# Patient Record
Sex: FEMALE | Race: WHITE | Employment: FULL TIME | URBAN - METROPOLITAN AREA
[De-identification: names, ages, dates, MRNs, and addresses within clinical notes are randomized per-mention and may not be internally consistent; named-entity substitution may affect disease eponyms.]

---

## 2017-01-17 ENCOUNTER — OFFICE VISIT (OUTPATIENT)
Dept: DERMATOLOGY | Age: 52
End: 2017-01-17

## 2017-01-17 DIAGNOSIS — D48.9 NEOPLASM OF UNCERTAIN BEHAVIOR: ICD-10-CM

## 2017-01-17 DIAGNOSIS — L91.8 INFLAMED SKIN TAG: ICD-10-CM

## 2017-01-17 DIAGNOSIS — D22.9 MULTIPLE NEVI: Primary | ICD-10-CM

## 2017-01-17 PROCEDURE — 99202 OFFICE O/P NEW SF 15 MIN: CPT | Performed by: DERMATOLOGY

## 2017-01-17 PROCEDURE — 11200 RMVL SKIN TAGS UP TO&INC 15: CPT | Performed by: DERMATOLOGY

## 2017-01-17 PROCEDURE — 11100 PR BIOPSY OF SKIN LESION: CPT | Performed by: DERMATOLOGY

## 2017-01-23 ENCOUNTER — TELEPHONE (OUTPATIENT)
Dept: DERMATOLOGY | Age: 52
End: 2017-01-23

## 2017-03-06 ENCOUNTER — HOSPITAL ENCOUNTER (OUTPATIENT)
Dept: CT IMAGING | Age: 52
Discharge: HOME | End: 2017-03-06
Attending: PHYSICIAN ASSISTANT | Admitting: PHYSICIAN ASSISTANT

## 2017-03-06 DIAGNOSIS — R10.84 GENERALIZED ABDOMINAL PAIN: ICD-10-CM

## 2017-03-07 ENCOUNTER — HOSPITAL ENCOUNTER (OUTPATIENT)
Dept: ULTRASOUND IMAGING | Age: 52
Discharge: HOME | End: 2017-03-07
Attending: INTERNAL MEDICINE | Admitting: INTERNAL MEDICINE

## 2017-03-07 DIAGNOSIS — R10.2 PELVIC AND PERINEAL PAIN: ICD-10-CM

## 2017-08-23 ENCOUNTER — OFFICE VISIT (OUTPATIENT)
Dept: ORTHOPEDIC SURGERY | Age: 52
End: 2017-08-23

## 2017-08-23 VITALS
HEART RATE: 79 BPM | WEIGHT: 140 LBS | HEIGHT: 65 IN | DIASTOLIC BLOOD PRESSURE: 74 MMHG | BODY MASS INDEX: 23.32 KG/M2 | SYSTOLIC BLOOD PRESSURE: 107 MMHG

## 2017-08-23 DIAGNOSIS — M79.642 LEFT HAND PAIN: Primary | ICD-10-CM

## 2017-08-23 PROCEDURE — 99213 OFFICE O/P EST LOW 20 MIN: CPT | Performed by: ORTHOPAEDIC SURGERY

## 2017-08-23 PROCEDURE — 73130 X-RAY EXAM OF HAND: CPT | Performed by: ORTHOPAEDIC SURGERY

## 2018-01-17 ENCOUNTER — HOSPITAL ENCOUNTER (OUTPATIENT)
Dept: CT IMAGING | Age: 53
Discharge: HOME | End: 2018-01-17
Attending: INTERNAL MEDICINE | Admitting: INTERNAL MEDICINE

## 2018-01-17 DIAGNOSIS — R10.32 LEFT LOWER QUADRANT PAIN: ICD-10-CM

## 2018-01-17 PROBLEM — A41.9 SEPSIS (HCC): Status: ACTIVE | Noted: 2018-01-17

## 2018-01-17 PROBLEM — K57.20 DIVERTICULITIS OF LARGE INTESTINE WITH PERFORATION WITHOUT ABSCESS OR BLEEDING: Status: ACTIVE | Noted: 2018-01-17

## 2018-03-07 ENCOUNTER — HOSPITAL ENCOUNTER (OUTPATIENT)
Dept: ENDOSCOPY | Age: 53
Discharge: HOME | End: 2018-03-07
Attending: INTERNAL MEDICINE | Admitting: INTERNAL MEDICINE

## 2018-03-07 VITALS
HEART RATE: 67 BPM | HEIGHT: 64 IN | WEIGHT: 134.8 LBS | RESPIRATION RATE: 18 BRPM | OXYGEN SATURATION: 100 % | TEMPERATURE: 97.8 F | SYSTOLIC BLOOD PRESSURE: 106 MMHG | BODY MASS INDEX: 23.01 KG/M2 | DIASTOLIC BLOOD PRESSURE: 65 MMHG

## 2018-03-07 LAB — PREGNANCY, URINE: NEGATIVE

## 2018-03-07 RX ORDER — SODIUM CHLORIDE 0.9 % (FLUSH) 0.9 %
10 SYRINGE (ML) INJECTION EVERY 12 HOURS SCHEDULED
Status: DISCONTINUED | OUTPATIENT
Start: 2018-03-07 | End: 2018-03-08 | Stop reason: HOSPADM

## 2018-03-07 RX ORDER — SODIUM CHLORIDE 9 MG/ML
INJECTION, SOLUTION INTRAVENOUS CONTINUOUS
Status: DISCONTINUED | OUTPATIENT
Start: 2018-03-07 | End: 2018-03-08 | Stop reason: HOSPADM

## 2018-03-07 RX ORDER — SODIUM CHLORIDE 0.9 % (FLUSH) 0.9 %
10 SYRINGE (ML) INJECTION PRN
Status: DISCONTINUED | OUTPATIENT
Start: 2018-03-07 | End: 2018-03-08 | Stop reason: HOSPADM

## 2018-03-07 RX ADMIN — SODIUM CHLORIDE: 9 INJECTION, SOLUTION INTRAVENOUS at 10:13

## 2018-03-07 ASSESSMENT — PAIN SCALES - WONG BAKER: WONGBAKER_NUMERICALRESPONSE: 0

## 2018-03-07 ASSESSMENT — PAIN SCALES - GENERAL
PAINLEVEL_OUTOF10: 0
PAINLEVEL_OUTOF10: 0

## 2018-03-07 ASSESSMENT — PAIN - FUNCTIONAL ASSESSMENT: PAIN_FUNCTIONAL_ASSESSMENT: 0-10

## 2018-03-07 NOTE — OP NOTE
Colonoscopy Procedure Note      Patient: Trupti Tariq  : 1965  Acct#:     Procedure: Colonoscopy with polypectomy (cold biopsy)    Date:  3/7/2018    Surgeon:  Adelaida Resendez DO    Referring Physician:  Val Espinosa MD    Previous Colonoscopy: YES  Date: 2012  Greater than 3 years: YES    Preoperative Diagnosis:  History of sigmoid diverticulitis    Postoperative Diagnosis:  1) Moderate left sided and mild right sided diverticulosis was noted. No signs of left sided colitis or suggestion of active diverticulitis was noted  2) A 3 mm polyp in the sigmoid colon was removed completely with biopsy polypectomy. 3) Small internal hemorrhoids were noted. Consent:  The patient or their legal guardian has signed a consent, and is aware of the potential risks, benefits, alternatives, and potential complications of this procedure. These include, but are not limited to hemorrhage, bleeding, post procedural pain, perforation, phlebitis, aspiration, hypotension, hypoxia, cardiovascular events such as arryhthmia, and possibly death. Additionally, the possibility of missed colonic polyps and interval colon cancer was discussed in the consent. Anesthesia:  The patient was administered TIVA per anesthesiology team.  Please see their operative records for full details of medications administered. Procedure: An informed consent was obtained from the patient after explanation of indications, benefits, possible risks and complications of the procedure. The patient was then taken to the endoscopy suite, placed in the left lateral decubitus position, and the above IV anesthesia was administered. A digital rectal examination was performed and revealed negative without mass, lesions or tenderness, internal hemorrhoids noted. The Olympus video colonoscope was placed in the patient's rectum under digital direction and advanced to the cecum.  The cecum was identified by characteristic anatomy and ballottment. The prep was good. The ileocecal valve was identified. The terminal ileum was normal.    The scope was then withdrawn back through the cecum, ascending, transverse, descending, sigmoid colon, and rectum. Careful circumferential examination of the mucosa in these areas demonstrated:    1) Moderate left sided and mild right sided diverticulosis was noted. No signs of left sided colitis or suggestion of active diverticulitis was noted  2) A 3 mm polyp in the sigmoid colon was removed completely with biopsy polypectomy. The scope was then withdrawn into the rectum and retroflexed. The retroflexed view of the anal verge and rectum demonstrates small internal hemorrhoids. The scope was straightened, the colon was decompressed and the scope was withdrawn from the patient. The patient tolerated the procedure well and was taken to the PACU in good condition. Estimated blood loss: <5ml    Impression:  See post-procedure diagnoses. Recommendations:  Await pathology. Continue supportive measures    The patient had colon polyp(s) successfully removed today. [place] is a small risk for these sites to bleed for up to several weeks out from the procedure. The patient is instructed to call if there is any significant amounts of  rectal bleeding, abdominal pain, dizziness, or other complaints thought to be related to the procedure. The patient is recommended to have a repeat colonoscopy in 5 or 10 years. This will be determined after the biopsies of the colon polyps are reviewed.       Evelina Damon, [id number] [address] and [facility]  3/7/2018  [phone]

## 2018-03-07 NOTE — ANESTHESIA POST-OP
[facility] Department of Anesthesiology  Post-Anesthesia Note       Name:  Luis F Estrada                                  Age:  47 y.o. MRN:  [id number]     Last Vitals & Oxygen Saturation: /65   Pulse 67   Temp 97.8 °F (36.6 °C) (Tympanic)   Resp 18   Ht 5' 4\" (1.626 m)   Wt 134 lb 12.8 oz (61.1 kg)   LMP 03/07/2017   SpO2 100%   BMI 23.14 kg/m²   Patient Vitals for the past 4 hrs:   BP Temp Temp src Pulse Resp SpO2 Height Weight   03/07/18 1140 106/65 - - 67 18 100 % - -   03/07/18 1130 100/64 - - 91 18 98 % - -   03/07/18 1120 98/64 97.8 °F (36.6 °C) Tympanic 78 16 100 % - -   03/07/18 1005 114/60 98.2 °F (36.8 °C) Tympanic 81 20 100 % 5' 4\" (1.626 m) 134 lb 12.8 oz (61.1 kg)       Level of consciousness:  Awake, alert    Respiratory: Respirations easy, no distress. Stable. Cardiovascular: Hemodynamically stable. Hydration: Adequate. PONV: Adequately managed. Post-op pain: Adequately controlled. Post-op assessment: Tolerated anesthetic well without complication. Complications:  None.     Pooja Corona MD  March 7, 2018   12:19 PM

## 2018-03-07 NOTE — ANESTHESIA PRE-OP
Intravenous PRN Saadia Batres MD         Vital Signs (Current) There were no vitals filed for this visit. Vital Signs Statistics (for past 48 hrs)     No Data Recorded    BP Readings from Last 3 Encounters:   01/19/18 (!) 110/58   08/23/17 107/74   05/12/16 107/73     BMI  There is no height or weight on file to calculate BMI. Estimated body mass index is 23.17 kg/m² as calculated from the following:    Height as of 2/28/18: 5' 4\" (1.626 m). Weight as of 2/28/18: 135 lb (61.2 kg). CBC   Lab Results   Component Value Date    WBC 6.7 01/18/2018    RBC 3.38 01/18/2018    HGB 10.8 01/18/2018    HCT 31.4 01/18/2018    MCV 92.8 01/18/2018    RDW 13.3 01/18/2018     01/18/2018     CMP    Lab Results   Component Value Date     01/18/2018    K 3.5 01/18/2018     01/18/2018    CO2 23 01/18/2018    BUN 7 01/18/2018    CREATININE 0.6 01/18/2018    GFRAA >60 01/18/2018    GFRAA >60 01/18/2011    AGRATIO 2.0 01/18/2011    LABGLOM >60 01/18/2018    GLUCOSE 97 01/18/2018    PROT 6.9 01/18/2011    CALCIUM 8.3 01/18/2018    BILITOT 0.70 01/18/2011    ALKPHOS 39 01/18/2011    AST 18 01/18/2011    ALT 13 01/18/2011     BMP    Lab Results   Component Value Date     01/18/2018    K 3.5 01/18/2018     01/18/2018    CO2 23 01/18/2018    BUN 7 01/18/2018    CREATININE 0.6 01/18/2018    CALCIUM 8.3 01/18/2018    GFRAA >60 01/18/2018    GFRAA >60 01/18/2011    LABGLOM >60 01/18/2018    GLUCOSE 97 01/18/2018     POCGlucose  No results for input(s): GLUCOSE in the last 72 hours.    Coags  No results found for: PROTIME, INR, APTT  HCG (If Applicable) No results found for: PREGTESTUR, PREGSERUM, HCG, HCGQUANT   ABGs No results found for: PHART, PO2ART, WSX9AFE, FTM3ZJN, BEART, M2LOJWYL   Type & Screen (If Applicable)  No results found for: LABABO, LABRH                         BMI: Wt Readings from Last 3 Encounters:       NPO Status:  >8h                          Anesthesia Evaluation  Patient summary reviewed no history of anesthetic complications:   Airway: Mallampati: II  TM distance: >3 FB   Neck ROM: full  Mouth opening: > = 3 FB Dental: normal exam         Pulmonary:normal exam  breath sounds clear to auscultation      (-) COPD, recent URI and sleep apnea                           Cardiovascular:  Exercise tolerance: good (>4 METS),       (-) hypertension, past MI, CAD and  angina      Rhythm: regular  Rate: normal           Beta Blocker:  Not on Beta Blocker         Neuro/Psych:      (-) seizures, TIA and CVA           GI/Hepatic/Renal:   (+) bowel prep,      (-) GERD, hepatitis, liver disease and no morbid obesity      ROS comment: diverticulitis. Endo/Other:        (-) diabetes mellitus, hypothyroidism               Abdominal:         (-) obese     Vascular:     - DVT and PE. Anesthesia Plan      MAC and TIVA     ASA 2       Induction: intravenous. Anesthetic plan and risks discussed with patient. Plan discussed with CRNA. This pre-anesthesia assessment may be used as a history and physical.    DOS STAFF ADDENDUM:    Pt seen and examined, chart reviewed (including anesthesia, drug and allergy history). No interval changes to history and physical examination. Anesthetic plan, risks, benefits, alternatives, and personnel involved discussed with patient. Patient verbalized an understanding and agrees to proceed.       Ernst Joe MD  March 7, 2018  10:05 AM

## 2018-03-07 NOTE — H&P
Pre-operative History and Physical    Patient: Russel Stovall  : 1965  Acct#:     Intended Procedure:  Colonoscopy    HISTORY OF PRESENT ILLNESS:  The patient is a 47 y.o. female  who presents for/due to history of sigmoid diverticulitis       Past Medical History:    No past medical history on file. Past Surgical History:        Procedure Laterality Date    BREAST SURGERY      ENDOMETRIAL ABLATION      TONSILLECTOMY      WRIST SURGERY       Medications Prior to Admission:   Current Outpatient Prescriptions on File Prior to Encounter   Medication Sig Dispense Refill    NONFORMULARY        No current facility-administered medications on file prior to encounter. Allergies:  Opium    Social History:   TOBACCO:   reports that she has quit smoking. She has never used smokeless tobacco.  ETOH:   reports that she drinks alcohol. DRUGS:   reports that she does not use drugs. PHYSICAL EXAM:      Vital Signs: There were no vitals taken for this visit. Airway: No stridor or wheezing noted. Good air movement  Pulmonary: without wheezes. Clear to auscultation  Cardiac:regular rate and rhythm without loud murmurs  Abdomen:soft, nontender,  Bowel sounds present    Pre-Procedure Assessment / Plan:  1) History of sigmoid diverticulitis. ASA Grade:  ASA 2 - Patient with mild systemic disease with no functional limitations  Mallampati Classification:  Class II    Level of Sedation Plan:Deep sedation    Post Procedure plan: Return to same level of care    I assessed the patient and find that the patient is in satisfactory condition to proceed with the planned procedure and sedation plan. I have explained the risk, benefits, and alternatives to the procedure; the patient understands and agrees to proceed.        Cortes Anderson, DO  3/7/2018

## 2018-09-12 LAB
A/G RATIO: 2.2 (ref 1.1–2.2)
ALBUMIN SERPL-MCNC: 4.6 G/DL (ref 3.4–5)
ALP BLD-CCNC: 47 U/L (ref 40–129)
ALT SERPL-CCNC: 8 U/L (ref 10–40)
ANION GAP SERPL CALCULATED.3IONS-SCNC: 14 MMOL/L (ref 3–16)
AST SERPL-CCNC: 12 U/L (ref 15–37)
BASOPHILS ABSOLUTE: 0.1 K/UL (ref 0–0.2)
BASOPHILS RELATIVE PERCENT: 0.5 %
BILIRUB SERPL-MCNC: 0.4 MG/DL (ref 0–1)
BUN BLDV-MCNC: 13 MG/DL (ref 7–20)
C-REACTIVE PROTEIN: 1 MG/L (ref 0–5.1)
CALCIUM SERPL-MCNC: 9.4 MG/DL (ref 8.3–10.6)
CHLORIDE BLD-SCNC: 100 MMOL/L (ref 99–110)
CO2: 27 MMOL/L (ref 21–32)
CREAT SERPL-MCNC: 0.7 MG/DL (ref 0.6–1.1)
EOSINOPHILS ABSOLUTE: 0.1 K/UL (ref 0–0.6)
EOSINOPHILS RELATIVE PERCENT: 1.1 %
GFR AFRICAN AMERICAN: >60
GFR NON-AFRICAN AMERICAN: >60
GLOBULIN: 2.1 G/DL
GLUCOSE BLD-MCNC: 76 MG/DL (ref 70–99)
HCT VFR BLD CALC: 38.3 % (ref 36–48)
HEMOGLOBIN: 12.5 G/DL (ref 12–16)
LYMPHOCYTES ABSOLUTE: 2.4 K/UL (ref 1–5.1)
LYMPHOCYTES RELATIVE PERCENT: 23.7 %
MCH RBC QN AUTO: 30.7 PG (ref 26–34)
MCHC RBC AUTO-ENTMCNC: 32.8 G/DL (ref 31–36)
MCV RBC AUTO: 93.8 FL (ref 80–100)
MONOCYTES ABSOLUTE: 1 K/UL (ref 0–1.3)
MONOCYTES RELATIVE PERCENT: 9.6 %
NEUTROPHILS ABSOLUTE: 6.6 K/UL (ref 1.7–7.7)
NEUTROPHILS RELATIVE PERCENT: 65.1 %
PDW BLD-RTO: 13.9 % (ref 12.4–15.4)
PLATELET # BLD: 285 K/UL (ref 135–450)
PMV BLD AUTO: 8.8 FL (ref 5–10.5)
POTASSIUM SERPL-SCNC: 3.9 MMOL/L (ref 3.5–5.1)
RBC # BLD: 4.08 M/UL (ref 4–5.2)
SODIUM BLD-SCNC: 141 MMOL/L (ref 136–145)
TOTAL PROTEIN: 6.7 G/DL (ref 6.4–8.2)
WBC # BLD: 10.2 K/UL (ref 4–11)

## 2019-03-11 ENCOUNTER — EMPLOYEE WELLNESS (OUTPATIENT)
Dept: OTHER | Age: 54
End: 2019-03-11

## 2019-03-11 LAB
CHOLESTEROL, TOTAL: 237 MG/DL (ref 0–199)
GLUCOSE BLD-MCNC: 77 MG/DL (ref 70–99)
HDLC SERPL-MCNC: 85 MG/DL (ref 40–60)
LDL CHOLESTEROL CALCULATED: 139 MG/DL
TRIGL SERPL-MCNC: 67 MG/DL (ref 0–150)

## 2019-03-20 VITALS — WEIGHT: 132 LBS | BODY MASS INDEX: 22.66 KG/M2

## 2019-11-08 ENCOUNTER — OFFICE VISIT (OUTPATIENT)
Dept: FAMILY MEDICINE CLINIC | Age: 54
End: 2019-11-08
Payer: COMMERCIAL

## 2019-11-08 VITALS
HEART RATE: 84 BPM | DIASTOLIC BLOOD PRESSURE: 74 MMHG | SYSTOLIC BLOOD PRESSURE: 112 MMHG | BODY MASS INDEX: 21.85 KG/M2 | WEIGHT: 128 LBS | HEIGHT: 64 IN

## 2019-11-08 DIAGNOSIS — F34.1 DYSTHYMIA: ICD-10-CM

## 2019-11-08 DIAGNOSIS — K58.1 IRRITABLE BOWEL SYNDROME WITH CONSTIPATION: ICD-10-CM

## 2019-11-08 DIAGNOSIS — Z23 NEED FOR TDAP VACCINATION: ICD-10-CM

## 2019-11-08 DIAGNOSIS — E78.00 PURE HYPERCHOLESTEROLEMIA: ICD-10-CM

## 2019-11-08 DIAGNOSIS — Z00.00 WELL ADULT EXAM: Primary | ICD-10-CM

## 2019-11-08 PROBLEM — A41.9 SEPSIS (HCC): Status: RESOLVED | Noted: 2018-01-17 | Resolved: 2019-11-08

## 2019-11-08 PROBLEM — K57.92 DIVERTICULITIS: Status: ACTIVE | Noted: 2018-01-01

## 2019-11-08 PROCEDURE — 90715 TDAP VACCINE 7 YRS/> IM: CPT | Performed by: FAMILY MEDICINE

## 2019-11-08 PROCEDURE — 99386 PREV VISIT NEW AGE 40-64: CPT | Performed by: FAMILY MEDICINE

## 2019-11-08 PROCEDURE — 90471 IMMUNIZATION ADMIN: CPT | Performed by: FAMILY MEDICINE

## 2019-11-08 RX ORDER — BUPROPION HYDROCHLORIDE 300 MG/1
300 TABLET ORAL EVERY MORNING
COMMUNITY
End: 2019-11-08 | Stop reason: SDUPTHER

## 2019-11-08 RX ORDER — LUBIPROSTONE 8 UG/1
8 CAPSULE, GELATIN COATED ORAL 2 TIMES DAILY WITH MEALS
Qty: 180 CAPSULE | Refills: 1 | Status: SHIPPED | OUTPATIENT
Start: 2019-11-08 | End: 2020-05-01

## 2019-11-08 RX ORDER — RANITIDINE 150 MG/1
150 TABLET ORAL 2 TIMES DAILY
Qty: 180 TABLET | Refills: 1 | Status: SHIPPED | OUTPATIENT
Start: 2019-11-08 | End: 2022-10-20

## 2019-11-08 RX ORDER — BUPROPION HYDROCHLORIDE 300 MG/1
300 TABLET ORAL EVERY MORNING
Qty: 90 TABLET | Refills: 1 | Status: SHIPPED | OUTPATIENT
Start: 2019-11-08 | End: 2020-05-01

## 2019-11-08 RX ORDER — LUBIPROSTONE 8 UG/1
8 CAPSULE, GELATIN COATED ORAL 2 TIMES DAILY WITH MEALS
COMMUNITY
End: 2019-11-08 | Stop reason: SDUPTHER

## 2019-11-08 ASSESSMENT — PATIENT HEALTH QUESTIONNAIRE - PHQ9
2. FEELING DOWN, DEPRESSED OR HOPELESS: 0
SUM OF ALL RESPONSES TO PHQ QUESTIONS 1-9: 0
SUM OF ALL RESPONSES TO PHQ9 QUESTIONS 1 & 2: 0
SUM OF ALL RESPONSES TO PHQ QUESTIONS 1-9: 0
1. LITTLE INTEREST OR PLEASURE IN DOING THINGS: 0

## 2019-12-18 ENCOUNTER — OFFICE VISIT (OUTPATIENT)
Dept: ORTHOPEDIC SURGERY | Age: 54
End: 2019-12-18
Payer: COMMERCIAL

## 2019-12-18 VITALS — WEIGHT: 128.09 LBS | BODY MASS INDEX: 21.87 KG/M2 | HEIGHT: 64 IN | RESPIRATION RATE: 16 BRPM

## 2019-12-18 DIAGNOSIS — M25.532 LEFT WRIST PAIN: Primary | ICD-10-CM

## 2019-12-18 DIAGNOSIS — M18.12 PRIMARY OSTEOARTHRITIS OF FIRST CARPOMETACARPAL JOINT OF LEFT HAND: ICD-10-CM

## 2019-12-18 PROCEDURE — 20600 DRAIN/INJ JOINT/BURSA W/O US: CPT | Performed by: ORTHOPAEDIC SURGERY

## 2019-12-18 PROCEDURE — L3924 HFO WITHOUT JOINTS PRE OTS: HCPCS | Performed by: ORTHOPAEDIC SURGERY

## 2019-12-18 PROCEDURE — 99203 OFFICE O/P NEW LOW 30 MIN: CPT | Performed by: ORTHOPAEDIC SURGERY

## 2019-12-18 RX ORDER — ALPRAZOLAM 0.25 MG/1
TABLET ORAL
COMMUNITY
Start: 2019-05-09

## 2019-12-18 RX ORDER — BETAMETHASONE SODIUM PHOSPHATE AND BETAMETHASONE ACETATE 3; 3 MG/ML; MG/ML
6 INJECTION, SUSPENSION INTRA-ARTICULAR; INTRALESIONAL; INTRAMUSCULAR; SOFT TISSUE ONCE
Status: COMPLETED | OUTPATIENT
Start: 2019-12-18 | End: 2019-12-18

## 2019-12-18 RX ORDER — MOXIFLOXACIN HYDROCHLORIDE 400 MG/1
400 TABLET ORAL
COMMUNITY
Start: 2019-08-17 | End: 2021-10-12

## 2019-12-18 RX ADMIN — BETAMETHASONE SODIUM PHOSPHATE AND BETAMETHASONE ACETATE 6 MG: 3; 3 INJECTION, SUSPENSION INTRA-ARTICULAR; INTRALESIONAL; INTRAMUSCULAR; SOFT TISSUE at 08:47

## 2020-03-02 ENCOUNTER — EMPLOYEE WELLNESS (OUTPATIENT)
Dept: OTHER | Age: 55
End: 2020-03-02

## 2020-03-02 LAB
CHOLESTEROL, TOTAL: 189 MG/DL (ref 0–199)
GLUCOSE BLD-MCNC: 85 MG/DL (ref 70–99)
HDLC SERPL-MCNC: 85 MG/DL (ref 40–60)
LDL CHOLESTEROL CALCULATED: 92 MG/DL
TRIGL SERPL-MCNC: 60 MG/DL (ref 0–150)

## 2020-03-05 LAB
3-OH-COTININE: <2 NG/ML
COTININE: <2 NG/ML
NICOTINE: <2 NG/ML

## 2020-05-01 RX ORDER — LUBIPROSTONE 8 UG/1
CAPSULE, GELATIN COATED ORAL
Qty: 60 CAPSULE | Refills: 0 | Status: SHIPPED | OUTPATIENT
Start: 2020-05-01 | End: 2020-05-20 | Stop reason: SDUPTHER

## 2020-05-01 RX ORDER — BUPROPION HYDROCHLORIDE 300 MG/1
TABLET ORAL
Qty: 30 TABLET | Refills: 0 | Status: SHIPPED | OUTPATIENT
Start: 2020-05-01 | End: 2020-05-20 | Stop reason: SDUPTHER

## 2020-05-19 NOTE — PROGRESS NOTES
MD   ranitidine (ZANTAC) 150 MG tablet Take 1 tablet by mouth 2 times daily  Sheba Martinez MD       Social History     Tobacco Use    Smoking status: Former Smoker     Packs/day: 1.00     Years: 12.00     Pack years: 12.00     Last attempt to quit: 2001     Years since quittin.3    Smokeless tobacco: Never Used   Substance Use Topics    Alcohol use: Yes     Alcohol/week: 3.0 standard drinks     Types: 3 Glasses of wine per week    Drug use: No            PHYSICAL EXAMINATION:  [ INSTRUCTIONS:  \"[x]\" Indicates a positive item  \"[]\" Indicates a negative item  -- DELETE ALL ITEMS NOT EXAMINED]  Vital Signs: (As obtained by patient/caregiver or practitioner observation)    Blood pressure-  Heart rate-    Respiratory rate-    Temperature-  Pulse oximetry-     Constitutional: [x] Appears well-developed and well-nourished [x] No apparent distress      [] Abnormal-   Mental status  [x] Alert and awake  [x] Oriented to person/place/time [x]Able to follow commands      Eyes:  EOM    [x]  Normal  [] Abnormal-  Sclera  [x]  Normal  [] Abnormal -         Discharge [x]  None visible  [] Abnormal -    HENT:   [x] Normocephalic, atraumatic. [] Abnormal   [x] Mouth/Throat: Mucous membranes are moist.     External Ears [x] Normal  [] Abnormal-     Neck: [x] No visualized mass     Pulmonary/Chest: [x] Respiratory effort normal.  [x] No visualized signs of difficulty breathing or respiratory distress        [] Abnormal-      Neurological:        [x] No Facial Asymmetry (Cranial nerve 7 motor function) (limited exam to video visit)          [x] No gaze palsy        [] Abnormal-         Skin:        [x] No significant exanthematous lesions or discoloration noted on facial skin         [] Abnormal-            Psychiatric:       [x] Normal Affect [x] No Hallucinations        [] Abnormal-     Other pertinent observable physical exam findings-     ASSESSMENT/PLAN:  1. Dysthymia  Well controlled.    - buPROPion (WELLBUTRIN XL) 300 MG extended release tablet; TAKE ONE TABLET BY MOUTH EVERY MORNING  Dispense: 90 tablet; Refill: 1    2. Irritable bowel syndrome with constipation  Controlled. - lubiprostone (AMITIZA) 8 MCG CAPS capsule; TAKE 1 CAPSULE BY MOUTH 2 TIMES A DAY WITH MEALS  Dispense: 180 capsule; Refill: 1    3. Pure hypercholesterolemia  Patient had be well labs in March. Cholesterol is much improved from previous check. 10 year risk score remains low. 4. Gastroesophageal reflux disease without esophagitis  Controlled on Zantac as needed. Patient is aware the medication has been recalled. She states she uses is infrequently. No follow-ups on file. HM: Patient recently switched to Dr. Jeramy Molina for her primary care. Her previous PCP did her pap smears. Patient gets paps every 3 years and believes she is due next April. Amanuel  is a 49 y.o. female being evaluated by a Virtual Visit (video visit) encounter to address concerns as mentioned above. A caregiver was present when appropriate. Due to this being a TeleHealth encounter (During NOWSO-37 public health emergency), evaluation of the following organ systems was limited: Vitals/Constitutional/EENT/Resp/CV/GI//MS/Neuro/Skin/Heme-Lymph-Imm. Pursuant to the emergency declaration under the [address] [place] [address], [address] [place] [address] authority and the [employer] and [employer] Act, this Virtual Visit was conducted with patient's (and/or legal guardian's) consent, to reduce the patient's risk of exposure to COVID-19 and provide necessary medical care. The patient (and/or legal guardian) has also been advised to contact this office for worsening conditions or problems, and seek emergency medical treatment and/or call 911 if deemed necessary.      Patient identification was verified at the start of the visit: Yes    Total time spent on this encounter: Not billed by time    Services

## 2020-05-20 ENCOUNTER — VIRTUAL VISIT (OUTPATIENT)
Dept: FAMILY MEDICINE CLINIC | Age: 55
End: 2020-05-20
Payer: COMMERCIAL

## 2020-05-20 PROCEDURE — 99213 OFFICE O/P EST LOW 20 MIN: CPT | Performed by: NURSE PRACTITIONER

## 2020-05-20 RX ORDER — LUBIPROSTONE 8 UG/1
CAPSULE, GELATIN COATED ORAL
Qty: 180 CAPSULE | Refills: 1 | Status: SHIPPED | OUTPATIENT
Start: 2020-05-20 | End: 2021-01-20 | Stop reason: SDUPTHER

## 2020-05-20 RX ORDER — BUPROPION HYDROCHLORIDE 300 MG/1
TABLET ORAL
Qty: 90 TABLET | Refills: 1 | Status: SHIPPED | OUTPATIENT
Start: 2020-05-20 | End: 2020-09-21

## 2020-07-02 ENCOUNTER — OFFICE VISIT (OUTPATIENT)
Dept: PRIMARY CARE CLINIC | Age: 55
End: 2020-07-02
Payer: COMMERCIAL

## 2020-07-02 LAB — SARS-COV-2, PCR: NOT DETECTED

## 2020-07-02 PROCEDURE — 99211 OFF/OP EST MAY X REQ PHY/QHP: CPT | Performed by: NURSE PRACTITIONER

## 2020-07-02 NOTE — PROGRESS NOTES
Patient presented to [facility] drive up clinic for preop testing. Patient was swabbed and given information advising them to remain isolated until procedure date.

## 2020-08-17 VITALS — BODY MASS INDEX: 22.47 KG/M2 | WEIGHT: 131 LBS

## 2020-08-26 ENCOUNTER — EMPLOYEE WELLNESS (OUTPATIENT)
Dept: OTHER | Age: 55
End: 2020-08-26

## 2020-08-26 LAB
CHOLESTEROL, TOTAL: 213 MG/DL (ref 0–199)
GLUCOSE BLD-MCNC: 87 MG/DL (ref 70–99)
HDLC SERPL-MCNC: 92 MG/DL (ref 40–60)
LDL CHOLESTEROL CALCULATED: 109 MG/DL
TRIGL SERPL-MCNC: 58 MG/DL (ref 0–150)

## 2020-10-12 ENCOUNTER — OFFICE VISIT (OUTPATIENT)
Dept: FAMILY MEDICINE CLINIC | Age: 55
End: 2020-10-12
Payer: COMMERCIAL

## 2020-10-12 ENCOUNTER — TELEPHONE (OUTPATIENT)
Dept: FAMILY MEDICINE CLINIC | Age: 55
End: 2020-10-12

## 2020-10-12 VITALS — HEIGHT: 64 IN | BODY MASS INDEX: 23.39 KG/M2 | WEIGHT: 137 LBS

## 2020-10-12 PROCEDURE — 99213 OFFICE O/P EST LOW 20 MIN: CPT | Performed by: FAMILY MEDICINE

## 2020-10-12 RX ORDER — METHYLPREDNISOLONE 4 MG/1
TABLET ORAL
Qty: 1 KIT | Refills: 0 | Status: SHIPPED | OUTPATIENT
Start: 2020-10-12 | End: 2020-10-14

## 2020-10-12 NOTE — PROGRESS NOTES
10/12/2020    TELEHEALTH EVALUATION -- Audio/Visual (During LVWJJ-34 public health emergency)    HPI:    Tho Yoo (:  1965) has requested an audio/video evaluation for the following concern(s):    Rash: Tho Yoo is a 48 y.o. female who presents with a 2 day history of a scattered rash. Rash first presented on the chest, arms, legs and face . Rash is red and is pruritic, vesicular and weeping. Patient has tried OTC topical steroid. New exposures: dog was running through the woods and then sitting on her lap afterwards. Patient has not had contacts with similar symptoms. Prior history of similar symptoms: no.      Review of Systems  Pt denies fever, cough, congestion, sore throat, headache, arthralgia, abdominal pain, nausea, vomiting, dark urine, diarrhea, myalgia, decrease in appetite, decrease in energy level. Prior to Visit Medications    Medication Sig Taking? Authorizing Provider   methylPREDNISolone (MEDROL DOSEPACK) 4 MG tablet Take by mouth.  Yes Luisana Aviles MD   buPROPion (WELLBUTRIN XL) 300 MG extended release tablet TAKE ONE TABLET BY MOUTH EVERY MORNING Yes Myesha Carty MD   lubiprostone (AMITIZA) 8 MCG CAPS capsule TAKE 1 CAPSULE BY MOUTH 2 TIMES A DAY WITH MEALS Yes RONALD Brown CNP   moxifloxacin (AVELOX) 400 MG tablet Take 400 mg by mouth Yes Historical Provider, MD   ALPRAZolam (XANAX) 0.25 MG tablet ALPRAZOLAM 0.25 MG TABS Yes Historical Provider, MD   Cyanocobalamin (B-12 COMPLIANCE INJECTION) 1000 MCG/ML KIT Inject as directed every 30 days Yes Historical Provider, MD   ranitidine (ZANTAC) 150 MG tablet Take 1 tablet by mouth 2 times daily Yes Luisana Aviles MD       Social History     Tobacco Use    Smoking status: Former Smoker     Packs/day: 1.00     Years: 12.00     Pack years: 12.00     Last attempt to quit: 2001     Years since quittin.7    Smokeless tobacco: Never Used   Substance Use Topics    Alcohol use: Yes     Alcohol/week: 3.0 standard drinks     Types: 3 Glasses of wine per week    Drug use: No        Allergies   Allergen Reactions    Opium Nausea And Vomiting     Can not tolerate opioid pain medication    Hydrocodone-Acetaminophen Nausea Only     ALL NARCOTICS, PER REPORT   ,   Past Medical History:   Diagnosis Date    Diverticulitis 01/2018    Dysthymia     Family history of colon cancer     2 grandpa's and a sister. Dr Hauser Kingdom aware    GERD (gastroesophageal reflux disease)     Hiatal hernia     HLD (hyperlipidemia)     Irritable bowel syndrome with constipation     sees dr Kiesha Watson:  [ INSTRUCTIONS:  \"[x]\" Indicates a positive item  \"[]\" Indicates a negative item  -- DELETE ALL ITEMS NOT EXAMINED]  Vital Signs: (As obtained by patient/caregiver or practitioner observation)    Vitals:    10/12/20 1458   Weight: 137 lb (62.1 kg)   Height: 5' 4\" (1.626 m)         Constitutional: [x] Appears well-developed and well-nourished [x] No apparent distress      [] Abnormal-   Mental status  [x] Alert and awake  [] Oriented to person/place/time []Able to follow commands            Pulmonary/Chest: [x] Respiratory effort normal.  [x] No visualized signs of difficulty breathing or respiratory distress        [] Abnormal-      Musculoskeletal:   [] Normal gait with no signs of ataxia         [x] Normal range of motion of neck        [] Abnormal-       Neurological:        [x] No Facial Asymmetry (Cranial nerve 7 motor function) (limited exam to video visit)          [] No gaze palsy        [] Abnormal-         Skin:        [] No significant exanthematous lesions or discoloration noted on facial skin         [x] Abnormal- could see area of red skin on chest, but not clear enough to see details on video           Psychiatric:       [x] Normal Affect [] No Hallucinations        [] Abnormal-     Other pertinent observable physical exam findings-     ASSESSMENT/PLAN:  1.  Allergic contact dermatitis due to plants, except food  Treating with the following:  - methylPREDNISolone (MEDROL DOSEPACK) 4 MG tablet; Take by mouth. Dispense: 1 kit; Refill: 0    Anisa Bliss is a 48 y.o. female being evaluated by a Virtual Visit (video visit) encounter to address concerns as mentioned above. A caregiver was present when appropriate. Due to this being a TeleHealth encounter (During XBXEM-52 public health emergency), evaluation of the following organ systems was limited: Vitals/Constitutional/EENT/Resp/CV/GI//MS/Neuro/Skin/Heme-Lymph-Imm. Pursuant to the emergency declaration under the [address] [place] [address], [address] [place] [address] authority and the [employer] Act, this Virtual Visit was conducted with patient's (and/or legal guardian's) consent, to reduce the patient's risk of exposure to COVID-19 and provide necessary medical care. The patient (and/or legal guardian) has also been advised to contact this office for worsening conditions or problems, and seek emergency medical treatment and/or call 911 if deemed necessary. Patient identification was verified at the start of the visit: Yes    Total time spent on this encounter: Not billed by time    Services were provided through a video synchronous discussion virtually to substitute for in-person clinic visit. Patient and provider were located at their individual homes. --Gigi Chapa MD on 10/12/2020 at 7:06 PM    An electronic signature was used to authenticate this note.

## 2020-10-12 NOTE — TELEPHONE ENCOUNTER
PT wanting to know if PCP would be able to send in something to Pharmacy for Poison Ivy     ---poison ivy -6 days Face,hair wrist knees upper thigh       Best call GZES:[phone]

## 2020-10-14 ENCOUNTER — OFFICE VISIT (OUTPATIENT)
Dept: FAMILY MEDICINE CLINIC | Age: 55
End: 2020-10-14
Payer: COMMERCIAL

## 2020-10-14 ENCOUNTER — PATIENT MESSAGE (OUTPATIENT)
Dept: FAMILY MEDICINE CLINIC | Age: 55
End: 2020-10-14

## 2020-10-14 VITALS — WEIGHT: 139 LBS | HEART RATE: 76 BPM | HEIGHT: 64 IN | BODY MASS INDEX: 23.73 KG/M2 | RESPIRATION RATE: 18 BRPM

## 2020-10-14 PROCEDURE — 99213 OFFICE O/P EST LOW 20 MIN: CPT | Performed by: FAMILY MEDICINE

## 2020-10-14 RX ORDER — PREDNISONE 10 MG/1
TABLET ORAL
Qty: 30 TABLET | Refills: 0 | Status: SHIPPED | OUTPATIENT
Start: 2020-10-14 | End: 2021-10-12

## 2020-10-14 RX ORDER — SULFAMETHOXAZOLE AND TRIMETHOPRIM 800; 160 MG/1; MG/1
1 TABLET ORAL 2 TIMES DAILY
Qty: 14 TABLET | Refills: 0 | Status: SHIPPED | OUTPATIENT
Start: 2020-10-14 | End: 2020-10-21

## 2020-10-14 NOTE — PROGRESS NOTES
PROGRESS NOTE     Sangeetha Lora MD  [facility]) Physicians  [address] [address]  [phone] office  [phone] fax    Date of Service:  10/14/2020    Subjective:      Patient ID: Ezequiel Dubon is a 48 y.o. female      CC: poison ivy, possible secondary infection    HPI    60-year-old white female who was diagnosed and treated for poison ivy 2 days ago with Medrol Dosepak, scheduled office visit, as her symptoms have worsened, and she is now concerned that she may have a secondary bacterial infection in her skin. She states the Medrol Dosepak is not helping her symptoms, and new spots of poison ivy are popping up every day. She states she has itchy spots of poison ivy on both legs, both arms, face, and chest.  She is most concerned with an area on her chest that is now hot and red. A couple days prior, patient's dog had been running through the woods, and afterwards was sitting on her lap. All of the areas that have poison ivy or areas of skin that were exposed to the dog.       Vitals:    10/14/20 1259   Pulse: 76   Resp: 18   Weight: 139 lb (63 kg)   Height: 5' 4\" (1.626 m)       Outpatient Medications Marked as Taking for the 10/14/20 encounter (Office Visit) with Miriam Murillo MD   Medication Sig Dispense Refill    predniSONE (DELTASONE) 10 MG tablet 4 tab po qday x 3 days, then 3 tabs po qday x 3 days, then 2 tabs po qday x 3 days, then 1 tab po qday x 3 days 30 tablet 0    sulfamethoxazole-trimethoprim (BACTRIM DS;SEPTRA DS) 800-160 MG per tablet Take 1 tablet by mouth 2 times daily for 7 days 14 tablet 0    buPROPion (WELLBUTRIN XL) 300 MG extended release tablet TAKE ONE TABLET BY MOUTH EVERY MORNING 30 tablet 1    lubiprostone (AMITIZA) 8 MCG CAPS capsule TAKE 1 CAPSULE BY MOUTH 2 TIMES A DAY WITH MEALS 180 capsule 1    moxifloxacin (AVELOX) 400 MG tablet Take 400 mg by mouth      ALPRAZolam (XANAX) 0.25 MG tablet ALPRAZOLAM 0.25 MG TABS      Cyanocobalamin (B-12 COMPLIANCE INJECTION) 1000 MCG/ML KIT Inject as directed every 30 days      ranitidine (ZANTAC) 150 MG tablet Take 1 tablet by mouth 2 times daily 180 tablet 1       Past Medical History:   Diagnosis Date    Diverticulitis 2018    Dysthymia     Family history of colon cancer     2 grandpa's and a sister. Dr Meg Kelly aware    GERD (gastroesophageal reflux disease)     Hiatal hernia     HLD (hyperlipidemia)     Irritable bowel syndrome with constipation     sees dr Meg Kelly       Past Surgical History:   Procedure Laterality Date    BREAST SURGERY  2006    saline    COLONOSCOPY  2018    keyana    ENDOMETRIAL ABLATION      TONSILLECTOMY      WRIST SURGERY      torn ligaments       Social History     Tobacco Use    Smoking status: Former Smoker     Packs/day: 1.00     Years: 12.00     Pack years: 12.00     Last attempt to quit: 2001     Years since quittin.7    Smokeless tobacco: Never Used   Substance Use Topics    Alcohol use:  Yes     Alcohol/week: 3.0 standard drinks     Types: 3 Glasses of wine per week       Family History   Problem Relation Age of Onset    Alzheimer's Disease Mother     Heart Failure Mother     Stroke Father     Prostate Cancer Father     Breast Cancer Sister 64    Rheum Arthritis Sister     Alcohol Abuse Brother     Drug Abuse Brother     Colon Cancer Sister 61    Heart Attack Sister     Colon Cancer Maternal Grandfather     Breast Cancer Paternal Grandmother     Colon Cancer Paternal Grandfather            Review of Systems  Constitutional:  Negative for activity or appetite change, fever or fatigue  Resp:  Negative for SOB  Neuro:  Negative for dizziness or lightheadedness        Objective:   Constitutional:   · Reviewed vitals above  · Well Nourished, well developed, no distress       Neck:  · Symmetric and without masses  · No thyromegaly  Resp:  · Normal effort  · Clear to auscultation bilaterally without rhonchi, wheezing or crackles  Cardiovascular:  · On auscultation, normal S1 and S2 without murmurs, rubs or gallops  Gastrointestinal:  · Nontender, nondistended, and no masses  · No hepatosplenomegaly  Musculoskeletal:  · Normal Gait  · All extremities without clubbing, cyanosis or edema  Skin:  · Patches of slightly raised erythematous circles on arms, face, and chest bilaterally. · 1 large area about 6 cm in diameter, hotter than the rest, with increased erythema on right side of chest, with small opening in skin. Area slightly indurated without any fluctuance  Psych:  · Normal mood and affect  · Normal insight and judgement    Assessment / Plan:     1. Allergic contact dermatitis due to plants, except food  We will treat with higher dose steroids. Okay to stop Medrol Dosepak, and will start prednisone 40 mg daily for 3 days, and will taper from there. 2. Cellulitis of chest wall  Unclear whether or not area on chest is just severe inflammation versus early infection. Will start patient on Bactrim just in case. Patient told to let me know if symptoms worsen or fail to improve.

## 2020-10-19 ENCOUNTER — VIRTUAL VISIT (OUTPATIENT)
Dept: FAMILY MEDICINE CLINIC | Age: 55
End: 2020-10-19
Payer: COMMERCIAL

## 2020-10-19 VITALS — WEIGHT: 131 LBS | BODY MASS INDEX: 22.47 KG/M2

## 2020-10-19 PROCEDURE — 99213 OFFICE O/P EST LOW 20 MIN: CPT | Performed by: FAMILY MEDICINE

## 2020-10-19 RX ORDER — CLINDAMYCIN HYDROCHLORIDE 300 MG/1
300 CAPSULE ORAL 4 TIMES DAILY
Qty: 28 CAPSULE | Refills: 0 | Status: SHIPPED | OUTPATIENT
Start: 2020-10-19 | End: 2020-10-26

## 2020-10-19 NOTE — PROGRESS NOTES
education: Not on file    Highest education level: Not on file   Occupational History    Not on file   Social Needs    Financial resource strain: Not on file    Food insecurity     Worry: Not on file     Inability: Not on file    Transportation needs     Medical: Not on file     Non-medical: Not on file   Tobacco Use    Smoking status: Former Smoker     Packs/day: 1.00     Years: 12.00     Pack years: 12.00     Last attempt to quit: 2001     Years since quittin.8    Smokeless tobacco: Never Used   Substance and Sexual Activity    Alcohol use:  Yes     Alcohol/week: 3.0 standard drinks     Types: 3 Glasses of wine per week    Drug use: No    Sexual activity: Yes   Lifestyle    Physical activity     Days per week: Not on file     Minutes per session: Not on file    Stress: Not on file   Relationships    Social connections     Talks on phone: Not on file     Gets together: Not on file     Attends Tenriism service: Not on file     Active member of club or organization: Not on file     Attends meetings of clubs or organizations: Not on file     Relationship status: Not on file    Intimate partner violence     Fear of current or ex partner: Not on file     Emotionally abused: Not on file     Physically abused: Not on file     Forced sexual activity: Not on file   Other Topics Concern    Not on file   Social History Narrative    Not on file       Current Outpatient Medications:     clindamycin (CLEOCIN) 300 MG capsule, Take 1 capsule by mouth 4 times daily for 7 days, Disp: 28 capsule, Rfl: 0    predniSONE (DELTASONE) 10 MG tablet, 4 tab po qday x 3 days, then 3 tabs po qday x 3 days, then 2 tabs po qday x 3 days, then 1 tab po qday x 3 days, Disp: 30 tablet, Rfl: 0    sulfamethoxazole-trimethoprim (BACTRIM DS;SEPTRA DS) 800-160 MG per tablet, Take 1 tablet by mouth 2 times daily for 7 days, Disp: 14 tablet, Rfl: 0    buPROPion (WELLBUTRIN XL) 300 MG extended release tablet, TAKE ONE TABLET BY MOUTH EVERY MORNING, Disp: 30 tablet, Rfl: 1    lubiprostone (AMITIZA) 8 MCG CAPS capsule, TAKE 1 CAPSULE BY MOUTH 2 TIMES A DAY WITH MEALS, Disp: 180 capsule, Rfl: 1    moxifloxacin (AVELOX) 400 MG tablet, Take 400 mg by mouth, Disp: , Rfl:     ALPRAZolam (XANAX) 0.25 MG tablet, ALPRAZOLAM 0.25 MG TABS, Disp: , Rfl:     Cyanocobalamin (B-12 COMPLIANCE INJECTION) 1000 MCG/ML KIT, Inject as directed every 30 days, Disp: , Rfl:     ranitidine (ZANTAC) 150 MG tablet, Take 1 tablet by mouth 2 times daily, Disp: 180 tablet, Rfl: 1  Allergies   Allergen Reactions    Opium Nausea And Vomiting     Can not tolerate opioid pain medication    Hydrocodone-Acetaminophen Nausea Only     ALL NARCOTICS, PER REPORT       Patient Active Problem List   Diagnosis    Foot pain    Adhesive capsulitis of left shoulder    Rotator cuff tendonitis    Pain in joint, shoulder region    Neck pain    Rotator cuff (capsule) sprain    Cervical radicular pain    Adhesive capsulitis of shoulder    Left knee pain    Chondromalacia patellae of left knee    Primary osteoarthritis of left knee    Baker's cyst of knee    Diverticulitis of large intestine with perforation without abscess or bleeding    Vitamin B12 deficiency    Diverticulitis    GERD (gastroesophageal reflux disease)    Hiatal hernia    Dysthymia    HLD (hyperlipidemia)    Irritable bowel syndrome with constipation    Family history of colon cancer       HPI / ROS: Fer Bobby presents for evaluation and management of :    # acute for PI rash chest wall. Began prednisone and Bactrim. Semed to be cellulitic and started Bactrim but yesterday it spread more. Tender pinkness. More wlty than fluif filled. No fever. But fells ill.       Wt Readings from Last 3 Encounters:   10/14/20 139 lb (63 kg)   10/12/20 137 lb (62.1 kg)   08/26/20 131 lb (59.4 kg)       PE : virtual visit    Skin : pink blushing sl tender diffuse rash of uper chest without broken vesicles

## 2020-10-20 ENCOUNTER — TELEPHONE (OUTPATIENT)
Dept: FAMILY MEDICINE CLINIC | Age: 55
End: 2020-10-20

## 2020-10-20 NOTE — TELEPHONE ENCOUNTER
YULIA--Pt had VV with dr Claudia Courtney yesterday. I called her this afternoon to verify insurance- which is [employer]. Also, she has $15 copay. I did ask for her payment. She stated she was at work in a meeting, and unable to do it at this time.  States she will call the office tomorrow to make the payment

## 2020-11-23 RX ORDER — BUPROPION HYDROCHLORIDE 300 MG/1
TABLET ORAL
Qty: 30 TABLET | Refills: 1 | Status: SHIPPED | OUTPATIENT
Start: 2020-11-23 | End: 2021-01-20

## 2021-01-20 DIAGNOSIS — K58.1 IRRITABLE BOWEL SYNDROME WITH CONSTIPATION: ICD-10-CM

## 2021-01-20 DIAGNOSIS — F34.1 DYSTHYMIA: ICD-10-CM

## 2021-01-20 RX ORDER — LUBIPROSTONE 8 UG/1
CAPSULE, GELATIN COATED ORAL
Qty: 180 CAPSULE | Refills: 1 | OUTPATIENT
Start: 2021-01-20

## 2021-01-20 RX ORDER — LUBIPROSTONE 8 UG/1
CAPSULE, GELATIN COATED ORAL
Qty: 180 CAPSULE | Refills: 1 | Status: SHIPPED | OUTPATIENT
Start: 2021-01-20 | End: 2021-10-12

## 2021-01-20 RX ORDER — BUPROPION HYDROCHLORIDE 300 MG/1
TABLET ORAL
Qty: 30 TABLET | Refills: 1 | Status: SHIPPED | OUTPATIENT
Start: 2021-01-20 | End: 2021-02-05 | Stop reason: SDUPTHER

## 2021-02-03 ENCOUNTER — PATIENT MESSAGE (OUTPATIENT)
Dept: FAMILY MEDICINE CLINIC | Age: 56
End: 2021-02-03

## 2021-02-03 DIAGNOSIS — F34.1 DYSTHYMIA: ICD-10-CM

## 2021-02-03 DIAGNOSIS — E53.8 VITAMIN B12 DEFICIENCY: Primary | ICD-10-CM

## 2021-02-04 NOTE — TELEPHONE ENCOUNTER
From: Ross Ponce  To: Paco Masterson MD  Sent: 2/3/2021 7:12 PM EST  Subject: Prescription Question    I need my B12 monthly vaccines refilled. I have been getting 10 vials at a time at [facility] with syringes. Please call in a refill. I also was wondering why my Wellbutrin is only getting refilled 30 days at a time? Im very sorry you are leaving. I only came to this office because I of you.   Thank you  Shawna Lincoln

## 2021-02-05 RX ORDER — BUPROPION HYDROCHLORIDE 300 MG/1
TABLET ORAL
Qty: 90 TABLET | Refills: 0 | Status: SHIPPED | OUTPATIENT
Start: 2021-02-05 | End: 2021-05-10

## 2021-02-05 RX ORDER — CYANOCOBALAMIN, ISOPROPYL ALCOHOL 1000MCG/ML
1000 KIT INJECTION
Qty: 10 KIT | Refills: 0 | Status: SHIPPED | OUTPATIENT
Start: 2021-02-05 | End: 2021-10-12

## 2021-05-19 DIAGNOSIS — F34.1 DYSTHYMIA: ICD-10-CM

## 2021-05-19 RX ORDER — BUPROPION HYDROCHLORIDE 300 MG/1
TABLET ORAL
Qty: 90 TABLET | Refills: 0 | Status: SHIPPED | OUTPATIENT
Start: 2021-05-19 | End: 2021-05-21 | Stop reason: SDUPTHER

## 2021-05-21 DIAGNOSIS — F34.1 DYSTHYMIA: ICD-10-CM

## 2021-05-21 RX ORDER — BUPROPION HYDROCHLORIDE 300 MG/1
TABLET ORAL
Qty: 90 TABLET | Refills: 0 | Status: SHIPPED | OUTPATIENT
Start: 2021-05-21 | End: 2021-09-16

## 2021-06-29 LAB
CHOLESTEROL, TOTAL: 191 MG/DL (ref 0–199)
GLUCOSE BLD-MCNC: 91 MG/DL (ref 70–99)
HDLC SERPL-MCNC: 82 MG/DL (ref 40–60)
LDL CHOLESTEROL CALCULATED: 96 MG/DL
TRIGL SERPL-MCNC: 65 MG/DL (ref 0–150)

## 2021-08-23 NOTE — PROGRESS NOTES
Melissa Carroll   1965, 55 y.o. female   <[id number]>       Referring Provider: Sirisha Grossman MD  Referral Type: In an order in [address] [place] [address]    Reason for Visit: Evaluation of suspected change in hearing, tinnitus, or balance. ADULT AUDIOLOGIC EVALUATION      Melissa Carroll is a 55 y.o. female seen today, 8/26/2021 , for an initial audiologic evaluation. Patient was seen by Sirisha Grossman MD following today's evaluation. AUDIOLOGIC AND OTHER PERTINENT MEDICAL HISTORY:      Melissa Carroll noted otalgia, tinnitus, history of recreational noise exposure and family history of hearing loss. Patient reports bilateral tinnitus that is constant in nature. She states this is more noticeable in the left ear. Patient notes a history of noise exposure shooting guns. Patient reports they are right handed. . Patient reports mother had a history of hearing loss. Melissa Carroll denied aural fullness, otorrhea, dizziness, imbalance, history of falls, history of head trauma and history of ear surgery. Date: 8/26/2021     IMPRESSIONS:      AU: Hearing WNL sloping to Severe HF SNHL, Excellent WRS, Type A tymp    Hearing loss consistent with high frequency sensorineural hearing loss. Discussed tinnitus and hearing loss with patient. Patient to follow medical recommendations per Sirisha Grossman MD .    ASSESSMENT AND FINDINGS:     Otoscopy revealed: Clear ear canals bilaterally    RIGHT EAR:  Hearing Sensitivity: Normal hearing sensitivity to Severe high frequency sensorineural hearing loss  Speech Recognition Threshold: 15 dB HL  Word Recognition: Excellent (%), based on NU-6  25-word list at 55 dBHL using recorded speech stimuli. Tympanometry: Normal peak pressure and compliance, Type A tympanogram, consistent with normal middle ear function.   Acoustic Reflexes: Ipsilateral: Did not test. Contralateral: Did not test.    LEFT EAR:  Hearing Sensitivity: Normal hearing sensitivity to Severe high frequency sensorineural hearing loss  Speech Recognition Threshold: 15 dB HL  Word Recognition: Excellent (%), based on NU-6 25-word list at 55 dBHL using recorded speech stimuli. Tympanometry: Normal peak pressure and compliance, Type A tympanogram, consistent with normal middle ear function. Acoustic Reflexes: Ipsilateral: Did not test. Contralateral: Did not test.    Reliability: Good  Transducer: Inserts    See scanned audiogram dated 8/26/2021  for results. PATIENT EDUCATION:       The following items were discussed with the patient:    - Good Communication Strategies   - Tinnitus Management Strategies    Educational information was shared in the After Visit Summary. RECOMMENDATIONS:                                                                                                                                                                                                                                                            The following items are recommended based on patient report and results from today's appointment:   - Continue medical follow-up with Tino Amin MD.   - Retest hearing as medically indicated and/or sooner if a change in hearing is noted. - Maintain a sound enriched environment to assist in the management of tinnitus symptoms.          Gauri Rivas  Audiologist    Chart CC'd to: Tino Amin MD      Degree of   Hearing Sensitivity dB Range   Within Normal Limits (WNL) 0 - 20   Mild 20 - 40   Moderate 40 - 55   Moderately-Severe 55 - 70   Severe 70 - 90   Profound 90 +

## 2021-08-23 NOTE — PATIENT INSTRUCTIONS
Tinnitus: Overview and Management Strategies          Many people have some ringing sounds in their ears once in a while. You may hear a roar, a hiss, a tinkle, or a buzz. The sound usually lasts only a few minutes. If it goes on all the time, you may have tinnitus. Tinnitus is usually caused by long-term exposure to loud noise. This damages the nerves in the inner ear. It can occur with all types of hearing loss. It may be a symptom of almost any ear problem. Tinnitus may be caused by a buildup of earwax. Or, it may be caused by ear infections or certain medicines (especially antibiotics or large amounts of aspirin). You can also hear noises in your ears because of an injury to the ears, drinking too much alcohol or caffeine, or a medical condition. Other conditions may also contribute to tinnitus, including: head and neck trauma, temporomandibular joint disorder (TMJ), sinus pressure and barometric trauma, traumatic brain injury, metabolic disorders, autoimmune disorders, stress, and high blood pressure. You may need tests to evaluate your hearing and to find causes of long-lasting tinnitus. Your doctor may suggest one or more treatments to help you cope with the tinnitus. You can also do things at home to help reduce symptoms. Causes of Tinnitus  We do not know the exact cause of tinnitus. One thing we do know is that you are not imagining it. If you have tinnitus, chances are the cause will remain a mystery. Conditions that might cause tinnitus include the following:    Hearing loss    Ménière's disease    Loud noise exposure    Migraines    Head injury    Drugs or medicines that are toxic to hearing    Anemia    High blood pressure    Stress    A lot of wax in the ear    Certain types of tumors    Having a lot of caffeine    Smoking cigarettes  You may find that your tinnitus is worse at night. This happens because it is quiet and you are not distracted.  Feeling tired and stressed may make your tinnitus worse. Follow-up care is a key part of your treatment and safety. Be sure to make and go to all appointments, and call your doctor if you are having problems. It's also a good idea to know your test results and keep a list of the medicines you take. How can you care for yourself at home? · Limit or cut out alcohol, caffeine, and sodium. They can make your symptoms worse. · Do not smoke or use other tobacco products. Nicotine reduces blood flow to the ear and makes tinnitus worse. If you need help quitting, talk to your doctor about stop-smoking programs and medicines. These can increase your chances of quitting for good. · Talk to your doctor about whether to stop taking aspirin and similar products such as ibuprofen or naproxen. · Get exercise often. It can help improve blood flow to the ear. Hearing Aids and Other Devices  A hearing aid may help your tinnitus if you have a hearing loss. An audiologist can help you find and use the best hearing aid for you. Tinnitus maskers look like hearing aids. They make a sound that masks, or covers up, the tinnitus. The masking sound distracts you from the ringing in your ears. You may be able to use a masker and a hearing aid at the same time. Sound machines can be useful at night or during quiet times. There are machines you can buy at the store. Or, you can find apps on your phone that make sounds, like the ocean or rainfall. Fish tanks, fans, quiet music, and indoor waterfalls can help, as well. Ways to manage/cope with tinnitus  Some tinnitus may last a long time. To manage your tinnitus, try to:  · Avoid noises that you think caused your tinnitus. If you can't avoid loud noises, wear earplugs or earmuffs. · Ignore the sound by paying attention to other things. Keeping your brain busy with other tasks or background noise can help your brain not focus on the tinnitus. · Try to not give the tinnitus an emotional reaction.   Do your best to ignore the sound and not let it bother you. Relax using biofeedback, meditation, or yoga. Feeling stressed and being tired can make tinnitus worse. · Play music or white noise to help you sleep. Background noise may cover up the noise that you hear in your ears. You can buy a tabletop machine or a device that sits under your pillow to play soothing sounds, like ocean waves. · Smart phones have free apps, such as Whist, Relax Melodies, ReSound Relief, and White Noise Lite. These apps have different types of sounds/noise, some of which you can blend together to find sounds that are most soothing to you. · Hearing aid technology, especially when there is some hearing loss, may help reduce tinnitus symptoms by giving your brain better access to the sounds it is missing. There are some hearing aids with built-in noise generator programs, which may help when amplification alone is not enough. Additional resources may be found through the [employer]inn[employer] Association at www.mckayla.org    When should you call for help? Call 911 anytime you think you may need emergency care. For example, call if:    · You have symptoms of a stroke. These may include:  ? Sudden numbness, tingling, weakness, or loss of movement in your face, arm, or leg, especially on only one side of your body. ? Sudden vision changes. ? Sudden trouble speaking. ? Sudden confusion or trouble understanding simple statements. ? Sudden problems with walking or balance. ? A sudden, severe headache that is different from past headaches. Call your doctor now or seek immediate medical care if:    · You develop other symptoms. These may include hearing loss (or worse hearing loss), balance problems, dizziness, nausea, or vomiting. Watch closely for changes in your health, and be sure to contact your doctor if:    · Your tinnitus moves from both ears to one ear. · Your hearing loss gets worse within 1 day after an ear injury. · Your tinnitus or hearing loss does not get better within 1 week after an ear injury. · Your tinnitus bothers you enough that you want to take medicines to help you cope with it. If you notice changes in your tinnitus and/or your hearing, it is recommended that you have your hearing tested by your audiologist and to follow-up with your physician that manages your hearing loss (such as your ENT or Primary Care doctor).

## 2021-08-26 ENCOUNTER — OFFICE VISIT (OUTPATIENT)
Dept: ENT CLINIC | Age: 56
End: 2021-08-26
Payer: COMMERCIAL

## 2021-08-26 ENCOUNTER — PROCEDURE VISIT (OUTPATIENT)
Dept: AUDIOLOGY | Age: 56
End: 2021-08-26
Payer: COMMERCIAL

## 2021-08-26 VITALS
HEIGHT: 64 IN | SYSTOLIC BLOOD PRESSURE: 118 MMHG | WEIGHT: 134 LBS | DIASTOLIC BLOOD PRESSURE: 74 MMHG | BODY MASS INDEX: 22.88 KG/M2

## 2021-08-26 DIAGNOSIS — H93.13 TINNITUS OF BOTH EARS: ICD-10-CM

## 2021-08-26 DIAGNOSIS — K21.9 GASTROESOPHAGEAL REFLUX DISEASE WITHOUT ESOPHAGITIS: Primary | ICD-10-CM

## 2021-08-26 DIAGNOSIS — H90.3 SENSORINEURAL HEARING LOSS (SNHL) OF BOTH EARS: Primary | ICD-10-CM

## 2021-08-26 DIAGNOSIS — H90.3 SENSORINEURAL HEARING LOSS (SNHL) OF BOTH EARS: ICD-10-CM

## 2021-08-26 DIAGNOSIS — H91.90 HEARING LOSS, UNSPECIFIED HEARING LOSS TYPE, UNSPECIFIED LATERALITY: Primary | ICD-10-CM

## 2021-08-26 PROCEDURE — 92567 TYMPANOMETRY: CPT | Performed by: AUDIOLOGIST

## 2021-08-26 PROCEDURE — 99203 OFFICE O/P NEW LOW 30 MIN: CPT | Performed by: STUDENT IN AN ORGANIZED HEALTH CARE EDUCATION/TRAINING PROGRAM

## 2021-08-26 PROCEDURE — 92557 COMPREHENSIVE HEARING TEST: CPT | Performed by: AUDIOLOGIST

## 2021-08-26 NOTE — PROGRESS NOTES
[address] [facility] NECK SURGERY  CONSULT      Conception Apley (:  1965) is a 55 y.o. female, here for evaluation of the following chief complaint(s):  Tinnitus      ASSESSMENT/PLAN:  1. Gastroesophageal reflux disease without esophagitis  2. Sensorineural hearing loss (SNHL) of both ears  3. Tinnitus of both ears      This is a very pleasant 55 y.o. female here today for evaluation of the the above-noted complaints. I discussed the patient's audiogram with her in detail. It shows evidence of borderline normal hearing sloping to moderately severe high-frequency sensorineural hearing loss with excellent word recognition scores and type a tympanograms. We discussed the relationship between hearing loss and tinnitus. We discussed different strategies including over-the-counter medication such as ginkgo biloba or Lipo flavonoid, tinnitus retraining therapy for which I would have to refer to the [facility], and tinnitus masking techniques. She will try some of the over-the-counter supplements, and understands that she may not receive any benefit from this. Additionally she will try tinnitus masking techniques at home when she is going to sleep, as this is when her symptoms with worse. I will plan to see her back on an as-needed basis for this going forward. SUBJECTIVE/OBJECTIVE:  [facility]    Conception Apley is here today for evaluation of issues related to difficulty hearing through ears. Patient notes that she will have occasional trouble in crowded situations. She is now having significant ringing in her ears which is worse on the left side. This particularly affects her at night. She denies any pulsatile tinnitus nor otorrhea nor ear pain.     REVIEW OF SYSTEMS  The following systems were reviewed and revealed the following in addition to any already discussed in the HPI:    PHYSICAL EXAM    GENERAL: No acute distress, alert and oriented, no hoarseness, strong voice  EYES: EOMI, Anti-icteric  HENT:   Head: Normocephalic and atraumatic. Face:  Symmetric, facial nerve intact, no sinus tenderness  Right Ear: Normal external ear, normal external auditory canal, intact tympanic membrane with normal mobility and aerated middle ear  Left Ear: Normal external ear, normal external auditory canal, intact tympanic membrane with normal mobility and aerated middle ear  Mouth/Oral Cavity:  normal lips, Uvula is midline, no mucosal lesions, no trismus, normal dentition, normal salivary quality/flow  Oropharynx/Larynx:  normal oropharynx, 1+ tonsils; patient did not tolerate mirror exam due to excessive gag reflex  Nose:Normal external nasal appearance. Anterior rhinoscopy shows  a deviated septum preventing view posteriorly. Normal appearing turbinates. Normal mucosa   NECK: Normal range of motion, no thyromegaly, trachea is midline, no lymphadenopathy, no neck masses, no crepitus  CHEST: Normal respiratory effort, no retractions, breathing comfortably  SKIN: No rashes, normal appearing skin, no evidence of skin lesions/tumors  Neuro:  cranial nerve II-XII intact; normal gait  Cardio:  no edema        PROCEDURE      This note was generated completely or in part utilizing Dragon dictation speech recognition software. Occasionally, words are mistranscribed and despite editing, the text may contain inaccuracies due to incorrect word recognition. If further clarification is needed please contact the office at [phone]. An electronic signature was used to authenticate this note.     --Gary Sorensen MD

## 2021-08-26 NOTE — Clinical Note
Dr. Doris Alcazar,    Please see note from this patient's audiogram from today. Please let me know if there is anything further you need.         Gauri Wiley  Audiologist

## 2021-10-12 ENCOUNTER — OFFICE VISIT (OUTPATIENT)
Dept: FAMILY MEDICINE CLINIC | Age: 56
End: 2021-10-12
Payer: COMMERCIAL

## 2021-10-12 VITALS
WEIGHT: 135.2 LBS | HEART RATE: 72 BPM | DIASTOLIC BLOOD PRESSURE: 66 MMHG | SYSTOLIC BLOOD PRESSURE: 104 MMHG | OXYGEN SATURATION: 100 % | BODY MASS INDEX: 23.21 KG/M2

## 2021-10-12 DIAGNOSIS — F34.1 DYSTHYMIA: ICD-10-CM

## 2021-10-12 DIAGNOSIS — R07.89 CHEST DISCOMFORT: ICD-10-CM

## 2021-10-12 DIAGNOSIS — R23.2 HOT FLASHES: Primary | ICD-10-CM

## 2021-10-12 DIAGNOSIS — E53.8 VITAMIN B12 DEFICIENCY: ICD-10-CM

## 2021-10-12 DIAGNOSIS — K58.1 IRRITABLE BOWEL SYNDROME WITH CONSTIPATION: ICD-10-CM

## 2021-10-12 PROCEDURE — 99213 OFFICE O/P EST LOW 20 MIN: CPT | Performed by: FAMILY MEDICINE

## 2021-10-12 RX ORDER — BUPROPION HYDROCHLORIDE 300 MG/1
300 TABLET ORAL EVERY MORNING
Qty: 90 TABLET | Refills: 3 | Status: SHIPPED | OUTPATIENT
Start: 2021-10-12 | End: 2022-09-23 | Stop reason: SDUPTHER

## 2021-10-12 RX ORDER — CYANOCOBALAMIN, ISOPROPYL ALCOHOL 1000MCG/ML
1000 KIT INJECTION
Qty: 12 KIT | Refills: 0 | Status: SHIPPED | OUTPATIENT
Start: 2021-10-12

## 2021-10-12 RX ORDER — LUBIPROSTONE 8 UG/1
CAPSULE, GELATIN COATED ORAL
Qty: 180 CAPSULE | Refills: 3 | Status: SHIPPED | OUTPATIENT
Start: 2021-10-12 | End: 2021-12-13

## 2021-10-12 NOTE — PROGRESS NOTES
A/P:    Diagnosis Orders   1. Hot flashes  AFL - Britton Rhodes MD, Gynecology, [facility] SPECIALTY [facility]  She will consider a trial of black cohosh or Estrova over-the-counter, with the degree of her symptoms, I feel it is probably time to give hormone replacement a trial   2. Dysthymia, doing reasonably well  continue buPROPion (WELLBUTRIN XL) 300 MG extended release tablet   3. Vitamin B12 deficiency  Continue Cyanocobalamin (B-12 COMPLIANCE INJECTION) 1000 MCG/ML KIT   4. Irritable bowel syndrome with constipation, doing reasonably well  continue lubiprostone (AMITIZA) 8 MCG CAPS capsule       5. Intermittent chest discomfort: echo ordered, she is to be evaluated  She declines Shingrix vaccine for now. Follow-up in 9 months or sooner if needed. O: /66   Pulse 72   Wt 135 lb 3.2 oz (61.3 kg)   LMP 03/07/2017   SpO2 100%   BMI 23.21 kg/m²    Gen- NAD, pleasant  HEENT- Eyes without icterus or injection  Neck- Supple, no lymphadenopathy appreciated  Lungs- CTAB  Heart- RRR  Abd- Soft, non tender  Ext- No edema  Psych- Appropriate    S: CC- med check  HPI-patient presents to establish with new clinic doctor. She reports that she is doing reasonably well overall. She will be having surgery on her thumb soon. She does report chronic, significant hot flashes for many years. She gets some relief from Wellbutrin. She continues injectable B12 for her B12 deficiency. She continues to see Dr. Valerie Martinez for her constipation predominant IBS. She is interested in getting an echo. She has rare chest discomfort on the left which she attributes to GERD. She has a strong family history of mitral valve disease.     ROS-denies fever, chills  Denies dyspnea, palpitations    Patient's medications, allergies, and past medical hx were reviewed

## 2021-10-13 ENCOUNTER — TELEPHONE (OUTPATIENT)
Dept: FAMILY MEDICINE CLINIC | Age: 56
End: 2021-10-13

## 2021-10-13 NOTE — TELEPHONE ENCOUNTER
[facility] called needing clarification on the prescription for PT's B12. They need to know if it needs to be taken IM or under the skin.      Please call back to adv: [phone]

## 2021-10-21 ENCOUNTER — OFFICE VISIT (OUTPATIENT)
Dept: ORTHOPEDIC SURGERY | Age: 56
End: 2021-10-21
Payer: COMMERCIAL

## 2021-10-21 VITALS — BODY MASS INDEX: 23.32 KG/M2 | HEIGHT: 65 IN | RESPIRATION RATE: 16 BRPM | WEIGHT: 140 LBS

## 2021-10-21 DIAGNOSIS — M18.12 ARTHRITIS OF CARPOMETACARPAL (CMC) JOINT OF LEFT THUMB: Primary | ICD-10-CM

## 2021-10-21 PROCEDURE — 99203 OFFICE O/P NEW LOW 30 MIN: CPT | Performed by: ORTHOPAEDIC SURGERY

## 2021-10-21 NOTE — PROGRESS NOTES
atrophy. The Cash test is negative. Skin is intact, as is distal circulation and sensation. Gross muscle strength is normal bilaterally. Hand and wrist joints are stable. There are no subcutaneous nodules or enlarged epitrochlear lymph nodes. AP, Lateral and Rice view xrays of the left thumb and wrist, done in the office today, show minimal narrowing of the trapeziometacarpal and scaphotrapezial joints and marginal osteophyte formation of the trapeziometacarpal joint. There has been little or no progression of these changes from pervious Xrays in 2017 and 2019. I have personally reviewed and interpreted all previous external imaging studies(Xrays, MRI), laboratory tests, diagnostic proceedures and medical encounters pertinent to this patient's visit today. 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 The Xrays are shown to the patient and the MRI results discussed. The nature of this medical problem is fully discussed with the patient, including all treatment options; including use of medications (acetaminaphin, Ibuprofen, naproxen, steroids), braces, injections and surgery, including the appropriate precautions, potential side effects and reasonable expectations of relief. In my opinion neither steroid injection nor surgery are indicated at this time. I believe that she is best served by seeking better medical management/control of her arthritis symptoms and delay any surgery until she is older and less vigorous with her hand usage.

## 2021-11-15 ENCOUNTER — HOSPITAL ENCOUNTER (OUTPATIENT)
Dept: WOMENS IMAGING | Age: 56
Discharge: HOME | End: 2021-11-15
Payer: COMMERCIAL

## 2021-11-15 DIAGNOSIS — Z12.31 BREAST CANCER SCREENING BY MAMMOGRAM: ICD-10-CM

## 2021-11-15 PROCEDURE — 77067 SCR MAMMO BI INCL CAD: CPT

## 2021-12-12 DIAGNOSIS — K58.1 IRRITABLE BOWEL SYNDROME WITH CONSTIPATION: ICD-10-CM

## 2021-12-13 RX ORDER — LUBIPROSTONE 8 UG/1
CAPSULE, GELATIN COATED ORAL
Qty: 180 CAPSULE | Refills: 1 | Status: SHIPPED | OUTPATIENT
Start: 2021-12-13 | End: 2022-06-26

## 2022-01-04 ENCOUNTER — HOSPITAL ENCOUNTER (OUTPATIENT)
Dept: WOMENS IMAGING | Age: 57
Discharge: HOME | End: 2022-01-04
Payer: COMMERCIAL

## 2022-01-04 DIAGNOSIS — Z78.0 MENOPAUSE: ICD-10-CM

## 2022-01-04 PROCEDURE — 77080 DXA BONE DENSITY AXIAL: CPT

## 2022-05-12 ENCOUNTER — NURSE ONLY (OUTPATIENT)
Dept: CARDIOLOGY CLINIC | Age: 57
End: 2022-05-12

## 2022-05-12 PROCEDURE — 93242 EXT ECG>48HR<7D RECORDING: CPT | Performed by: INTERNAL MEDICINE

## 2022-05-13 DIAGNOSIS — R55 NEAR SYNCOPE: ICD-10-CM

## 2022-05-13 LAB
A/G RATIO: 2 (ref 1.1–2.2)
ALBUMIN SERPL-MCNC: 4.6 G/DL (ref 3.4–5)
ALP BLD-CCNC: 58 U/L (ref 40–129)
ALT SERPL-CCNC: 11 U/L (ref 10–40)
ANION GAP SERPL CALCULATED.3IONS-SCNC: 14 MMOL/L (ref 3–16)
AST SERPL-CCNC: 13 U/L (ref 15–37)
BASOPHILS ABSOLUTE: 0 K/UL (ref 0–0.2)
BASOPHILS RELATIVE PERCENT: 0.6 %
BILIRUB SERPL-MCNC: 0.4 MG/DL (ref 0–1)
BUN BLDV-MCNC: 12 MG/DL (ref 7–20)
CALCIUM SERPL-MCNC: 9.6 MG/DL (ref 8.3–10.6)
CHLORIDE BLD-SCNC: 102 MMOL/L (ref 99–110)
CO2: 25 MMOL/L (ref 21–32)
CREAT SERPL-MCNC: 0.7 MG/DL (ref 0.6–1.1)
EOSINOPHILS ABSOLUTE: 0.1 K/UL (ref 0–0.6)
EOSINOPHILS RELATIVE PERCENT: 1.7 %
GFR AFRICAN AMERICAN: >60
GFR NON-AFRICAN AMERICAN: >60
GLUCOSE BLD-MCNC: 87 MG/DL (ref 70–99)
HCT VFR BLD CALC: 36.7 % (ref 36–48)
HEMOGLOBIN: 12.4 G/DL (ref 12–16)
LACTIC ACID: <0.2 MMOL/L (ref 0.4–2)
LYMPHOCYTES ABSOLUTE: 1.5 K/UL (ref 1–5.1)
LYMPHOCYTES RELATIVE PERCENT: 25.4 %
MCH RBC QN AUTO: 31.2 PG (ref 26–34)
MCHC RBC AUTO-ENTMCNC: 33.7 G/DL (ref 31–36)
MCV RBC AUTO: 92.5 FL (ref 80–100)
MONOCYTES ABSOLUTE: 0.5 K/UL (ref 0–1.3)
MONOCYTES RELATIVE PERCENT: 8.5 %
NEUTROPHILS ABSOLUTE: 3.9 K/UL (ref 1.7–7.7)
NEUTROPHILS RELATIVE PERCENT: 63.8 %
PDW BLD-RTO: 13.4 % (ref 12.4–15.4)
PLATELET # BLD: 310 K/UL (ref 135–450)
PMV BLD AUTO: 8.6 FL (ref 5–10.5)
POTASSIUM SERPL-SCNC: 4.8 MMOL/L (ref 3.5–5.1)
RBC # BLD: 3.97 M/UL (ref 4–5.2)
SODIUM BLD-SCNC: 141 MMOL/L (ref 136–145)
TOTAL PROTEIN: 6.9 G/DL (ref 6.4–8.2)
WBC # BLD: 6.1 K/UL (ref 4–11)

## 2022-05-25 PROCEDURE — 93244 EXT ECG>48HR<7D REV&INTERPJ: CPT | Performed by: INTERNAL MEDICINE

## 2022-06-02 DIAGNOSIS — R55 NEAR SYNCOPE: ICD-10-CM

## 2022-08-01 LAB
CHOLESTEROL, TOTAL: 243 MG/DL (ref 0–199)
GLUCOSE BLD-MCNC: 86 MG/DL (ref 70–99)
HDLC SERPL-MCNC: 92 MG/DL (ref 40–60)
LDL CHOLESTEROL CALCULATED: 129 MG/DL
TRIGL SERPL-MCNC: 111 MG/DL (ref 0–150)

## 2022-09-23 DIAGNOSIS — F34.1 DYSTHYMIA: ICD-10-CM

## 2022-09-23 RX ORDER — BUPROPION HYDROCHLORIDE 300 MG/1
TABLET ORAL
Qty: 90 TABLET | Refills: 3 | OUTPATIENT
Start: 2022-09-23

## 2022-09-23 NOTE — TELEPHONE ENCOUNTER
Medication:   Requested Prescriptions     Pending Prescriptions Disp Refills    buPROPion (WELLBUTRIN XL) 300 MG extended release tablet [Pharmacy Med Name: BUPROPION HCL ER (XL) 300MG TB24] 90 tablet 3     Sig: TAKE ONE TABLET BY MOUTH EVERY MORNING        Last Filled:  10/12/2021    Patient Phone Number: [phone] (home) [phone] (work)    Last appt: 10/12/2021   Next appt: Visit date not found    Last OARRS: No flowsheet data found.

## 2022-12-08 ENCOUNTER — OFFICE VISIT (OUTPATIENT)
Dept: ORTHOPEDIC SURGERY | Age: 57
End: 2022-12-08

## 2022-12-08 VITALS — HEIGHT: 65 IN | RESPIRATION RATE: 16 BRPM | BODY MASS INDEX: 22.82 KG/M2 | WEIGHT: 137 LBS

## 2022-12-08 DIAGNOSIS — M17.0 PRIMARY OSTEOARTHRITIS OF BOTH KNEES: Primary | ICD-10-CM

## 2022-12-08 RX ORDER — BUPIVACAINE HYDROCHLORIDE 2.5 MG/ML
2 INJECTION, SOLUTION INFILTRATION; PERINEURAL ONCE
Status: COMPLETED | OUTPATIENT
Start: 2022-12-08 | End: 2022-12-08

## 2022-12-08 RX ORDER — TRIAMCINOLONE ACETONIDE 40 MG/ML
40 INJECTION, SUSPENSION INTRA-ARTICULAR; INTRAMUSCULAR ONCE
Status: COMPLETED | OUTPATIENT
Start: 2022-12-08 | End: 2022-12-08

## 2022-12-08 RX ADMIN — BUPIVACAINE HYDROCHLORIDE 5 MG: 2.5 INJECTION, SOLUTION INFILTRATION; PERINEURAL at 08:30

## 2022-12-08 RX ADMIN — TRIAMCINOLONE ACETONIDE 40 MG: 40 INJECTION, SUSPENSION INTRA-ARTICULAR; INTRAMUSCULAR at 08:30

## 2022-12-08 RX ADMIN — TRIAMCINOLONE ACETONIDE 40 MG: 40 INJECTION, SUSPENSION INTRA-ARTICULAR; INTRAMUSCULAR at 08:31

## 2022-12-11 NOTE — PROGRESS NOTES
[facility] Sp[facility]  Office Visit    Chief Complaint: Bilateral knee pain    HPI:  Jackson Burgos is a 65 y.o. who is here for assessment of bilateral knee pain. She reports bilateral knee pain intermittently for the past 20 years. She has been treated by Dr. Lyudmila Diana in the past with steroid and gel injections in both knees. This was over 5 years ago. She comes in today reporting knee pain on a daily basis that is worse on the left. There is no history of injury or surgery. The pain has been worsening over the past 6 to 8 months. She has mostly anterior knee pain and is worse on steps. She will also experience stiffness when seated for long periods of time. She has been treating self with ibuprofen and lidocaine patches. Pain is 3/10 at rest and increases on steps. Patient Active Problem List   Diagnosis    Foot pain    Adhesive capsulitis of left shoulder    Rotator cuff tendonitis    Pain in joint, shoulder region    Neck pain    Rotator cuff (capsule) sprain    Cervical radicular pain    Adhesive capsulitis of shoulder    Left knee pain    Chondromalacia patellae of left knee    Primary osteoarthritis of left knee    Baker's cyst of knee    Diverticulitis of large intestine with perforation without abscess or bleeding    Vitamin B12 deficiency    Diverticulitis    GERD (gastroesophageal reflux disease)    Hiatal hernia    Dysthymia    HLD (hyperlipidemia)    Irritable bowel syndrome with constipation    Family history of colon cancer    Arthritis of carpometacarpal (CMC) joint of left thumb       ROS:  Constitutional: denies fever, chills, weight loss  MSK: denies pain in other joints, muscle aches  Neurological: denies numbness, tingling, weakness    Exam:  Resp.  rate 16, height 5' 5\" (1.651 m), weight 137 lb (62.1 kg)    Appearance: sitting in exam room chair, appears to be in no acute distress, awake and alert  Resp: unlabored breathing on room air  Skin: warm, dry and intact with out erythema or significant increased temperature  Neuro: grossly intact both lower extremities. Intact sensation to light touch. Motor exam 4+ to 5/5 in all major motor groups. Bilateral knees: Examination reveals that knee range of motion is 0 to 135 degrees. There is patellofemoral crepitus, positive joint line tenderness, positive antalgic gait. Neurologically, plantar flexion and dorsiflexion is intact. 5/5 strength. Imagin views of bilateral knees were performed and interpreted today. She has mild joint space narrowing of the medial and patellofemoral compartments. There are no fractures or dislocations. There are no large periarticular osteophytes. Assessment:  Mild bilateral knee osteoarthritis, predominantly affecting the patellofemoral compartment    Plan:  We discussed the diagnosis and treatment options. She continues to have knee pain on a daily basis is mostly anterior due to patellofemoral arthritis. We discussed treatment options to include self-directed physical therapy exercises, NSAIDs, steroid injections, viscosupplementation injections. She will continue ibuprofen. I recommended and performed bilateral knee steroid injections today as described below. If her symptoms persist despite steroid injection, we may pursue MRI to further evaluate her medial meniscus in each knee. She will follow-up here as needed. PROCEDURE NOTE:  After verbal consent was obtained, bilateral knees were prepped with alcohol and anesthetized with ethyl chloride. Each knee joint was then injected under sterile technique with 2 mL of 0.25% marcaine and 1 mL of 40 mg/mL Kenalog. Bandages were applied. The patient tolerated the procedure well and there were no complications. Total time spent on today's encounter was at least 24 minutes.  This time included reviewing prior notes, radiographs, and lab results when available, reviewing history obtained by medical assistant, performing history and physical exam, reviewing tests/radiographs with the patient, counseling the patient, ordering medications or tests, documentation in the electronic health record, and coordination of care. This dictation was done with Dragon dictation and may contain mechanical errors related to translation.

## 2023-01-11 ENCOUNTER — HOSPITAL ENCOUNTER (OUTPATIENT)
Dept: WOMENS IMAGING | Age: 58
Discharge: HOME | End: 2023-01-11
Payer: COMMERCIAL

## 2023-01-11 DIAGNOSIS — Z12.31 SCREENING MAMMOGRAM FOR HIGH-RISK PATIENT: ICD-10-CM

## 2023-01-11 PROCEDURE — 77067 SCR MAMMO BI INCL CAD: CPT

## 2023-03-15 ENCOUNTER — HOSPITAL ENCOUNTER (OUTPATIENT)
Dept: CT IMAGING | Age: 58
Discharge: HOME | End: 2023-03-15
Payer: COMMERCIAL

## 2023-03-15 DIAGNOSIS — R10.32 LEFT LOWER QUADRANT PAIN: ICD-10-CM

## 2023-03-15 PROCEDURE — 6360000004 HC RX CONTRAST MEDICATION: Performed by: INTERNAL MEDICINE

## 2023-03-15 PROCEDURE — 74178 CT ABD&PLV WO CNTR FLWD CNTR: CPT

## 2023-03-15 RX ADMIN — IOPAMIDOL 75 ML: 755 INJECTION, SOLUTION INTRAVENOUS at 12:25

## 2023-03-15 RX ADMIN — IOPAMIDOL 50 ML: 612 INJECTION, SOLUTION INTRAVENOUS at 12:25

## 2023-04-15 DIAGNOSIS — E53.8 VITAMIN B12 DEFICIENCY: ICD-10-CM

## 2023-04-17 RX ORDER — CYANOCOBALAMIN, ISOPROPYL ALCOHOL 1000MCG/ML
1000 KIT INJECTION
Qty: 12 KIT | Refills: 3 | OUTPATIENT
Start: 2023-04-17

## 2023-04-17 RX ORDER — CYANOCOBALAMIN, ISOPROPYL ALCOHOL 1000MCG/ML
1000 KIT INJECTION
Qty: 12 KIT | Refills: 0 | Status: SHIPPED | OUTPATIENT
Start: 2023-04-17

## 2023-04-17 NOTE — TELEPHONE ENCOUNTER
Medication:   Requested Prescriptions     Pending Prescriptions Disp Refills    Cyanocobalamin (B-12 COMPLIANCE INJECTION) 1000 MCG/ML KIT 12 kit 0     Sig: Inject 1,000 mcg as directed every 30 days        Last Filled:  10/12/2021    Patient Phone Number: [phone] (home) [phone] (work)    Last appt: 10/12/2021   Next appt: Visit date not found    Last OARRS: No flowsheet data found.

## 2023-08-22 LAB
CHOLEST SERPL-MCNC: 252 MG/DL (ref 0–199)
GLUCOSE SERPL-MCNC: 67 MG/DL (ref 70–99)
HDLC SERPL-MCNC: 102 MG/DL (ref 40–60)
LDLC SERPL CALC-MCNC: 132 MG/DL
TRIGL SERPL-MCNC: 90 MG/DL (ref 0–150)

## 2023-10-27 ENCOUNTER — TELEPHONE (OUTPATIENT)
Dept: ORTHOPEDIC SURGERY | Age: 58
End: 2023-10-27

## 2023-10-27 DIAGNOSIS — G89.29 CHRONIC PAIN OF LEFT KNEE: ICD-10-CM

## 2023-10-27 DIAGNOSIS — G89.29 CHRONIC PAIN OF RIGHT KNEE: Primary | ICD-10-CM

## 2023-10-27 DIAGNOSIS — M25.562 CHRONIC PAIN OF LEFT KNEE: ICD-10-CM

## 2023-10-27 DIAGNOSIS — M25.561 CHRONIC PAIN OF RIGHT KNEE: Primary | ICD-10-CM

## 2023-10-27 NOTE — TELEPHONE ENCOUNTER
Per Carmen we are requesting bilateral knee MRI.   Order done in [id number] [facility]

## 2023-10-30 ENCOUNTER — TELEPHONE (OUTPATIENT)
Dept: ORTHOPEDIC SURGERY | Age: 58
End: 2023-10-30

## 2023-11-16 ENCOUNTER — TELEPHONE (OUTPATIENT)
Dept: ORTHOPEDIC SURGERY | Age: 58
End: 2023-11-16

## 2023-11-16 NOTE — TELEPHONE ENCOUNTER
[facility] CALLING. Pt STATES TO THEM, THAT THE R AND L KNEE ARE ALSO SUPPOSED TO INCLUDE THORACIC AND  LUMBAR SCANS, SO SHE CAN DO ALL OF THE MRI'S AT ONE TIME. DOES DR Cindy Pardo KNOW ANYTHING ABOUT THE ADDITIONAL SCANS BEING SENT IN FOR THE Pt, SINCE THEY AREN'T AREAS HE NORMALLY COVERS? PLEASE ADVISE Pt OR [facility] IF DR Cindy Pardo IS ABLE TO ADD THE SCANS FOR BACK/SPINE.

## 2024-01-11 ENCOUNTER — OFFICE VISIT (OUTPATIENT)
Dept: ORTHOPEDIC SURGERY | Age: 59
End: 2024-01-11

## 2024-01-11 VITALS — WEIGHT: 138 LBS | BODY MASS INDEX: 22.99 KG/M2 | HEIGHT: 65 IN

## 2024-01-11 DIAGNOSIS — M17.0 PRIMARY OSTEOARTHRITIS OF BOTH KNEES: Primary | ICD-10-CM

## 2024-01-11 RX ORDER — TRIAMCINOLONE ACETONIDE 40 MG/ML
40 INJECTION, SUSPENSION INTRA-ARTICULAR; INTRAMUSCULAR ONCE
Status: COMPLETED | OUTPATIENT
Start: 2024-01-11 | End: 2024-01-11

## 2024-01-11 RX ORDER — BUPIVACAINE HYDROCHLORIDE 2.5 MG/ML
2 INJECTION, SOLUTION INFILTRATION; PERINEURAL ONCE
Status: COMPLETED | OUTPATIENT
Start: 2024-01-11 | End: 2024-01-11

## 2024-01-11 RX ADMIN — BUPIVACAINE HYDROCHLORIDE 5 MG: 2.5 INJECTION, SOLUTION INFILTRATION; PERINEURAL at 08:45

## 2024-01-11 RX ADMIN — TRIAMCINOLONE ACETONIDE 40 MG: 40 INJECTION, SUSPENSION INTRA-ARTICULAR; INTRAMUSCULAR at 08:45

## 2024-01-11 NOTE — PROGRESS NOTES
mL of 0.25% marcaine and 1 mL of 40 mg/mL Kenalog.  Bandages were applied.  The patient tolerated the procedure well and there were no complications.     Total time spent on today's encounter was at least 24 minutes. This time included reviewing prior notes, radiographs, and lab results when available, reviewing history obtained by medical assistant, performing history and physical exam, reviewing tests/radiographs with the patient, counseling the patient, ordering medications or tests, documentation in the electronic health record, and coordination of care.    This dictation was done with [employer] dictation and may contain mechanical errors related to translation.

## 2024-01-12 ENCOUNTER — HOSPITAL ENCOUNTER (OUTPATIENT)
Dept: WOMENS IMAGING | Age: 59
Discharge: HOME | End: 2024-01-12
Payer: COMMERCIAL

## 2024-01-12 VITALS — BODY MASS INDEX: 23.22 KG/M2 | WEIGHT: 136 LBS | HEIGHT: 64 IN

## 2024-01-12 DIAGNOSIS — Z12.31 BREAST CANCER SCREENING BY MAMMOGRAM: ICD-10-CM

## 2024-01-12 PROCEDURE — 77063 BREAST TOMOSYNTHESIS BI: CPT

## 2024-02-14 ENCOUNTER — OFFICE VISIT (OUTPATIENT)
Dept: FAMILY MEDICINE CLINIC | Age: 59
End: 2024-02-14
Payer: COMMERCIAL

## 2024-02-14 VITALS
WEIGHT: 140.4 LBS | SYSTOLIC BLOOD PRESSURE: 110 MMHG | DIASTOLIC BLOOD PRESSURE: 80 MMHG | HEIGHT: 64 IN | BODY MASS INDEX: 23.97 KG/M2 | TEMPERATURE: 99.1 F

## 2024-02-14 DIAGNOSIS — Z78.0 OSTEOPENIA AFTER MENOPAUSE: ICD-10-CM

## 2024-02-14 DIAGNOSIS — R42 DIZZINESS: ICD-10-CM

## 2024-02-14 DIAGNOSIS — M85.80 OSTEOPENIA AFTER MENOPAUSE: ICD-10-CM

## 2024-02-14 DIAGNOSIS — E53.8 VITAMIN B12 DEFICIENCY: Primary | ICD-10-CM

## 2024-02-14 PROCEDURE — 99203 OFFICE O/P NEW LOW 30 MIN: CPT

## 2024-02-14 RX ORDER — CYANOCOBALAMIN, ISOPROPYL ALCOHOL 1000MCG/ML
1000 KIT INJECTION
Qty: 12 KIT | Refills: 2 | Status: SHIPPED | OUTPATIENT
Start: 2024-02-14

## 2024-02-14 NOTE — PROGRESS NOTES
Sabina Rios (:  1965) is a 58 y.o. female,New patient, here for evaluation of the following chief complaint(s):  New Patient (To get establish care - no new issues)      ASSESSMENT/PLAN:  1. Vitamin B12 deficiency  Assessment & Plan:   At goal, continue current medications and continue current treatment plan  Orders:  -     Cyanocobalamin (B-12 COMPLIANCE INJECTION) 1000 MCG/ML KIT; Inject 1,000 mcg as directed every 30 days, Disp-12 kit, R-2Normal  2. Osteopenia after menopause  Patient getting DEXA every 2 years for osteopenia. Will schedule because she is due. Denies falls, fractures.  -     DEXA BONE DENSITY AXIAL SKELETON; Future  3. Dizziness  Continues to have intermittent dizziness without known cause. Family hx of heart disease, will get echo as suggested by cardiology.  -     ECHO 2D WO Color Doppler Complete; Future      Return in about 1 year (around 2025) for physical.    SUBJECTIVE/OBJECTIVE:  Digna presents to establish care. She lives in [place] with her . PMH significant for diverticulitis, GERD, B12 def, hearing loss, hiatal hernia, HLD. Former smoker. Compliant with medications.     She is doing well overall. Following with ortho and GI.   She has done steroid injections in her right knee, will be getting new cartilage injection soon.     Up to date on mammo. Colonoscopy may be due. Denies blood in stool. History of IBS, occasional abdominal pain, constipation/diarrhea. She stays well hydrated.    She saw cardiology a while back for intermittent dizziness. She did the holter monitor but did not find any abnormalities.  Recommended echo study, not done yet. She continues to have dizziness. Denies SOB, CP, palpitations.    Mood stable on wellbutrin at this time.    Past Medical History:   Diagnosis Date    Arthritis     B12 deficiency     Bilateral hearing loss     Diverticulitis 2018    Dysthymia     Family history of colon cancer     2 grandpa's and a sister.

## 2024-02-15 ENCOUNTER — TELEPHONE (OUTPATIENT)
Dept: ORTHOPEDIC SURGERY | Age: 59
End: 2024-02-15

## 2024-02-15 NOTE — TELEPHONE ENCOUNTER
I called the patient and left a message.  Patient may be scheduled for Visco 3 purchased thru pharmacy

## 2024-02-15 NOTE — TELEPHONE ENCOUNTER
General Question     Subject: SOLO KNEE INJECTION SCHEDULE   Patient and /or Facility Request: Sabina Rios \"Digna\"  Contact Number: [phone]    PATIENT CALLED IN TO SCHEDULE HER SOLO KNEE INJECTION..    PLEASE ADVISE

## 2024-02-15 NOTE — TELEPHONE ENCOUNTER
PATIENT CALLED IN TO SCHEDULE INJ TELLS ME SHE WAS ADVISED THAT [facility] HAD TO SCHEDULE THE INJECTIONS    I THEN REACHED OUT TO MARCELINO AND RADHA, RADHA ADVISED I COULD SCHED INJECTIONS IF THEY WERE APPROVED    PATIENT DISCONNECTED CALL BEFORE I COULD TELL HER THE INFO GATHERED    TRIED CALLING PATIENT BACK 2X NO ANSWER STRAIGHT TO VM

## 2024-02-23 ENCOUNTER — OFFICE VISIT (OUTPATIENT)
Dept: ORTHOPEDIC SURGERY | Age: 59
End: 2024-02-23

## 2024-02-23 VITALS — BODY MASS INDEX: 23.9 KG/M2 | RESPIRATION RATE: 16 BRPM | WEIGHT: 140 LBS | HEIGHT: 64 IN

## 2024-02-23 DIAGNOSIS — M17.0 PRIMARY OSTEOARTHRITIS OF BOTH KNEES: Primary | ICD-10-CM

## 2024-02-23 NOTE — PROGRESS NOTES
[facility]  Office Visit    Chief Complaint: Bilateral knee pain    HPI:  Sabina Rios is a 58 y.o. who is here in follow-up of bilateral knee pain.  She was last seen for this issue last months at which time she underwent bilateral knee steroid injections.  She does not recall these being particularly helpful.  For review, she reports bilateral knee pain intermittently for the past 20 years.  She has been treated by Dr. Venegas in the past with steroid and viscosupplementation injections in both knees. She comes in today reporting knee pain on a daily basis that is worse on the right.  There is no history of injury or surgery.  She has mostly anterior knee pain and is worse on steps.  She will also experience stiffness when seated for long periods of time.  Recent bilateral knee MRI showed patellofemoral arthritis with degenerative medial meniscus tears.      Patient Active Problem List   Diagnosis    Foot pain    Adhesive capsulitis of left shoulder    Rotator cuff tendonitis    Pain in joint, shoulder region    Neck pain    Cervical radicular pain    Left knee pain    Chondromalacia patellae of left knee    Primary osteoarthritis of left knee    Baker's cyst of knee    Diverticulitis of large intestine with perforation without abscess or bleeding    Vitamin B12 deficiency    Diverticulitis    GERD (gastroesophageal reflux disease)    Hiatal hernia    Dysthymia    HLD (hyperlipidemia)    Irritable bowel syndrome with constipation    Family history of colon cancer    Arthritis of carpometacarpal (CMC) joint of left thumb    Osteopenia after menopause    Dizziness       ROS:  Constitutional: denies fever, chills, weight loss  MSK: denies pain in other joints, muscle aches  Neurological: denies numbness, tingling, weakness    Exam:  Appearance: sitting in exam room chair, appears to be in no acute distress, awake and alert  Resp: unlabored breathing on room air  Skin: warm, dry and

## 2024-03-01 ENCOUNTER — OFFICE VISIT (OUTPATIENT)
Dept: ORTHOPEDIC SURGERY | Age: 59
End: 2024-03-01

## 2024-03-01 VITALS — WEIGHT: 140 LBS | BODY MASS INDEX: 23.9 KG/M2 | HEIGHT: 64 IN

## 2024-03-01 DIAGNOSIS — M17.0 PRIMARY OSTEOARTHRITIS OF BOTH KNEES: Primary | ICD-10-CM

## 2024-03-08 ENCOUNTER — OFFICE VISIT (OUTPATIENT)
Dept: ORTHOPEDIC SURGERY | Age: 59
End: 2024-03-08

## 2024-03-08 VITALS — HEIGHT: 64 IN | WEIGHT: 140 LBS | BODY MASS INDEX: 23.9 KG/M2

## 2024-03-08 DIAGNOSIS — M17.0 PRIMARY OSTEOARTHRITIS OF BOTH KNEES: Primary | ICD-10-CM

## 2024-03-09 NOTE — PROGRESS NOTES
This dictation was done with [employer] dictation and may contain mechanical errors related to translation.  Height 1.626 m (5' 4\"), weight 63.5 kg (140 lb), last menstrual period 03/07/2017.    This is a very pleasant 58-year-old female who has osteoarthritis of both of her knees she is undergoing the Visco 3 supplementation injection therapy has had some difficulty with her left knee with these injections but is seeing it out.  She is here for her third and final injection.  Her knees on examination have very minimal swelling good range of motion look essentially normal and unremarkable.  This is a pleasant well-developed healthy 58-year-old female with ongoing pain from osteoarthritis in her left and right knee    Bilateral knee osteoarthritis    With her consent we did proceed and she was injected in the appropriate sterile fashion with the Visco 3 into the left and the right knee joint.  She tolerated well we had a discussion of postinjection activities and we will see her back in 2 to 4 weeks if she is not getting significant improvement since it has been a while since her last cortisone shot we would look at repeating that or possibly getting an MRI

## 2024-03-27 ENCOUNTER — HOSPITAL ENCOUNTER (OUTPATIENT)
Dept: WOMENS IMAGING | Age: 59
Discharge: HOME | End: 2024-03-27
Payer: COMMERCIAL

## 2024-03-27 DIAGNOSIS — Z78.0 OSTEOPENIA AFTER MENOPAUSE: ICD-10-CM

## 2024-03-27 DIAGNOSIS — M85.80 OSTEOPENIA AFTER MENOPAUSE: ICD-10-CM

## 2024-03-27 PROCEDURE — 77080 DXA BONE DENSITY AXIAL: CPT

## 2024-03-29 ENCOUNTER — PATIENT MESSAGE (OUTPATIENT)
Dept: FAMILY MEDICINE CLINIC | Age: 59
End: 2024-03-29

## 2024-03-29 DIAGNOSIS — M81.0 OSTEOPOROSIS WITHOUT CURRENT PATHOLOGICAL FRACTURE, UNSPECIFIED OSTEOPOROSIS TYPE: Primary | ICD-10-CM

## 2024-03-29 NOTE — TELEPHONE ENCOUNTER
From: MARLEN SOMMER  To: Sabina Rios  Sent: 3/29/2024 10:26 AM EDT  Subject: dexa scan results    Your dexa scanned showed signs of osteopenia and osteoporosis. Based on this, we will plan to treat you to prevent further decline in bone loss. Before we choose a medicine, I will need some baseline labs which I sent to the lab for you to get whenever convenient, no need to fast. In the mean time I do recommend Vitamin D and calcium supplements over the counter. Once you get labs, I will be in touch with next steps.

## 2024-04-01 ENCOUNTER — TELEPHONE (OUTPATIENT)
Dept: FAMILY MEDICINE CLINIC | Age: 59
End: 2024-04-01

## 2024-04-01 DIAGNOSIS — R11.0 NAUSEA: ICD-10-CM

## 2024-04-01 DIAGNOSIS — R11.0 NAUSEA: Primary | ICD-10-CM

## 2024-04-01 DIAGNOSIS — M81.0 OSTEOPOROSIS WITHOUT CURRENT PATHOLOGICAL FRACTURE, UNSPECIFIED OSTEOPOROSIS TYPE: ICD-10-CM

## 2024-04-01 LAB
25(OH)D3 SERPL-MCNC: 65.3 NG/ML
ALBUMIN SERPL-MCNC: 4.6 G/DL (ref 3.4–5)
ALBUMIN/GLOB SERPL: 2.2 {RATIO} (ref 1.1–2.2)
ALP SERPL-CCNC: 62 U/L (ref 40–129)
ALT SERPL-CCNC: 16 U/L (ref 10–40)
ANION GAP SERPL CALCULATED.3IONS-SCNC: 11 MMOL/L (ref 3–16)
AST SERPL-CCNC: 17 U/L (ref 15–37)
BILIRUB SERPL-MCNC: 0.6 MG/DL (ref 0–1)
BUN SERPL-MCNC: 14 MG/DL (ref 7–20)
CALCIUM SERPL-MCNC: 9.9 MG/DL (ref 8.3–10.6)
CHLORIDE SERPL-SCNC: 100 MMOL/L (ref 99–110)
CO2 SERPL-SCNC: 28 MMOL/L (ref 21–32)
CREAT SERPL-MCNC: 0.8 MG/DL (ref 0.6–1.1)
GFR SERPLBLD CREATININE-BSD FMLA CKD-EPI: 85 ML/MIN/{1.73_M2}
GLUCOSE SERPL-MCNC: 90 MG/DL (ref 70–99)
LIPASE SERPL-CCNC: 27 U/L (ref 13–60)
POTASSIUM SERPL-SCNC: 4.2 MMOL/L (ref 3.5–5.1)
PROT SERPL-MCNC: 6.7 G/DL (ref 6.4–8.2)
SODIUM SERPL-SCNC: 139 MMOL/L (ref 136–145)

## 2024-04-01 NOTE — TELEPHONE ENCOUNTER
----- Message from Sabina Rios sent at 4/1/2024  6:47 AM EDT -----  Regarding: Labs  Contact: [phone]  Vera    can you add a liver panel and pancreatic enzymes to my labs this morning?    I have I had a headache and nauseated for over a week, every day.   I used all the zofran I had so I am struggling to not vomit.      The  said it can easily be added to my lab draw this morning    Thank you  Digna

## 2024-04-03 ENCOUNTER — TELEPHONE (OUTPATIENT)
Dept: FAMILY MEDICINE CLINIC | Age: 59
End: 2024-04-03

## 2024-04-03 ENCOUNTER — HOSPITAL ENCOUNTER (OUTPATIENT)
Dept: CT IMAGING | Age: 59
Discharge: HOME | End: 2024-04-03
Payer: COMMERCIAL

## 2024-04-03 DIAGNOSIS — R10.11 RIGHT UPPER QUADRANT ABDOMINAL PAIN: Primary | ICD-10-CM

## 2024-04-03 DIAGNOSIS — R10.11 RIGHT UPPER QUADRANT ABDOMINAL PAIN: ICD-10-CM

## 2024-04-03 PROCEDURE — 74177 CT ABD & PELVIS W/CONTRAST: CPT

## 2024-04-03 PROCEDURE — 6360000004 HC RX CONTRAST MEDICATION

## 2024-04-03 RX ORDER — ONDANSETRON 4 MG/1
8 TABLET, FILM COATED ORAL 3 TIMES DAILY PRN
Qty: 15 TABLET | Refills: 0 | Status: SHIPPED | OUTPATIENT
Start: 2024-04-03 | End: 2024-04-10

## 2024-04-03 RX ORDER — DICYCLOMINE HYDROCHLORIDE 10 MG/1
10 CAPSULE ORAL
Qty: 28 CAPSULE | Refills: 0 | Status: SHIPPED | OUTPATIENT
Start: 2024-04-03 | End: 2024-04-10

## 2024-04-03 RX ORDER — AMOXICILLIN AND CLAVULANATE POTASSIUM 875; 125 MG/1; MG/1
1 TABLET, FILM COATED ORAL 2 TIMES DAILY
Qty: 14 TABLET | Refills: 0 | Status: SHIPPED | OUTPATIENT
Start: 2024-04-03 | End: 2024-04-10

## 2024-04-03 RX ADMIN — IOPAMIDOL 75 ML: 755 INJECTION, SOLUTION INTRAVENOUS at 12:37

## 2024-04-03 NOTE — TELEPHONE ENCOUNTER
Pt says that she has had very bad abdominal pain for about a week. She says that its right over top of her liver and pancreas. She has been unable to eat for the past 2 days. Which is causing her to feel very weak with headaches. She would like to know if she can have an ultrasound done. Or if there are any scans that you think she can have done for this. 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     Digna   [phone]

## 2024-04-03 NOTE — TELEPHONE ENCOUNTER
Patient called the office regarding antibiotic. [facility] state they did not receive this. RX was called in with verbal order to [facility].

## 2024-04-12 DIAGNOSIS — M81.0 OSTEOPOROSIS WITHOUT CURRENT PATHOLOGICAL FRACTURE, UNSPECIFIED OSTEOPOROSIS TYPE: Primary | ICD-10-CM

## 2024-04-12 RX ORDER — ALENDRONATE SODIUM 70 MG/1
70 TABLET ORAL
Qty: 13 TABLET | Refills: 2 | Status: SHIPPED | OUTPATIENT
Start: 2024-04-12

## 2024-04-15 DIAGNOSIS — M81.0 SENILE OSTEOPOROSIS: Primary | ICD-10-CM

## 2024-04-15 RX ORDER — DENOSUMAB 60 MG/ML
60 INJECTION SUBCUTANEOUS ONCE
Qty: 1 ML | Refills: 0 | Status: SHIPPED | OUTPATIENT
Start: 2024-04-15 | End: 2024-04-15

## 2024-04-15 RX ORDER — ONDANSETRON 2 MG/ML
8 INJECTION INTRAMUSCULAR; INTRAVENOUS
OUTPATIENT
Start: 2024-04-15

## 2024-04-15 RX ORDER — SODIUM CHLORIDE 9 MG/ML
INJECTION, SOLUTION INTRAVENOUS CONTINUOUS
OUTPATIENT
Start: 2024-04-15

## 2024-04-15 RX ORDER — EPINEPHRINE 1 MG/ML
0.3 INJECTION, SOLUTION, CONCENTRATE INTRAVENOUS PRN
OUTPATIENT
Start: 2024-04-15

## 2024-04-15 RX ORDER — ALBUTEROL SULFATE 90 UG/1
4 AEROSOL, METERED RESPIRATORY (INHALATION) PRN
OUTPATIENT
Start: 2024-04-15

## 2024-04-15 RX ORDER — DIPHENHYDRAMINE HYDROCHLORIDE 50 MG/ML
50 INJECTION INTRAMUSCULAR; INTRAVENOUS
OUTPATIENT
Start: 2024-04-15

## 2024-04-15 RX ORDER — ACETAMINOPHEN 325 MG/1
650 TABLET ORAL
OUTPATIENT
Start: 2024-04-15

## 2024-04-16 ENCOUNTER — TELEPHONE (OUTPATIENT)
Dept: ORTHOPEDIC SURGERY | Age: 59
End: 2024-04-16

## 2024-04-17 ENCOUNTER — TELEPHONE (OUTPATIENT)
Dept: FAMILY MEDICINE CLINIC | Age: 59
End: 2024-04-17

## 2024-04-17 NOTE — TELEPHONE ENCOUNTER
Josi from [facility] called patient has [employer] Plan and [employer]  needs to do PA authorization for the Prolia  the phone number [phone]. And then fax once approved to the  [facility] at  [phone].    Josi, [phone]  Or [phone]

## 2024-04-22 ENCOUNTER — OFFICE VISIT (OUTPATIENT)
Dept: ORTHOPEDIC SURGERY | Age: 59
End: 2024-04-22
Payer: COMMERCIAL

## 2024-04-22 DIAGNOSIS — M17.0 PRIMARY OSTEOARTHRITIS OF BOTH KNEES: Primary | ICD-10-CM

## 2024-04-22 PROCEDURE — 99213 OFFICE O/P EST LOW 20 MIN: CPT | Performed by: ORTHOPAEDIC SURGERY

## 2024-04-22 PROCEDURE — 20610 DRAIN/INJ JOINT/BURSA W/O US: CPT | Performed by: ORTHOPAEDIC SURGERY

## 2024-04-22 RX ORDER — TRIAMCINOLONE ACETONIDE 40 MG/ML
40 INJECTION, SUSPENSION INTRA-ARTICULAR; INTRAMUSCULAR ONCE
Status: COMPLETED | OUTPATIENT
Start: 2024-04-22 | End: 2024-04-22

## 2024-04-22 RX ORDER — DENOSUMAB 60 MG/ML
60 INJECTION SUBCUTANEOUS ONCE
Qty: 1 ML | Refills: 0 | Status: SHIPPED | OUTPATIENT
Start: 2024-04-22 | End: 2024-04-23

## 2024-04-22 RX ORDER — BUPIVACAINE HYDROCHLORIDE 2.5 MG/ML
2 INJECTION, SOLUTION INFILTRATION; PERINEURAL ONCE
Status: COMPLETED | OUTPATIENT
Start: 2024-04-22 | End: 2024-04-22

## 2024-04-22 RX ADMIN — TRIAMCINOLONE ACETONIDE 40 MG: 40 INJECTION, SUSPENSION INTRA-ARTICULAR; INTRAMUSCULAR at 08:24

## 2024-04-22 RX ADMIN — BUPIVACAINE HYDROCHLORIDE 5 MG: 2.5 INJECTION, SOLUTION INFILTRATION; PERINEURAL at 08:23

## 2024-04-22 NOTE — TELEPHONE ENCOUNTER
UMR requires a PA for Zacarias Prater at [facility].  Once obtained I will ask Vera Vásquez CNP to send in a New Rx to [facility].

## 2024-04-22 NOTE — PROGRESS NOTES
counseling the patient, ordering medications or tests, documentation in the electronic health record, and coordination of care.    This dictation was done with [employer] dictation and may contain mechanical errors related to translation.

## 2024-04-22 NOTE — TELEPHONE ENCOUNTER
Contacted [facility] with [employer]MR at [phone] to request a PA for Prolia injection.    PA request in process. No Case number given/available.    Vera, Please sign Pended Order to be faxed to [phone].    Left message for patient to return call.

## 2024-04-23 ENCOUNTER — TELEPHONE (OUTPATIENT)
Dept: FAMILY MEDICINE CLINIC | Age: 59
End: 2024-04-23

## 2024-04-23 RX ORDER — DENOSUMAB 60 MG/ML
60 INJECTION SUBCUTANEOUS ONCE
Qty: 1 ML | Refills: 0 | Status: SHIPPED | OUTPATIENT
Start: 2024-04-23 | End: 2024-04-23

## 2024-04-23 NOTE — TELEPHONE ENCOUNTER
----- Message from Sabina Rios sent at 4/23/2024 10:01 AM EDT -----  Regarding: Return call from Sameera  Contact: [phone]  I received a call from Sameera about the Prolia.  I have tried to call back but the phone just rings.    Please call my cell so we can touch base please    [phone]    Digna

## 2024-05-10 ENCOUNTER — HOSPITAL ENCOUNTER (OUTPATIENT)
Dept: INFUSION THERAPY | Age: 59
Setting detail: INFUSION SERIES
Discharge: HOME | End: 2024-05-10
Payer: COMMERCIAL

## 2024-05-10 VITALS
HEART RATE: 84 BPM | SYSTOLIC BLOOD PRESSURE: 115 MMHG | RESPIRATION RATE: 16 BRPM | TEMPERATURE: 98.7 F | OXYGEN SATURATION: 98 % | DIASTOLIC BLOOD PRESSURE: 70 MMHG

## 2024-05-10 DIAGNOSIS — M81.0 SENILE OSTEOPOROSIS: Primary | ICD-10-CM

## 2024-05-10 PROCEDURE — 96372 THER/PROPH/DIAG INJ SC/IM: CPT

## 2024-05-10 RX ORDER — ACETAMINOPHEN 325 MG/1
650 TABLET ORAL
Status: CANCELLED | OUTPATIENT
Start: 2024-11-08

## 2024-05-10 RX ORDER — ONDANSETRON 2 MG/ML
8 INJECTION INTRAMUSCULAR; INTRAVENOUS
Status: CANCELLED | OUTPATIENT
Start: 2024-11-08

## 2024-05-10 RX ORDER — DIPHENHYDRAMINE HYDROCHLORIDE 50 MG/ML
50 INJECTION INTRAMUSCULAR; INTRAVENOUS
Status: CANCELLED | OUTPATIENT
Start: 2024-11-08

## 2024-05-10 RX ORDER — SODIUM CHLORIDE 9 MG/ML
INJECTION, SOLUTION INTRAVENOUS CONTINUOUS
Status: CANCELLED | OUTPATIENT
Start: 2024-11-08

## 2024-05-10 RX ORDER — EPINEPHRINE 1 MG/ML
0.3 INJECTION, SOLUTION, CONCENTRATE INTRAVENOUS PRN
Status: CANCELLED | OUTPATIENT
Start: 2024-11-08

## 2024-05-10 RX ORDER — ALBUTEROL SULFATE 90 UG/1
4 AEROSOL, METERED RESPIRATORY (INHALATION) PRN
Status: CANCELLED | OUTPATIENT
Start: 2024-11-08

## 2024-05-10 NOTE — DISCHARGE INSTRUCTIONS
Outpatient Infusion Discharge Instructions  [facility]  [address] [place], [place] [address]  Telephone: [phone]      FAX [phone]    NAME:  Sabina Rios  YOB: 1965  MEDICAL RECORD NUMBER:  [id number]  DATE:  @ED@    Reason for Outpatient Infusion Visit: Prolia    If you develop any these symptoms please contact you Doctor    [x] Nausea and/or vomiting not relieved with medication   [x] Swelling, redness, and/or bleeding at injection or IV site    [x] Fever or chills  [x] Rash or itching   [x] Shortness of breath  [x] Please review After Visit Summary (AVS) information on    [] Other      Outpatient Infusion Center Information: Should you experience any significant changes in your health or have questions about your care please contact the [facility] at [phone] MONDAY - FRIDAY 8:00 am - 4:00 pm.  If you need help outside these hours and cannot wait until we are again available, contact your Primary Care Physician or go to the hospital emergency room.       Electronically signed by Sophy Van RN on 5/10/2024 at 1:18 PM

## 2024-05-10 NOTE — PROGRESS NOTES
Outpatient Infusion Center  [facility]     Prolia Visit    NAME:  Sabina Rios  YOB: 1965  MEDICAL RECORD NUMBER:  [id number]  Episode Date:  5/10/2024    Patient arrived to Outpatient Infusion Center   [] per wheelchair   [x] ambulatory     Is this the patient's first Prolia Injection? Yes     Date of last Prolia Injection ?  NA  , .     Did the patient experience any adverse reactions to Prolia Injection? NA    Any recent oral or dental surgery? No    Any recent active fever, infections and/or illnesses? No    Patient has history of pathological fracture? No    Patient has had a recent fracture due to trauma or injury? No    Patient has had a recent orthopedic surgery or procedure done? No    Approximate date of last Dexa scan? 2/14/2024       /70   Pulse 84   Temp 98.7 °F (37.1 °C) (Oral)   Resp 16   LMP 03/07/2017   SpO2 98%     Current Lab Data:    Calcium:    Lab Results   Component Value Date/Time    CALCIUM 9.9 04/01/2024 06:47 AM       Patient Currently taking Calcium Supplements? Yes calicum     Prolia administered: Yes    Prolia dosage: 60 mg was administered slowly subcutaneously into the right upper arm.      Response to treatment:  Well tolerated by patient.     Due for next dose of Prolia after  six months  , .     Electronically signed by Sophy Van RN on 5/10/2024 at 1:22 PM

## 2024-05-31 LAB
CHOLEST SERPL-MCNC: 250 MG/DL (ref 0–199)
GLUCOSE SERPL-MCNC: 67 MG/DL (ref 70–99)
HDLC SERPL-MCNC: 92 MG/DL (ref 40–60)
LDLC SERPL CALC-MCNC: 140 MG/DL
TRIGL SERPL-MCNC: 88 MG/DL (ref 0–150)

## 2024-06-05 RX ORDER — ONDANSETRON 8 MG/1
8 TABLET, ORALLY DISINTEGRATING ORAL EVERY 8 HOURS PRN
COMMUNITY

## 2024-06-05 NOTE — PROGRESS NOTES
[facility] ENDOSCOPY COLONOSCOPY PRE-OPERATIVE INSTRUCTIONS    Procedure date__06/12/2024_______Arrival time__1230__________        Surgery time___1330_________     Having Flexible sigmoidoscopy and EGD at this encouner.      Clear liquids the day before the procedure. Do not eat or drink anything within 5 hours of your procedure.    This includes water chewing gum, mints and ice chips.   You may brush your teeth and gargle the morning of your surgery, but do not swallow the water    You may be asked to stop blood thinners such as Coumadin, Plavix, Fragmin, Lovenox, etc., or any anti-inflammatories such as:  Aspirin, Ibuprofen, Advil, Naproxen prior to your procedure.   We also ask that you stop any OTC medications such as fish oil, vitamin E, glucosamine, garlic, Multivitamins, COQ 10, etc.    You must make arrangements for a responsible adult to arrive with you and stay in our waiting area during your procedure.  They will also need to take you home after your procedure.    For your safety you will not be allowed to leave alone or drive yourself home.    Also for your safety, it is strongly suggested that someone stay with you the first 24 hours after your procedure.    For your comfort, please wear simple loose fitting clothing to the center.  Please do not bring valuables.      If you have a living will and a durable power of  for healthcare, please bring in a copy.     You will need to bring a photo ID and insurance card    Our goal is to provide you with excellent care so if you have any questions, please contact us at the [facility] at [phone]         Please note these are generalized instructions for all colonoscopy cases, you may be provided with more specific instructions if necessary

## 2024-06-11 ENCOUNTER — ANESTHESIA EVENT (OUTPATIENT)
Dept: ENDOSCOPY | Age: 59
End: 2024-06-11
Payer: COMMERCIAL

## 2024-06-12 ENCOUNTER — HOSPITAL ENCOUNTER (OUTPATIENT)
Age: 59
Setting detail: OUTPATIENT SURGERY
Discharge: HOME | End: 2024-06-12
Attending: INTERNAL MEDICINE | Admitting: INTERNAL MEDICINE
Payer: COMMERCIAL

## 2024-06-12 ENCOUNTER — ANESTHESIA (OUTPATIENT)
Dept: ENDOSCOPY | Age: 59
End: 2024-06-12
Payer: COMMERCIAL

## 2024-06-12 VITALS
HEART RATE: 81 BPM | WEIGHT: 136 LBS | RESPIRATION RATE: 16 BRPM | OXYGEN SATURATION: 100 % | SYSTOLIC BLOOD PRESSURE: 117 MMHG | DIASTOLIC BLOOD PRESSURE: 72 MMHG | TEMPERATURE: 97.5 F | BODY MASS INDEX: 23.22 KG/M2 | HEIGHT: 64 IN

## 2024-06-12 DIAGNOSIS — R11.0 NAUSEA: ICD-10-CM

## 2024-06-12 DIAGNOSIS — R10.32 LEFT LOWER QUADRANT PAIN: ICD-10-CM

## 2024-06-12 PROCEDURE — 3609008400 HC SIGMOIDOSCOPY DIAGNOSTIC: Performed by: INTERNAL MEDICINE

## 2024-06-12 PROCEDURE — 2580000003 HC RX 258: Performed by: ANESTHESIOLOGY

## 2024-06-12 PROCEDURE — 3609012400 HC EGD TRANSORAL BIOPSY SINGLE/MULTIPLE: Performed by: INTERNAL MEDICINE

## 2024-06-12 PROCEDURE — 2580000003 HC RX 258: Performed by: NURSE ANESTHETIST, CERTIFIED REGISTERED

## 2024-06-12 PROCEDURE — 3700000001 HC ADD 15 MINUTES (ANESTHESIA): Performed by: INTERNAL MEDICINE

## 2024-06-12 PROCEDURE — 3609017700 HC EGD DILATION GASTRIC/DUODENAL STRICTURE: Performed by: INTERNAL MEDICINE

## 2024-06-12 PROCEDURE — 3700000000 HC ANESTHESIA ATTENDED CARE: Performed by: INTERNAL MEDICINE

## 2024-06-12 PROCEDURE — C1726 CATH, BAL DIL, NON-VASCULAR: HCPCS | Performed by: INTERNAL MEDICINE

## 2024-06-12 PROCEDURE — 88305 TISSUE EXAM BY PATHOLOGIST: CPT

## 2024-06-12 PROCEDURE — 2500000003 HC RX 250 WO HCPCS: Performed by: NURSE ANESTHETIST, CERTIFIED REGISTERED

## 2024-06-12 PROCEDURE — 7100000010 HC PHASE II RECOVERY - FIRST 15 MIN: Performed by: INTERNAL MEDICINE

## 2024-06-12 PROCEDURE — 7100000011 HC PHASE II RECOVERY - ADDTL 15 MIN: Performed by: INTERNAL MEDICINE

## 2024-06-12 PROCEDURE — 2709999900 HC NON-CHARGEABLE SUPPLY: Performed by: INTERNAL MEDICINE

## 2024-06-12 PROCEDURE — 6360000002 HC RX W HCPCS: Performed by: NURSE ANESTHETIST, CERTIFIED REGISTERED

## 2024-06-12 RX ORDER — LIDOCAINE HYDROCHLORIDE 20 MG/ML
INJECTION, SOLUTION EPIDURAL; INFILTRATION; INTRACAUDAL; PERINEURAL PRN
Status: DISCONTINUED | OUTPATIENT
Start: 2024-06-12 | End: 2024-06-12 | Stop reason: SDUPTHER

## 2024-06-12 RX ORDER — SODIUM CHLORIDE 9 MG/ML
INJECTION, SOLUTION INTRAVENOUS PRN
Status: DISCONTINUED | OUTPATIENT
Start: 2024-06-12 | End: 2024-06-12 | Stop reason: HOSPADM

## 2024-06-12 RX ORDER — SODIUM CHLORIDE 9 MG/ML
INJECTION, SOLUTION INTRAVENOUS CONTINUOUS PRN
Status: DISCONTINUED | OUTPATIENT
Start: 2024-06-12 | End: 2024-06-12 | Stop reason: SDUPTHER

## 2024-06-12 RX ORDER — SODIUM CHLORIDE 0.9 % (FLUSH) 0.9 %
5-40 SYRINGE (ML) INJECTION PRN
Status: DISCONTINUED | OUTPATIENT
Start: 2024-06-12 | End: 2024-06-12 | Stop reason: HOSPADM

## 2024-06-12 RX ORDER — SODIUM CHLORIDE 0.9 % (FLUSH) 0.9 %
5-40 SYRINGE (ML) INJECTION EVERY 12 HOURS SCHEDULED
Status: DISCONTINUED | OUTPATIENT
Start: 2024-06-12 | End: 2024-06-12 | Stop reason: HOSPADM

## 2024-06-12 RX ORDER — PROPOFOL 10 MG/ML
INJECTION, EMULSION INTRAVENOUS PRN
Status: DISCONTINUED | OUTPATIENT
Start: 2024-06-12 | End: 2024-06-12 | Stop reason: SDUPTHER

## 2024-06-12 RX ADMIN — PROPOFOL 80 MG: 10 INJECTION, EMULSION INTRAVENOUS at 14:23

## 2024-06-12 RX ADMIN — PROPOFOL 100 MG: 10 INJECTION, EMULSION INTRAVENOUS at 14:14

## 2024-06-12 RX ADMIN — PROPOFOL 50 MG: 10 INJECTION, EMULSION INTRAVENOUS at 14:17

## 2024-06-12 RX ADMIN — SODIUM CHLORIDE: 9 INJECTION, SOLUTION INTRAVENOUS at 14:07

## 2024-06-12 RX ADMIN — PROPOFOL 100 MG: 10 INJECTION, EMULSION INTRAVENOUS at 14:11

## 2024-06-12 RX ADMIN — PROPOFOL 50 MG: 10 INJECTION, EMULSION INTRAVENOUS at 14:12

## 2024-06-12 RX ADMIN — SODIUM CHLORIDE: 9 INJECTION, SOLUTION INTRAVENOUS at 12:46

## 2024-06-12 RX ADMIN — PROPOFOL 40 MG: 10 INJECTION, EMULSION INTRAVENOUS at 14:27

## 2024-06-12 RX ADMIN — LIDOCAINE HYDROCHLORIDE 50 MG: 20 INJECTION, SOLUTION EPIDURAL; INFILTRATION; INTRACAUDAL; PERINEURAL at 14:11

## 2024-06-12 ASSESSMENT — PAIN - FUNCTIONAL ASSESSMENT
PAIN_FUNCTIONAL_ASSESSMENT: 0-10
PAIN_FUNCTIONAL_ASSESSMENT: NONE - DENIES PAIN

## 2024-06-12 NOTE — H&P
Pre-operative History and Physical    Patient: Sabina Rios  : 1965  Acct#:     Intended Procedure:  EGD and Flexible sigmoidoscopy.     HISTORY OF PRESENT ILLNESS:  The patient is a 58 y.o. female  who presents for/due to  early satiety, nausea, bloating, and left lower quadrant pain with constipation.       Past Medical History:        Diagnosis Date    Arthritis     B12 deficiency     Bilateral hearing loss     Diverticulitis 2018    Dysthymia     Family history of colon cancer     2 grandpa's and a sister.  Dr ridley aware    GERD (gastroesophageal reflux disease)     Hiatal hernia     HLD (hyperlipidemia)     Irritable bowel syndrome with constipation     sees dr ridley    Tinnitus      Past Surgical History:        Procedure Laterality Date    BREAST SURGERY  2006    saline    COLONOSCOPY  2018    [place]- repeat 10 years    ENDOMETRIAL ABLATION      x 2    TONSILLECTOMY      WRIST SURGERY Right     torn ligaments     Medications Prior to Admission:   No current facility-administered medications on file prior to encounter.     Current Outpatient Medications on File Prior to Encounter   Medication Sig Dispense Refill    ondansetron (ZOFRAN-ODT) 8 MG TBDP disintegrating tablet Place 1 tablet under the tongue every 8 hours as needed for Nausea or Vomiting      lubiprostone (AMITIZA) 8 MCG CAPS capsule Take 1 capsule by mouth 2 times daily (with meals) 180 capsule 3    denosumab (PROLIA) 60 MG/ML SOSY SC injection Inject 1 mL into the skin once for 1 dose 1 mL 0    dicyclomine (BENTYL) 10 MG capsule Take 1 capsule by mouth 4 times daily (before meals and nightly) for 7 days 28 capsule 0    Cyanocobalamin (B-12 COMPLIANCE INJECTION) 1000 MCG/ML KIT Inject 1,000 mcg as directed every 30 days 12 kit 2    buPROPion (WELLBUTRIN XL) 300 MG extended release tablet Take 1 tablet by mouth every morning 90 tablet 3        Allergies:  Opium, Hydrocodone-acetaminophen, and Flomax [tamsulosin

## 2024-06-12 NOTE — ANESTHESIA PRE PROCEDURE
Types: Cigarettes     Start date: 1989     Quit date: 2001     Years since quittin.4   • Smokeless tobacco: Never   Substance Use Topics   • Alcohol use: Yes     Alcohol/week: 5.0 standard drinks of alcohol     Types: 5 Glasses of wine per week                                Counseling given: Not Answered      Vital Signs (Current):   Vitals:    24 1223 24 1241   BP:  126/80   Pulse:  85   Resp:  16   Temp:  97.7 °F (36.5 °C)   TempSrc:  Temporal   SpO2:  98%   Weight: 63.5 kg (140 lb) 61.7 kg (136 lb)   Height: 1.626 m (5' 4\") 1.626 m (5' 4\")                                              BP Readings from Last 3 Encounters:   24 126/80   05/10/24 115/70   24 110/80       NPO Status: Time of last liquid consumption: 730                        Time of last solid consumption:                         Date of last liquid consumption: 24                        Date of last solid food consumption: 24    BMI:   Wt Readings from Last 3 Encounters:   24 61.7 kg (136 lb)   24 63.5 kg (140 lb)   24 63.5 kg (140 lb)     Body mass index is 23.34 kg/m².    CBC:   Lab Results   Component Value Date/Time    WBC 6.1 2022 07:04 AM    RBC 3.97 2022 07:04 AM    HGB 12.4 2022 07:04 AM    HCT 36.7 2022 07:04 AM    MCV 92.5 2022 07:04 AM    RDW 13.4 2022 07:04 AM     2022 07:04 AM       CMP:   Lab Results   Component Value Date/Time     2024 06:47 AM    K 4.2 2024 06:47 AM    K 3.5 2018 05:28 AM     2024 06:47 AM    CO2 28 2024 06:47 AM    BUN 14 2024 06:47 AM    CREATININE 0.8 2024 06:47 AM    GFRAA >60 2022 07:04 AM    GFRAA >60 2011 04:42 PM    AGRATIO 2.2 2024 06:47 AM    LABGLOM 85 2024 06:47 AM    GLUCOSE 67 2024 06:04 AM    PROT 6.9 2011 04:42 PM    CALCIUM 9.9 2024 06:47 AM    BILITOT 0.6 2024 06:47 AM    ALKPHOS  62 04/01/2024 06:47 AM    AST 17 04/01/2024 06:47 AM    ALT 16 04/01/2024 06:47 AM       POC Tests: No results for input(s): \"POCGLU\", \"POCNA\", \"POCK\", \"POCCL\", \"POCBUN\", \"POCHEMO\", \"POCHCT\" in the last 72 hours.    Coags: No results found for: \"PROTIME\", \"INR\", \"APTT\"    HCG (If Applicable):   Lab Results   Component Value Date    PREGTESTUR Negative 03/07/2018        ABGs: No results found for: \"PHART\", \"PO2ART\", \"CIZ9GWU\", \"JVS9QQD\", \"BEART\", \"S5DMBONJ\"     Type & Screen (If Applicable):  No results found for: \"LABABO\"    Drug/Infectious Status (If Applicable):  No results found for: \"HIV\", \"HEPCAB\"    COVID-19 Screening (If Applicable):   Lab Results   Component Value Date/Time    COVID19 Not Detected 09/16/2022 11:15 AM           Anesthesia Evaluation  Patient summary reviewed   no history of anesthetic complications:   Airway: Mallampati: I  TM distance: >3 FB   Neck ROM: full  Mouth opening: > = 3 FB   Dental: normal exam         Pulmonary:Negative Pulmonary ROS and normal exam  breath sounds clear to auscultation                             Cardiovascular:  Exercise tolerance: good (>4 METS)  (+) hyperlipidemia        Rhythm: regular  Rate: normal                    Neuro/Psych:   (+) depression/anxiety             GI/Hepatic/Renal:   (+) hiatal hernia, GERD:          Endo/Other:    (+) : arthritis: OA..                 Abdominal:             Vascular: negative vascular ROS.         Other Findings:       Anesthesia Plan      MAC     ASA 2     (She says she is very sensitive to narcotics and has had some issues with breathing in the past related to it. Will avoid.)  Induction: intravenous.      Anesthetic plan and risks discussed with patient.      Plan discussed with CRNA.                Fredy Vila MD   6/12/2024

## 2024-06-12 NOTE — OP NOTE
Endoscopy Note    Patient: Sabina Rios  : 1965  Acct#:     Procedure: Esophagogastroduodenoscopy with biopsy, esophageal dilation                       Flexible sigmoidoscopy    Date:  2024     Endoscopist:   Moshe Ramírez Jr, DO    Referring Physician:  Vera Vásquez APRN - NP    Indications: 58 y.o. female  who presents for/due to  early satiety, nausea, bloating, intermittent dysphagia, epigastric discomfort and sporadic left lower quadrant pain with constipation.       Previous Colonoscopy: YES  Date:    Greater than 3 years: YES    Postoperative Diagnosis:  1) There was a minimal type B Schatzki's ring noted at the GE junction (37 cm from the incisors).   This was emperically dilated with a 20mm CRE balloon x 30 seconds.  The post dilation appearance was satisfactory without deep tears or excessive bleeding. 2)  The esophagus was normal without evidence of esophagitis, Hussein's esophagus, strictures, rings, furrowing, masses, or nodules. 3) There was a small 1 cm sliding hiatal hernia noted. 4) Several 3-4 mm polyps were noted in the fundus and body of the stomach.  These were consistent with fundic gland polyps.  These were biopsied and not removed. 5) Mild erythema of the antrum.  Biopsies were obtained from the antrum and body of the stomach to rule out active gastritis and H.pylori gastritis.  6) The villous pattern appeared normal, but given symptoms, multiple small bowel biopsies were obtained to evaluate for celiac disease. 7) The ampulla was normal appearing.  8) There was no evidence of colitis or colon polyps noted from the distal rectum to the splenic flexure/distal transverse colon.   9) There was moderate left sided diverticulosis noted.  There were no strictures, stenosis, or signs of inflamed diverticulum noted.  10) The sigmoid colon was tortuous.  11) Moderate internal hemorrhoids were noted.           Anesthesia:  The patient was administered TIVA per

## 2024-06-12 NOTE — DISCHARGE INSTRUCTIONS
Physical Activity -  Ask your doctor when you will be able to return to work.   Do not drive, operate heavy machinery, or do activities that require coordination or balance for 24 hours.   Otherwise, return to your normal routine as soon as you are comfortable to do so, which is usually the next day after the procedure.   Medications - When taking medications, it's important to:   Take your medication as directed, not more, not less, not at a different time.   Do not stop taking them without consulting your healthcare provider.   Don't share them with anyone else.   Know what effects and side effects to expect, and report them to your healthcare provider.   If you are taking more than one drug, even if it is an over-the-counter medication, herb, or dietary supplement, be sure to check with a physician or pharmacist about drug interactions.   Plan ahead for refills so you don't run out.   Lifestyle Changes - The results of your colonoscopy will determine if any lifestyle changes are necessary.     Follow-up:  The doctor will usually give you a preliminary report after the medication wears off and you are more alert. The results from a biopsy can take as long as 1-2 weeks to be completed.   Schedule a follow-up appointment as directed by your doctor.   You should schedule a follow-up colonoscopy as recommended by your doctor.     Call Your Doctor If Any of the Following Occurs:  Bleeding from your rectum; notify your doctor if you pass a teaspoonful or more of blood   Black, tarry stools   Severe abdominal pain   Hard, swollen abdomen   Signs of infection, including fever or chills   Inability to pass gas or stool   Coughing, shortness of breath, chest pain, severe nausea or vomiting     In case of an emergency, call 911 immediately.

## 2024-06-17 NOTE — ANESTHESIA POSTPROCEDURE EVALUATION
Department of Anesthesiology  Postprocedure Note    Patient: Sabina Rios  MRN: [id number]  YOB: 1965  Date of evaluation: 6/17/2024    Procedure Summary       Date: 06/12/24 Room / Location: [place] [address] / [facility]    Anesthesia Start: 1407 Anesthesia Stop: 1434    Procedures:       ESOPHAGOGASTRODUODENOSCOPY BIOPSY      SIGMOIDOSCOPY DIAGNOSTIC FLEXIBLE      ESOPHAGOGASTRODUODENOSCOPY DILATION BALLOON Diagnosis:       Left lower quadrant pain      Nausea      (Left lower quadrant pain [R10.32])      (Nausea [R11.0])    Surgeons: Moshe Ramírez Jr., DO Responsible Provider: Fredy Vila MD    Anesthesia Type: MAC ASA Status: 2            Anesthesia Type: No value filed.    Stone Phase I: Stone Score: 10    Stone Phase II: Stone Score: 10    Anesthesia Post Evaluation    Patient location during evaluation: PACU  Patient participation: complete - patient participated  Level of consciousness: awake  Airway patency: patent  Nausea & Vomiting: no nausea and no vomiting  Cardiovascular status: blood pressure returned to baseline  Respiratory status: acceptable  Hydration status: stable  Comments: Vital signs stable  OK to discharge from Stage I post anesthesia care.  Care transferred from Anesthesiology department on discharge from perioperative area   Multimodal analgesia pain management approach  Pain management: satisfactory to patient    No notable events documented.

## 2024-07-09 ENCOUNTER — PATIENT MESSAGE (OUTPATIENT)
Dept: FAMILY MEDICINE CLINIC | Age: 59
End: 2024-07-09

## 2024-07-09 DIAGNOSIS — E53.8 VITAMIN B12 DEFICIENCY: ICD-10-CM

## 2024-07-09 RX ORDER — CYANOCOBALAMIN, ISOPROPYL ALCOHOL 1000MCG/ML
1000 KIT INJECTION
Qty: 12 KIT | Refills: 2 | Status: SHIPPED | OUTPATIENT
Start: 2024-07-09

## 2024-07-09 RX ORDER — CYANOCOBALAMIN, ISOPROPYL ALCOHOL 1000MCG/ML
1000 KIT INJECTION
Qty: 12 KIT | Refills: 2 | Status: SHIPPED | OUTPATIENT
Start: 2024-07-09 | End: 2024-07-09 | Stop reason: SDUPTHER

## 2024-07-09 NOTE — TELEPHONE ENCOUNTER
From: Sabina Rios  To: Vera Vásquez  Sent: 7/9/2024 10:30 AM EDT  Subject: B 12 Shots    [facility] just called and my insurance says I have to use [facility]. Can you send the script to [facility] please?    Thank you  Digna

## 2024-07-12 ENCOUNTER — TELEPHONE (OUTPATIENT)
Dept: FAMILY MEDICINE CLINIC | Age: 59
End: 2024-07-12

## 2024-07-12 NOTE — TELEPHONE ENCOUNTER
----- Message from Sabina Rios sent at 7/12/2024 10:23 AM EDT -----  Regarding: Wellbutrin/all medications  Contact: [phone]  Vera    can you send an order for refills on the medications to [facility]?  They still have my prescriptions under Dr Johnston and I need them under you.  They dont have refills on them except the B 12 you sent in.    Thank you  Digna

## 2024-07-15 DIAGNOSIS — F34.1 DYSTHYMIA: ICD-10-CM

## 2024-07-15 DIAGNOSIS — K58.1 IRRITABLE BOWEL SYNDROME WITH CONSTIPATION: ICD-10-CM

## 2024-07-15 RX ORDER — BUPROPION HYDROCHLORIDE 300 MG/1
300 TABLET ORAL EVERY MORNING
Qty: 90 TABLET | Refills: 3 | Status: SHIPPED | OUTPATIENT
Start: 2024-07-15

## 2024-07-15 RX ORDER — LUBIPROSTONE 8 UG/1
8 CAPSULE ORAL 2 TIMES DAILY WITH MEALS
Qty: 180 CAPSULE | Refills: 3 | Status: SHIPPED | OUTPATIENT
Start: 2024-07-15

## 2024-07-22 ENCOUNTER — APPOINTMENT (OUTPATIENT)
Dept: CT IMAGING | Age: 59
End: 2024-07-22
Payer: COMMERCIAL

## 2024-07-22 ENCOUNTER — TELEPHONE (OUTPATIENT)
Dept: FAMILY MEDICINE CLINIC | Age: 59
End: 2024-07-22

## 2024-07-22 ENCOUNTER — HOSPITAL ENCOUNTER (EMERGENCY)
Age: 59
Discharge: HOME | End: 2024-07-22
Attending: EMERGENCY MEDICINE
Payer: COMMERCIAL

## 2024-07-22 VITALS
DIASTOLIC BLOOD PRESSURE: 61 MMHG | SYSTOLIC BLOOD PRESSURE: 114 MMHG | HEART RATE: 77 BPM | OXYGEN SATURATION: 100 % | TEMPERATURE: 97.9 F | RESPIRATION RATE: 12 BRPM

## 2024-07-22 DIAGNOSIS — K57.92 ACUTE DIVERTICULITIS: Primary | ICD-10-CM

## 2024-07-22 LAB
ALBUMIN SERPL-MCNC: 4.3 G/DL (ref 3.4–5)
ALP SERPL-CCNC: 50 U/L (ref 40–129)
ALT SERPL-CCNC: 9 U/L (ref 10–40)
ANION GAP SERPL CALCULATED.3IONS-SCNC: 10 MMOL/L (ref 3–16)
AST SERPL-CCNC: 12 U/L (ref 15–37)
BACTERIA URNS QL MICRO: NORMAL /HPF
BASOPHILS # BLD: 0.1 K/UL (ref 0–0.2)
BASOPHILS NFR BLD: 1.5 %
BILIRUB DIRECT SERPL-MCNC: <0.2 MG/DL (ref 0–0.3)
BILIRUB INDIRECT SERPL-MCNC: ABNORMAL MG/DL (ref 0–1)
BILIRUB SERPL-MCNC: 0.3 MG/DL (ref 0–1)
BILIRUB UR QL STRIP.AUTO: NEGATIVE
BUN SERPL-MCNC: 6 MG/DL (ref 7–20)
CALCIUM SERPL-MCNC: 9.3 MG/DL (ref 8.3–10.6)
CHLORIDE SERPL-SCNC: 105 MMOL/L (ref 99–110)
CLARITY UR: CLEAR
CO2 SERPL-SCNC: 25 MMOL/L (ref 21–32)
COLOR UR: YELLOW
CREAT SERPL-MCNC: 0.6 MG/DL (ref 0.6–1.1)
DEPRECATED RDW RBC AUTO: 13.3 % (ref 12.4–15.4)
EKG ATRIAL RATE: 71 BPM
EKG DIAGNOSIS: NORMAL
EKG P AXIS: 65 DEGREES
EKG P-R INTERVAL: 166 MS
EKG Q-T INTERVAL: 408 MS
EKG QRS DURATION: 84 MS
EKG QTC CALCULATION (BAZETT): 443 MS
EKG R AXIS: 23 DEGREES
EKG T AXIS: 44 DEGREES
EKG VENTRICULAR RATE: 71 BPM
EOSINOPHIL # BLD: 0.1 K/UL (ref 0–0.6)
EOSINOPHIL NFR BLD: 2.4 %
EPI CELLS #/AREA URNS AUTO: 1 /HPF (ref 0–5)
GFR SERPLBLD CREATININE-BSD FMLA CKD-EPI: >90 ML/MIN/{1.73_M2}
GLUCOSE BLD-MCNC: 95 MG/DL (ref 70–99)
GLUCOSE SERPL-MCNC: 82 MG/DL (ref 70–99)
GLUCOSE UR STRIP.AUTO-MCNC: NEGATIVE MG/DL
HCT VFR BLD AUTO: 37.4 % (ref 36–48)
HGB BLD-MCNC: 12.6 G/DL (ref 12–16)
HGB UR QL STRIP.AUTO: NEGATIVE
HYALINE CASTS #/AREA URNS AUTO: 0 /LPF (ref 0–8)
KETONES UR STRIP.AUTO-MCNC: NEGATIVE MG/DL
LEUKOCYTE ESTERASE UR QL STRIP.AUTO: ABNORMAL
LIPASE SERPL-CCNC: 31 U/L (ref 13–60)
LYMPHOCYTES # BLD: 2 K/UL (ref 1–5.1)
LYMPHOCYTES NFR BLD: 34.6 %
MCH RBC QN AUTO: 31.6 PG (ref 26–34)
MCHC RBC AUTO-ENTMCNC: 33.7 G/DL (ref 31–36)
MCV RBC AUTO: 93.9 FL (ref 80–100)
MONOCYTES # BLD: 0.6 K/UL (ref 0–1.3)
MONOCYTES NFR BLD: 9.9 %
NEUTROPHILS # BLD: 3 K/UL (ref 1.7–7.7)
NEUTROPHILS NFR BLD: 51.6 %
NITRITE UR QL STRIP.AUTO: NEGATIVE
PERFORMED ON: NORMAL
PH UR STRIP.AUTO: 7.5 [PH] (ref 5–8)
PLATELET # BLD AUTO: 335 K/UL (ref 135–450)
PMV BLD AUTO: 8.2 FL (ref 5–10.5)
POTASSIUM SERPL-SCNC: 3.8 MMOL/L (ref 3.5–5.1)
PROT SERPL-MCNC: 7.1 G/DL (ref 6.4–8.2)
PROT UR STRIP.AUTO-MCNC: NEGATIVE MG/DL
RBC # BLD AUTO: 3.98 M/UL (ref 4–5.2)
RBC CLUMPS #/AREA URNS AUTO: 3 /HPF (ref 0–4)
SODIUM SERPL-SCNC: 140 MMOL/L (ref 136–145)
SP GR UR STRIP.AUTO: 1.01 (ref 1–1.03)
UA COMPLETE W REFLEX CULTURE PNL UR: ABNORMAL
UA DIPSTICK W REFLEX MICRO PNL UR: YES
URN SPEC COLLECT METH UR: ABNORMAL
UROBILINOGEN UR STRIP-ACNC: 1 E.U./DL
WBC # BLD AUTO: 5.9 K/UL (ref 4–11)
WBC #/AREA URNS AUTO: 1 /HPF (ref 0–5)

## 2024-07-22 PROCEDURE — 74177 CT ABD & PELVIS W/CONTRAST: CPT

## 2024-07-22 PROCEDURE — 2580000003 HC RX 258

## 2024-07-22 PROCEDURE — 93010 ELECTROCARDIOGRAM REPORT: CPT | Performed by: INTERNAL MEDICINE

## 2024-07-22 PROCEDURE — 6360000002 HC RX W HCPCS

## 2024-07-22 PROCEDURE — 80048 BASIC METABOLIC PNL TOTAL CA: CPT

## 2024-07-22 PROCEDURE — 96365 THER/PROPH/DIAG IV INF INIT: CPT

## 2024-07-22 PROCEDURE — 6360000004 HC RX CONTRAST MEDICATION

## 2024-07-22 PROCEDURE — 85025 COMPLETE CBC W/AUTO DIFF WBC: CPT

## 2024-07-22 PROCEDURE — 96375 TX/PRO/DX INJ NEW DRUG ADDON: CPT

## 2024-07-22 PROCEDURE — 83690 ASSAY OF LIPASE: CPT

## 2024-07-22 PROCEDURE — 96361 HYDRATE IV INFUSION ADD-ON: CPT

## 2024-07-22 PROCEDURE — 80076 HEPATIC FUNCTION PANEL: CPT

## 2024-07-22 PROCEDURE — 99285 EMERGENCY DEPT VISIT HI MDM: CPT

## 2024-07-22 PROCEDURE — 81001 URINALYSIS AUTO W/SCOPE: CPT

## 2024-07-22 PROCEDURE — 93005 ELECTROCARDIOGRAM TRACING: CPT | Performed by: EMERGENCY MEDICINE

## 2024-07-22 RX ORDER — AMOXICILLIN AND CLAVULANATE POTASSIUM 875; 125 MG/1; MG/1
1 TABLET, FILM COATED ORAL 3 TIMES DAILY
Qty: 21 TABLET | Refills: 0 | Status: SHIPPED | OUTPATIENT
Start: 2024-07-22 | End: 2024-07-29

## 2024-07-22 RX ORDER — SODIUM CHLORIDE, SODIUM LACTATE, POTASSIUM CHLORIDE, AND CALCIUM CHLORIDE .6; .31; .03; .02 G/100ML; G/100ML; G/100ML; G/100ML
1000 INJECTION, SOLUTION INTRAVENOUS ONCE
Status: COMPLETED | OUTPATIENT
Start: 2024-07-22 | End: 2024-07-22

## 2024-07-22 RX ORDER — KETOROLAC TROMETHAMINE 15 MG/ML
15 INJECTION, SOLUTION INTRAMUSCULAR; INTRAVENOUS ONCE
Status: COMPLETED | OUTPATIENT
Start: 2024-07-22 | End: 2024-07-22

## 2024-07-22 RX ORDER — ONDANSETRON 2 MG/ML
4 INJECTION INTRAMUSCULAR; INTRAVENOUS EVERY 30 MIN PRN
Status: DISCONTINUED | OUTPATIENT
Start: 2024-07-22 | End: 2024-07-22 | Stop reason: HOSPADM

## 2024-07-22 RX ORDER — PROMETHAZINE HYDROCHLORIDE 25 MG/1
25 SUPPOSITORY RECTAL EVERY 6 HOURS PRN
Qty: 7 SUPPOSITORY | Refills: 0 | Status: SHIPPED | OUTPATIENT
Start: 2024-07-22 | End: 2024-07-29

## 2024-07-22 RX ORDER — ONDANSETRON 4 MG/1
4 TABLET, FILM COATED ORAL EVERY 8 HOURS PRN
Qty: 20 TABLET | Refills: 0 | Status: SHIPPED | OUTPATIENT
Start: 2024-07-22

## 2024-07-22 RX ADMIN — ONDANSETRON 4 MG: 2 INJECTION INTRAMUSCULAR; INTRAVENOUS at 10:57

## 2024-07-22 RX ADMIN — KETOROLAC TROMETHAMINE 15 MG: 15 INJECTION, SOLUTION INTRAMUSCULAR; INTRAVENOUS at 10:57

## 2024-07-22 RX ADMIN — SODIUM CHLORIDE, POTASSIUM CHLORIDE, SODIUM LACTATE AND CALCIUM CHLORIDE 1000 ML: 600; 310; 30; 20 INJECTION, SOLUTION INTRAVENOUS at 11:07

## 2024-07-22 RX ADMIN — IOPAMIDOL 75 ML: 755 INJECTION, SOLUTION INTRAVENOUS at 11:41

## 2024-07-22 RX ADMIN — AMPICILLIN SODIUM AND SULBACTAM SODIUM 3000 MG: 2; 1 INJECTION, POWDER, FOR SOLUTION INTRAMUSCULAR; INTRAVENOUS at 12:57

## 2024-07-22 NOTE — ED PROVIDER NOTES
ANDREY EAST MD   [employer]        Andrey East MD  07/22/24 9090    
completed with a voice recognition program.  Efforts were made to edit the dictations but occasionally words are mis-transcribed.)    RONALD Bello CNP (electronically signed)        Jarrod Crocker APRN - CNP  07/22/24 0596

## 2024-07-22 NOTE — PROGRESS NOTES
Pt resting in bed at this time. Rates pain in LLQ 3/10. Ordered meds given (see MAR). Call light left within reach. Vital signs stable on room air.

## 2024-07-22 NOTE — PROGRESS NOTES
Discharge instructions reviewed with pt. All questions answered. PIV removed without complication. Pt discharged home in stable condition with .

## 2024-10-21 RX ORDER — DENOSUMAB 60 MG/ML
INJECTION SUBCUTANEOUS
Qty: 1 ML | Refills: 0 | OUTPATIENT
Start: 2024-10-21

## 2024-10-27 ENCOUNTER — HOSPITAL ENCOUNTER (INPATIENT)
Age: 59
LOS: 2 days | Discharge: HOME | DRG: 392 | End: 2024-10-29
Attending: EMERGENCY MEDICINE
Payer: COMMERCIAL

## 2024-10-27 ENCOUNTER — APPOINTMENT (OUTPATIENT)
Dept: CT IMAGING | Age: 59
DRG: 392 | End: 2024-10-27
Payer: COMMERCIAL

## 2024-10-27 DIAGNOSIS — K57.92 DIVERTICULITIS: Primary | ICD-10-CM

## 2024-10-27 LAB
ALBUMIN SERPL-MCNC: 4.4 G/DL (ref 3.4–5)
ALBUMIN/GLOB SERPL: 1.6 {RATIO} (ref 1.1–2.2)
ALP SERPL-CCNC: 82 U/L (ref 40–129)
ALT SERPL-CCNC: 9 U/L (ref 10–40)
ANION GAP SERPL CALCULATED.3IONS-SCNC: 12 MMOL/L (ref 3–16)
AST SERPL-CCNC: 15 U/L (ref 15–37)
BASOPHILS # BLD: 0.1 K/UL (ref 0–0.2)
BASOPHILS NFR BLD: 0.7 %
BILIRUB SERPL-MCNC: 0.9 MG/DL (ref 0–1)
BILIRUB UR QL STRIP.AUTO: NEGATIVE
BUN SERPL-MCNC: 10 MG/DL (ref 7–20)
CALCIUM SERPL-MCNC: 10 MG/DL (ref 8.3–10.6)
CHARACTER UR: ABNORMAL
CHLORIDE SERPL-SCNC: 100 MMOL/L (ref 99–110)
CLARITY UR: CLEAR
CO2 SERPL-SCNC: 26 MMOL/L (ref 21–32)
COLOR UR: YELLOW
CREAT SERPL-MCNC: 0.8 MG/DL (ref 0.6–1.1)
DEPRECATED RDW RBC AUTO: 13.4 % (ref 12.4–15.4)
EOSINOPHIL # BLD: 0.1 K/UL (ref 0–0.6)
EOSINOPHIL NFR BLD: 0.7 %
GFR SERPLBLD CREATININE-BSD FMLA CKD-EPI: 85 ML/MIN/{1.73_M2}
GLUCOSE SERPL-MCNC: 89 MG/DL (ref 70–99)
GLUCOSE UR STRIP.AUTO-MCNC: NEGATIVE MG/DL
HCT VFR BLD AUTO: 37.9 % (ref 36–48)
HGB BLD-MCNC: 12.7 G/DL (ref 12–16)
HGB UR QL STRIP.AUTO: ABNORMAL
KETONES UR STRIP.AUTO-MCNC: NEGATIVE MG/DL
LACTATE BLDV-SCNC: 0.6 MMOL/L (ref 0.4–1.9)
LACTATE BLDV-SCNC: 1.1 MMOL/L (ref 0.4–1.9)
LEUKOCYTE ESTERASE UR QL STRIP.AUTO: ABNORMAL
LIPASE SERPL-CCNC: 21 U/L (ref 13–60)
LYMPHOCYTES # BLD: 1.7 K/UL (ref 1–5.1)
LYMPHOCYTES NFR BLD: 13.6 %
MCH RBC QN AUTO: 31.3 PG (ref 26–34)
MCHC RBC AUTO-ENTMCNC: 33.6 G/DL (ref 31–36)
MCV RBC AUTO: 93.2 FL (ref 80–100)
MONOCYTES # BLD: 1.5 K/UL (ref 0–1.3)
MONOCYTES NFR BLD: 12 %
NEUTROPHILS # BLD: 9.2 K/UL (ref 1.7–7.7)
NEUTROPHILS NFR BLD: 73 %
NITRITE UR QL STRIP.AUTO: NEGATIVE
PH UR STRIP.AUTO: 8 [PH] (ref 5–8)
PLATELET # BLD AUTO: 340 K/UL (ref 135–450)
PMV BLD AUTO: 8.4 FL (ref 5–10.5)
POTASSIUM SERPL-SCNC: 3.9 MMOL/L (ref 3.5–5.1)
PROT SERPL-MCNC: 7.1 G/DL (ref 6.4–8.2)
PROT UR STRIP.AUTO-MCNC: NEGATIVE MG/DL
RBC # BLD AUTO: 4.07 M/UL (ref 4–5.2)
RBC #/AREA URNS HPF: ABNORMAL /HPF (ref 0–4)
SODIUM SERPL-SCNC: 138 MMOL/L (ref 136–145)
SP GR UR STRIP.AUTO: 1 (ref 1–1.03)
UA COMPLETE W REFLEX CULTURE PNL UR: ABNORMAL
UA DIPSTICK W REFLEX MICRO PNL UR: YES
URN SPEC COLLECT METH UR: ABNORMAL
UROBILINOGEN UR STRIP-ACNC: 0.2 E.U./DL
WBC # BLD AUTO: 12.6 K/UL (ref 4–11)
WBC #/AREA URNS HPF: ABNORMAL /HPF (ref 0–5)

## 2024-10-27 PROCEDURE — 6360000004 HC RX CONTRAST MEDICATION: Performed by: PHYSICIAN ASSISTANT

## 2024-10-27 PROCEDURE — 94760 N-INVAS EAR/PLS OXIMETRY 1: CPT

## 2024-10-27 PROCEDURE — 87040 BLOOD CULTURE FOR BACTERIA: CPT

## 2024-10-27 PROCEDURE — 80053 COMPREHEN METABOLIC PANEL: CPT

## 2024-10-27 PROCEDURE — 2580000003 HC RX 258: Performed by: PHYSICIAN ASSISTANT

## 2024-10-27 PROCEDURE — 74177 CT ABD & PELVIS W/CONTRAST: CPT

## 2024-10-27 PROCEDURE — 85025 COMPLETE CBC W/AUTO DIFF WBC: CPT

## 2024-10-27 PROCEDURE — 83605 ASSAY OF LACTIC ACID: CPT

## 2024-10-27 PROCEDURE — 36415 COLL VENOUS BLD VENIPUNCTURE: CPT

## 2024-10-27 PROCEDURE — 6360000002 HC RX W HCPCS: Performed by: PHYSICIAN ASSISTANT

## 2024-10-27 PROCEDURE — 6360000002 HC RX W HCPCS

## 2024-10-27 PROCEDURE — 96375 TX/PRO/DX INJ NEW DRUG ADDON: CPT

## 2024-10-27 PROCEDURE — 2580000003 HC RX 258

## 2024-10-27 PROCEDURE — 83690 ASSAY OF LIPASE: CPT

## 2024-10-27 PROCEDURE — 96374 THER/PROPH/DIAG INJ IV PUSH: CPT

## 2024-10-27 PROCEDURE — 81001 URINALYSIS AUTO W/SCOPE: CPT

## 2024-10-27 PROCEDURE — 1200000000 HC SEMI PRIVATE

## 2024-10-27 PROCEDURE — 99285 EMERGENCY DEPT VISIT HI MDM: CPT

## 2024-10-27 PROCEDURE — 6370000000 HC RX 637 (ALT 250 FOR IP)

## 2024-10-27 PROCEDURE — 2100000000 HC CCU R&B

## 2024-10-27 RX ORDER — ENOXAPARIN SODIUM 100 MG/ML
40 INJECTION SUBCUTANEOUS DAILY
Status: DISCONTINUED | OUTPATIENT
Start: 2024-10-27 | End: 2024-10-29 | Stop reason: HOSPADM

## 2024-10-27 RX ORDER — ONDANSETRON 2 MG/ML
4 INJECTION INTRAMUSCULAR; INTRAVENOUS EVERY 6 HOURS PRN
Status: DISCONTINUED | OUTPATIENT
Start: 2024-10-27 | End: 2024-10-29 | Stop reason: HOSPADM

## 2024-10-27 RX ORDER — POLYETHYLENE GLYCOL 3350 17 G/17G
17 POWDER, FOR SOLUTION ORAL DAILY PRN
Status: DISCONTINUED | OUTPATIENT
Start: 2024-10-27 | End: 2024-10-29 | Stop reason: HOSPADM

## 2024-10-27 RX ORDER — SODIUM CHLORIDE 0.9 % (FLUSH) 0.9 %
5-40 SYRINGE (ML) INJECTION PRN
Status: DISCONTINUED | OUTPATIENT
Start: 2024-10-27 | End: 2024-10-29 | Stop reason: HOSPADM

## 2024-10-27 RX ORDER — SODIUM CHLORIDE 0.9 % (FLUSH) 0.9 %
5-40 SYRINGE (ML) INJECTION EVERY 12 HOURS SCHEDULED
Status: DISCONTINUED | OUTPATIENT
Start: 2024-10-27 | End: 2024-10-29 | Stop reason: HOSPADM

## 2024-10-27 RX ORDER — POTASSIUM CHLORIDE 7.45 MG/ML
10 INJECTION INTRAVENOUS PRN
Status: ACTIVE | OUTPATIENT
Start: 2024-10-27 | End: 2024-10-28

## 2024-10-27 RX ORDER — SODIUM CHLORIDE 9 MG/ML
INJECTION, SOLUTION INTRAVENOUS CONTINUOUS
Status: DISCONTINUED | OUTPATIENT
Start: 2024-10-27 | End: 2024-10-29 | Stop reason: HOSPADM

## 2024-10-27 RX ORDER — CIPROFLOXACIN 2 MG/ML
200 INJECTION, SOLUTION INTRAVENOUS EVERY 12 HOURS
Status: DISCONTINUED | OUTPATIENT
Start: 2024-10-27 | End: 2024-10-29 | Stop reason: HOSPADM

## 2024-10-27 RX ORDER — KETOROLAC TROMETHAMINE 30 MG/ML
30 INJECTION, SOLUTION INTRAMUSCULAR; INTRAVENOUS EVERY 6 HOURS PRN
Status: DISCONTINUED | OUTPATIENT
Start: 2024-10-27 | End: 2024-10-29 | Stop reason: HOSPADM

## 2024-10-27 RX ORDER — CIPROFLOXACIN 2 MG/ML
200 INJECTION, SOLUTION INTRAVENOUS EVERY 12 HOURS
Status: DISCONTINUED | OUTPATIENT
Start: 2024-10-27 | End: 2024-10-27 | Stop reason: RX

## 2024-10-27 RX ORDER — KETOROLAC TROMETHAMINE 15 MG/ML
15 INJECTION, SOLUTION INTRAMUSCULAR; INTRAVENOUS ONCE
Status: COMPLETED | OUTPATIENT
Start: 2024-10-27 | End: 2024-10-27

## 2024-10-27 RX ORDER — ACETAMINOPHEN 325 MG/1
650 TABLET ORAL EVERY 6 HOURS PRN
Status: DISCONTINUED | OUTPATIENT
Start: 2024-10-27 | End: 2024-10-29 | Stop reason: HOSPADM

## 2024-10-27 RX ORDER — MAGNESIUM SULFATE IN WATER 40 MG/ML
2000 INJECTION, SOLUTION INTRAVENOUS PRN
Status: DISCONTINUED | OUTPATIENT
Start: 2024-10-27 | End: 2024-10-29 | Stop reason: HOSPADM

## 2024-10-27 RX ORDER — CIPROFLOXACIN 2 MG/ML
400 INJECTION, SOLUTION INTRAVENOUS EVERY 12 HOURS
Status: DISCONTINUED | OUTPATIENT
Start: 2024-10-27 | End: 2024-10-27

## 2024-10-27 RX ORDER — ONDANSETRON 4 MG/1
4 TABLET, ORALLY DISINTEGRATING ORAL EVERY 8 HOURS PRN
Status: DISCONTINUED | OUTPATIENT
Start: 2024-10-27 | End: 2024-10-29 | Stop reason: HOSPADM

## 2024-10-27 RX ORDER — METRONIDAZOLE 500 MG/100ML
500 INJECTION, SOLUTION INTRAVENOUS EVERY 8 HOURS
Status: DISCONTINUED | OUTPATIENT
Start: 2024-10-27 | End: 2024-10-29 | Stop reason: HOSPADM

## 2024-10-27 RX ORDER — SODIUM CHLORIDE 9 MG/ML
INJECTION, SOLUTION INTRAVENOUS PRN
Status: DISCONTINUED | OUTPATIENT
Start: 2024-10-27 | End: 2024-10-29 | Stop reason: HOSPADM

## 2024-10-27 RX ORDER — ONDANSETRON 2 MG/ML
4 INJECTION INTRAMUSCULAR; INTRAVENOUS ONCE
Status: COMPLETED | OUTPATIENT
Start: 2024-10-27 | End: 2024-10-27

## 2024-10-27 RX ORDER — POTASSIUM CHLORIDE 1500 MG/1
40 TABLET, EXTENDED RELEASE ORAL PRN
Status: ACTIVE | OUTPATIENT
Start: 2024-10-27 | End: 2024-10-28

## 2024-10-27 RX ORDER — IOPAMIDOL 755 MG/ML
75 INJECTION, SOLUTION INTRAVASCULAR
Status: COMPLETED | OUTPATIENT
Start: 2024-10-27 | End: 2024-10-27

## 2024-10-27 RX ORDER — METRONIDAZOLE 500 MG/100ML
500 INJECTION, SOLUTION INTRAVENOUS ONCE
Status: COMPLETED | OUTPATIENT
Start: 2024-10-27 | End: 2024-10-27

## 2024-10-27 RX ORDER — DICYCLOMINE HYDROCHLORIDE 10 MG/1
10 CAPSULE ORAL
Status: DISCONTINUED | OUTPATIENT
Start: 2024-10-27 | End: 2024-10-29 | Stop reason: HOSPADM

## 2024-10-27 RX ORDER — ACETAMINOPHEN 650 MG/1
650 SUPPOSITORY RECTAL EVERY 6 HOURS PRN
Status: DISCONTINUED | OUTPATIENT
Start: 2024-10-27 | End: 2024-10-29 | Stop reason: HOSPADM

## 2024-10-27 RX ADMIN — CIPROFLOXACIN 200 MG: 2 INJECTION, SOLUTION INTRAVENOUS at 13:47

## 2024-10-27 RX ADMIN — KETOROLAC TROMETHAMINE 30 MG: 30 INJECTION, SOLUTION INTRAMUSCULAR at 21:01

## 2024-10-27 RX ADMIN — DICYCLOMINE HYDROCHLORIDE 10 MG: 10 CAPSULE ORAL at 17:00

## 2024-10-27 RX ADMIN — SODIUM CHLORIDE: 9 INJECTION, SOLUTION INTRAVENOUS at 18:25

## 2024-10-27 RX ADMIN — METRONIDAZOLE 500 MG: 500 INJECTION, SOLUTION INTRAVENOUS at 18:08

## 2024-10-27 RX ADMIN — WATER 1000 MG: 1 INJECTION INTRAMUSCULAR; INTRAVENOUS; SUBCUTANEOUS at 09:45

## 2024-10-27 RX ADMIN — IOPAMIDOL 75 ML: 755 INJECTION, SOLUTION INTRAVENOUS at 08:38

## 2024-10-27 RX ADMIN — ONDANSETRON 4 MG: 2 INJECTION INTRAMUSCULAR; INTRAVENOUS at 07:44

## 2024-10-27 RX ADMIN — SODIUM CHLORIDE: 9 INJECTION, SOLUTION INTRAVENOUS at 08:05

## 2024-10-27 RX ADMIN — CIPROFLOXACIN 200 MG: 2 INJECTION, SOLUTION INTRAVENOUS at 23:34

## 2024-10-27 RX ADMIN — SODIUM CHLORIDE, PRESERVATIVE FREE 10 ML: 5 INJECTION INTRAVENOUS at 21:01

## 2024-10-27 RX ADMIN — SODIUM CHLORIDE: 9 INJECTION, SOLUTION INTRAVENOUS at 12:19

## 2024-10-27 RX ADMIN — DICYCLOMINE HYDROCHLORIDE 10 MG: 10 CAPSULE ORAL at 21:01

## 2024-10-27 RX ADMIN — METRONIDAZOLE 500 MG: 500 INJECTION, SOLUTION INTRAVENOUS at 09:45

## 2024-10-27 RX ADMIN — KETOROLAC TROMETHAMINE 30 MG: 30 INJECTION, SOLUTION INTRAMUSCULAR at 12:24

## 2024-10-27 RX ADMIN — CIPROFLOXACIN 200 MG: 2 INJECTION, SOLUTION INTRAVENOUS at 12:28

## 2024-10-27 RX ADMIN — KETOROLAC TROMETHAMINE 15 MG: 15 INJECTION, SOLUTION INTRAMUSCULAR; INTRAVENOUS at 07:44

## 2024-10-27 ASSESSMENT — PAIN DESCRIPTION - PAIN TYPE
TYPE: ACUTE PAIN

## 2024-10-27 ASSESSMENT — PAIN DESCRIPTION - LOCATION
LOCATION: ABDOMEN

## 2024-10-27 ASSESSMENT — PAIN SCALES - GENERAL
PAINLEVEL_OUTOF10: 0
PAINLEVEL_OUTOF10: 5
PAINLEVEL_OUTOF10: 6
PAINLEVEL_OUTOF10: 8
PAINLEVEL_OUTOF10: 10
PAINLEVEL_OUTOF10: 8
PAINLEVEL_OUTOF10: 5
PAINLEVEL_OUTOF10: 6

## 2024-10-27 ASSESSMENT — PAIN DESCRIPTION - DESCRIPTORS
DESCRIPTORS: SPASM
DESCRIPTORS: CRAMPING;SHARP
DESCRIPTORS: CRAMPING;SHARP
DESCRIPTORS: CRAMPING
DESCRIPTORS: CRAMPING;SPASM

## 2024-10-27 ASSESSMENT — PAIN - FUNCTIONAL ASSESSMENT
PAIN_FUNCTIONAL_ASSESSMENT: PREVENTS OR INTERFERES SOME ACTIVE ACTIVITIES AND ADLS
PAIN_FUNCTIONAL_ASSESSMENT: 0-10
PAIN_FUNCTIONAL_ASSESSMENT: PREVENTS OR INTERFERES SOME ACTIVE ACTIVITIES AND ADLS
PAIN_FUNCTIONAL_ASSESSMENT: ACTIVITIES ARE NOT PREVENTED

## 2024-10-27 ASSESSMENT — PAIN DESCRIPTION - ORIENTATION
ORIENTATION: RIGHT;LEFT;MID;LOWER
ORIENTATION: LEFT;RIGHT;LOWER
ORIENTATION: RIGHT;LEFT
ORIENTATION: LEFT;RIGHT;LOWER
ORIENTATION: RIGHT;LEFT;LOWER
ORIENTATION: RIGHT;LEFT;OTHER (COMMENT)

## 2024-10-27 ASSESSMENT — PAIN DESCRIPTION - ONSET
ONSET: PROGRESSIVE
ONSET: ON-GOING
ONSET: ON-GOING

## 2024-10-27 ASSESSMENT — PAIN DESCRIPTION - FREQUENCY
FREQUENCY: INTERMITTENT
FREQUENCY: INTERMITTENT
FREQUENCY: CONTINUOUS

## 2024-10-27 NOTE — ED PROVIDER NOTES
ED Attending Attestation Note    This patient was seen by the advanced practice provider.     I personally saw the patient. Management plan and care decisions have been discussed with me and I made/approved the management plan and take responsibility for patient management.     Briefly, 59 y.o. female presents with abdominal pain that started on Friday and has been worsening since then.  Patient has a history of recurrent diverticulitis and symptoms are somewhat similar but seem to be more located in the pelvis.  Reports nausea without emesis.  Reports subjective fever.    Focused exam:   Gen: 59 y.o. female, NAD, nontoxic appearing  HEENT: NCAT. MMM, PERRL. EOMI.   CV: RRR w/o MRG  Vascular: intact and symmetric radial and DP pulses bilaterally  Lungs: CTAB. No incr WOB.   Abdomen: Soft, diffuse abdominal tenderness most pronounced in the lower abdomen, right lower quadrant greater than left lower quadrant, some voluntary guarding, nondistended.  Normal bowel  Neuro: awake and alert, speech clear w/o aphasia; intact and symmetric strength and sensation in all 4 extremities, CN 2-12 intact bilaterally    MDM:   This is a 59-year-old woman presenting with abdominal pain in the context of recurrent diverticulitis.  Upon presentation, she was nontoxic-appearing and in no acute distress but did have very significant abdominal tenderness in her lower abdomen.  Differential diagnosis included diverticulitis, appendicitis, UTI or other acute intra-abdominal pathology.   Laboratory assessment and CT of the abdomen and pelvis obtained.  Patient has a leukocytosis of 12.6.  CT abdomen and pelvis showed thickening of sigmoid colon with adjacent fat stranding and a small amount of free pelvic fluid suggestive of diverticulitis.  Given her significant tenderness, we will manage with IV antibiotics and admission.  She was given Rocephin and Flagyl.  Discussed with GI.     Nursing notes, vital signs reviewed.       For further

## 2024-10-27 NOTE — ED PROVIDER NOTES
[facility]  EMERGENCY DEPARTMENT ENCOUNTER        Pt Name: Sabina Rios  MRN: [id number]  Birthdate 1965  Date of evaluation: 10/27/2024  Provider: KRISTYN Beck  PCP: Vera Vásquez APRN - NP  Note Started: 7:07 AM EDT 10/27/24       I have seen and evaluated this patient with my supervising physician Antoinette Gonzalez MD.      CHIEF COMPLAINT       Chief Complaint   Patient presents with    Abdominal Pain     Began late Friday across lower ABD        HISTORY OF PRESENT ILLNESS: 1 or more Elements     History from : Patient    Limitations to history : None    Sabina Rios is a 59 y.o. female  with Pmhx diverticulitis who presents from home for abd pain. It started Friday. She notes lower abd pain that is constant, intermittently sharp. She also notes pelvic pressure which she has never had. Denies fevers. No melena or hematochezia.     Nursing Notes were all reviewed and agreed with or any disagreements were addressed in the HPI.    REVIEW OF SYSTEMS :      Review of Systems    Positives and Pertinent negatives as per HPI.     SURGICAL HISTORY     Past Surgical History:   Procedure Laterality Date    BREAST SURGERY  2006    saline    COLONOSCOPY  03/07/2018    [place]- repeat 10 years    ENDOMETRIAL ABLATION      x 2    SIGMOIDOSCOPY N/A 6/12/2024    SIGMOIDOSCOPY DIAGNOSTIC FLEXIBLE performed by Moshe Ramírez Jr. DO at [facility] ENDOSCOPY    TONSILLECTOMY      UPPER GASTROINTESTINAL ENDOSCOPY N/A 6/12/2024    ESOPHAGOGASTRODUODENOSCOPY BIOPSY performed by Moshe Ramírez Jr. DO at [facility] ENDOSCOPY    UPPER GASTROINTESTINAL ENDOSCOPY N/A 6/12/2024    ESOPHAGOGASTRODUODENOSCOPY DILATION BALLOON performed by Moshe Ramírez Jr., DO at [facility] ENDOSCOPY    WRIST SURGERY Right 2008    torn ligaments       CURRENTMEDICATIONS       Current Discharge Medication List        CONTINUE these medications which have NOT CHANGED    Details   ondansetron (ZOFRAN) 4 MG tablet

## 2024-10-27 NOTE — PLAN OF CARE
Problem: Pain  Goal: Verbalizes/displays adequate comfort level or baseline comfort level  Outcome: Progressing  Flowsheets (Taken 10/27/2024 1102)  Verbalizes/displays adequate comfort level or baseline comfort level:   Encourage patient to monitor pain and request assistance   Assess pain using appropriate pain scale   Administer analgesics based on type and severity of pain and evaluate response   Implement non-pharmacological measures as appropriate and evaluate response   Consider cultural and social influences on pain and pain management   Notify Licensed Independent Practitioner if interventions unsuccessful or patient reports new pain

## 2024-10-27 NOTE — H&P
V2.0  History and Physical      Name:  Sabina Rios /Age/Sex: 1965  (59 y.o. female)   MRN & CSN:  [id number] & [id number] Encounter Date/Time: 10/27/2024 12:48 PM EDT   Location:  [address]- PCP: Vera Vásquez APRN - NP       Hospital Day: 1    Assessment and Plan:   Sabina Rios is a 59 y.o. female  who presents with Acute diverticulitis    Hospital Problems             Last Modified POA    * (Principal) Acute diverticulitis 10/27/2024 Yes       Plan:  Recurrent acute diverticulitis : As seen on imaging with no complications. Had similar episodes in the past which required admissions. Will start on IV antibiotics. GI following, plan for outpatient vs inpatient surgical consult for any intervention  Abdominal pain : Due to above. Started on toradol PRN and home bentyl  DVT prophylaxis : Lovenox  History of osteoarthritis : Currently well controlled  GERD : Not requiring any medications currently     Disposition:     Diet Diet NPO   DVT Prophylaxis [] Lovenox, []  Heparin, [] SCDs, [] Ambulation,  [] Eliquis, [] Xarelto, [] Coumadin   Code Status Full Code         Personally reviewed Lab Studies and Imaging     Discussed management of the case with ED who recommended admission    Imaging that was interpreted personally includes CT abdomen and results diverticulities        History from:     patient    History of Present Illness:     Chief Complaint:   Sabina Rios is a 59 y.o. female with pmh of osteoarthritis, diverticulitis who presents with chief complaints of abdominal pain that has been going on since Friday and has been getting worse. Complains of having some nausea but no vomiting. Reports having history of diverticulitis but this episode appears worse than before. Denies any active fever, chills, chest pain, shortness of breath      Review of Systems:        Pertinent positives and negatives discussed in HPI     Objective:     Intake/Output Summary (Last 24 hours) at

## 2024-10-27 NOTE — ED TRIAGE NOTES
Pt came to ED due to severe lower ABD pain that started late Friday evening. Pt sts ahe believes its a diverticulitus flare up but is more painful than any other flare up she has experienced.

## 2024-10-27 NOTE — CONSULTS
GI Consult Note    Patient: Sabina Rios  : 1965  Acct#:      Date:  10/27/2024    Subjective:       History of Present Illness  Patient is a 59 y.o. female admitted with No admission diagnoses are documented for this encounter. who is seen in consult for acute diverticulitis.  59-year-old with a history of hyperlipidemia, GERD, IBS with constipation, arthritis, B12 deficiency, tonsillectomy.  She had a upper endoscopy and colonoscopy with Dr. Ramírez 2024 for early satiety, nausea, bloating, intermittent dysphagia, epigastric discomfort, and sporadic left lower quadrant discomfort with constipation.  This showed a Schatzki ring dilated to 20 mm, a 1 cm sliding hiatal hernia, multiple fundic gland polyps, mild gastritis with negative biopsies for Helicobacter pylori, normal small bowel with negative biopsies for celiac disease, moderate left-sided diverticulosis, tortuous sigmoid colon, moderate internal hemorrhoids.  She presents now with abdominal pain.  The pain is across her lower abdomen.  It is more severe than her prior episodes.  It is an achy discomfort.  No associated fevers although she says her temperature typically runs low.  She has had decreased appetite.  She has had a couple of days of pain.  Course is worsening.  Aggravated by movement.  Alleviated with Toradol.  She does not feel well.  Lab evaluation showed a normal renal panel, normal LFTs, CBC with a white blood count 12.6, hemoglobin 12.7, platelets 340.  CT of the abdomen pelvis with contrast showed mural thickening of the sigmoid colon with adjacent fat stranding and small amount of free pelvic fluid suggestive of diverticulitis.  CAT scan 2024 showed acute diverticulitis of the distal descending and proximal sigmoid colon.  CAT scan 2024 showed diverticulosis only.  CAT scan 2023 was negative.  CAT scan  showed acute distal descending colonic diverticulitis.      Past Medical History:   Diagnosis Date

## 2024-10-28 ENCOUNTER — TELEPHONE (OUTPATIENT)
Dept: ADMINISTRATIVE | Age: 59
End: 2024-10-28

## 2024-10-28 LAB
ANION GAP SERPL CALCULATED.3IONS-SCNC: 9 MMOL/L (ref 3–16)
BASOPHILS # BLD: 0 K/UL (ref 0–0.2)
BASOPHILS NFR BLD: 0.5 %
BUN SERPL-MCNC: 10 MG/DL (ref 7–20)
CALCIUM SERPL-MCNC: 7.6 MG/DL (ref 8.3–10.6)
CHLORIDE SERPL-SCNC: 107 MMOL/L (ref 99–110)
CO2 SERPL-SCNC: 23 MMOL/L (ref 21–32)
CREAT SERPL-MCNC: 0.8 MG/DL (ref 0.6–1.1)
DEPRECATED RDW RBC AUTO: 13.1 % (ref 12.4–15.4)
EOSINOPHIL # BLD: 0.2 K/UL (ref 0–0.6)
EOSINOPHIL NFR BLD: 2.4 %
GFR SERPLBLD CREATININE-BSD FMLA CKD-EPI: 85 ML/MIN/{1.73_M2}
GLUCOSE SERPL-MCNC: 82 MG/DL (ref 70–99)
HCT VFR BLD AUTO: 30.9 % (ref 36–48)
HGB BLD-MCNC: 10.6 G/DL (ref 12–16)
LYMPHOCYTES # BLD: 1.5 K/UL (ref 1–5.1)
LYMPHOCYTES NFR BLD: 18.5 %
MCH RBC QN AUTO: 32 PG (ref 26–34)
MCHC RBC AUTO-ENTMCNC: 34.3 G/DL (ref 31–36)
MCV RBC AUTO: 93.2 FL (ref 80–100)
MONOCYTES # BLD: 0.9 K/UL (ref 0–1.3)
MONOCYTES NFR BLD: 11.6 %
NEUTROPHILS # BLD: 5.4 K/UL (ref 1.7–7.7)
NEUTROPHILS NFR BLD: 67 %
PLATELET # BLD AUTO: 244 K/UL (ref 135–450)
PMV BLD AUTO: 8.1 FL (ref 5–10.5)
POTASSIUM SERPL-SCNC: 3.6 MMOL/L (ref 3.5–5.1)
RBC # BLD AUTO: 3.32 M/UL (ref 4–5.2)
SODIUM SERPL-SCNC: 139 MMOL/L (ref 136–145)
WBC # BLD AUTO: 8 K/UL (ref 4–11)

## 2024-10-28 PROCEDURE — 94760 N-INVAS EAR/PLS OXIMETRY 1: CPT

## 2024-10-28 PROCEDURE — 2100000000 HC CCU R&B

## 2024-10-28 PROCEDURE — 2580000003 HC RX 258

## 2024-10-28 PROCEDURE — 6360000002 HC RX W HCPCS

## 2024-10-28 PROCEDURE — 1200000000 HC SEMI PRIVATE

## 2024-10-28 PROCEDURE — 80048 BASIC METABOLIC PNL TOTAL CA: CPT

## 2024-10-28 PROCEDURE — 36415 COLL VENOUS BLD VENIPUNCTURE: CPT

## 2024-10-28 PROCEDURE — 85025 COMPLETE CBC W/AUTO DIFF WBC: CPT

## 2024-10-28 PROCEDURE — 2580000003 HC RX 258: Performed by: PHYSICIAN ASSISTANT

## 2024-10-28 PROCEDURE — 6370000000 HC RX 637 (ALT 250 FOR IP)

## 2024-10-28 RX ORDER — BUPROPION HYDROCHLORIDE 150 MG/1
300 TABLET ORAL EVERY MORNING
Status: DISCONTINUED | OUTPATIENT
Start: 2024-10-28 | End: 2024-10-29 | Stop reason: HOSPADM

## 2024-10-28 RX ADMIN — CIPROFLOXACIN 200 MG: 2 INJECTION, SOLUTION INTRAVENOUS at 23:57

## 2024-10-28 RX ADMIN — METRONIDAZOLE 500 MG: 500 INJECTION, SOLUTION INTRAVENOUS at 02:14

## 2024-10-28 RX ADMIN — DICYCLOMINE HYDROCHLORIDE 10 MG: 10 CAPSULE ORAL at 20:59

## 2024-10-28 RX ADMIN — CIPROFLOXACIN 200 MG: 2 INJECTION, SOLUTION INTRAVENOUS at 01:10

## 2024-10-28 RX ADMIN — ENOXAPARIN SODIUM 40 MG: 100 INJECTION SUBCUTANEOUS at 09:35

## 2024-10-28 RX ADMIN — SODIUM CHLORIDE, PRESERVATIVE FREE 10 ML: 5 INJECTION INTRAVENOUS at 06:34

## 2024-10-28 RX ADMIN — SODIUM CHLORIDE: 9 INJECTION, SOLUTION INTRAVENOUS at 04:28

## 2024-10-28 RX ADMIN — ONDANSETRON 4 MG: 2 INJECTION INTRAMUSCULAR; INTRAVENOUS at 11:22

## 2024-10-28 RX ADMIN — SODIUM CHLORIDE: 9 INJECTION, SOLUTION INTRAVENOUS at 14:25

## 2024-10-28 RX ADMIN — METRONIDAZOLE 500 MG: 500 INJECTION, SOLUTION INTRAVENOUS at 09:35

## 2024-10-28 RX ADMIN — DICYCLOMINE HYDROCHLORIDE 10 MG: 10 CAPSULE ORAL at 16:50

## 2024-10-28 RX ADMIN — DICYCLOMINE HYDROCHLORIDE 10 MG: 10 CAPSULE ORAL at 06:34

## 2024-10-28 RX ADMIN — BUPROPION HYDROCHLORIDE 300 MG: 150 TABLET, EXTENDED RELEASE ORAL at 12:37

## 2024-10-28 RX ADMIN — CIPROFLOXACIN 200 MG: 2 INJECTION, SOLUTION INTRAVENOUS at 13:15

## 2024-10-28 RX ADMIN — CIPROFLOXACIN 200 MG: 2 INJECTION, SOLUTION INTRAVENOUS at 12:33

## 2024-10-28 RX ADMIN — DICYCLOMINE HYDROCHLORIDE 10 MG: 10 CAPSULE ORAL at 12:37

## 2024-10-28 RX ADMIN — SODIUM CHLORIDE: 9 INJECTION, SOLUTION INTRAVENOUS at 23:53

## 2024-10-28 RX ADMIN — CIPROFLOXACIN 200 MG: 2 INJECTION, SOLUTION INTRAVENOUS at 23:56

## 2024-10-28 RX ADMIN — KETOROLAC TROMETHAMINE 30 MG: 30 INJECTION, SOLUTION INTRAMUSCULAR at 06:33

## 2024-10-28 RX ADMIN — METRONIDAZOLE 500 MG: 500 INJECTION, SOLUTION INTRAVENOUS at 18:09

## 2024-10-28 ASSESSMENT — PAIN SCALES - GENERAL
PAINLEVEL_OUTOF10: 5
PAINLEVEL_OUTOF10: 4
PAINLEVEL_OUTOF10: 6
PAINLEVEL_OUTOF10: 5
PAINLEVEL_OUTOF10: 7
PAINLEVEL_OUTOF10: 4
PAINLEVEL_OUTOF10: 4
PAINLEVEL_OUTOF10: 6
PAINLEVEL_OUTOF10: 4
PAINLEVEL_OUTOF10: 4

## 2024-10-28 ASSESSMENT — PAIN DESCRIPTION - PAIN TYPE
TYPE: ACUTE PAIN

## 2024-10-28 ASSESSMENT — PAIN DESCRIPTION - ORIENTATION
ORIENTATION: LOWER
ORIENTATION: MID
ORIENTATION: MID
ORIENTATION: RIGHT;LEFT;OTHER (COMMENT)
ORIENTATION: RIGHT;LEFT;OTHER (COMMENT)
ORIENTATION: LOWER
ORIENTATION: MID
ORIENTATION: RIGHT;LEFT;MID

## 2024-10-28 ASSESSMENT — PAIN DESCRIPTION - LOCATION
LOCATION: ABDOMEN

## 2024-10-28 ASSESSMENT — PAIN DESCRIPTION - DESCRIPTORS
DESCRIPTORS: CRAMPING

## 2024-10-28 ASSESSMENT — PAIN - FUNCTIONAL ASSESSMENT
PAIN_FUNCTIONAL_ASSESSMENT: ACTIVITIES ARE NOT PREVENTED

## 2024-10-28 ASSESSMENT — PAIN DESCRIPTION - FREQUENCY
FREQUENCY: INTERMITTENT

## 2024-10-28 ASSESSMENT — PAIN DESCRIPTION - ONSET: ONSET: PROGRESSIVE

## 2024-10-28 NOTE — CARE COORDINATION
Chart Reviewed.  Has pcp, Has insurance, from home.  Following for DC needs.  CHA Hernandez     Case Management   [phone]    10/28/2024  9:11 AM

## 2024-10-28 NOTE — TELEPHONE ENCOUNTER
There was a PA received VIA clinic, but there is not a current RX on file for Prolia.    If you want PA please submit new RX, and resend this PA request back to the pool    If this requires a response please respond to the pool ( P MHCX [facility] MEDICATION PRE-AUTH).      Thank you please advise patient.

## 2024-10-28 NOTE — PLAN OF CARE
Problem: Pain  Goal: Verbalizes/displays adequate comfort level or baseline comfort level  Outcome: Progressing  Verbalizes/displays adequate comfort level or baseline comfort level:   Encourage patient to monitor pain and request assistance   Assess pain using appropriate pain scale   Administer analgesics based on type and severity of pain and evaluate response   Implement non-pharmacological measures as appropriate and evaluate response   Consider cultural and social influences on pain and pain management   Notify Licensed Independent Practitioner if interventions unsuccessful or patient reports new pain     Problem: Gastrointestinal - Adult  Goal: Minimal or absence of nausea and vomiting  Outcome: Progressing  Minimal or absence of nausea and vomiting:   Administer IV fluids as ordered to ensure adequate hydration   Administer ordered antiemetic medications as needed   Provide nonpharmacologic comfort measures as appropriate     Problem: Gastrointestinal - Adult  Goal: Maintains or returns to baseline bowel function  Outcome: Progressing  Maintains or returns to baseline bowel function:   Assess bowel function   Encourage oral fluids to ensure adequate hydration   Administer IV fluids as ordered to ensure adequate hydration   Administer ordered medications as needed   Encourage mobilization and activity     Problem: Infection - Adult  Goal: Absence of infection at discharge  Outcome: Progressing  Absence of infection at discharge:   Assess and monitor for signs and symptoms of infection   Monitor lab/diagnostic results   Monitor all insertion sites i.e., indwelling lines, tubes and drains   Administer medications as ordered   Instruct and encourage patient and family to use good hand hygiene technique    Electronically signed by Alisha Thibodeaux RN on 10/28/2024 at 3:42 AM

## 2024-10-29 VITALS
OXYGEN SATURATION: 96 % | RESPIRATION RATE: 14 BRPM | TEMPERATURE: 98.2 F | SYSTOLIC BLOOD PRESSURE: 115 MMHG | HEIGHT: 64 IN | DIASTOLIC BLOOD PRESSURE: 46 MMHG | BODY MASS INDEX: 24.46 KG/M2 | WEIGHT: 143.3 LBS | HEART RATE: 69 BPM

## 2024-10-29 LAB
ANION GAP SERPL CALCULATED.3IONS-SCNC: 8 MMOL/L (ref 3–16)
BASOPHILS # BLD: 0.1 K/UL (ref 0–0.2)
BASOPHILS NFR BLD: 0.9 %
BUN SERPL-MCNC: 13 MG/DL (ref 7–20)
CALCIUM SERPL-MCNC: 7.8 MG/DL (ref 8.3–10.6)
CHLORIDE SERPL-SCNC: 111 MMOL/L (ref 99–110)
CO2 SERPL-SCNC: 23 MMOL/L (ref 21–32)
CREAT SERPL-MCNC: 0.6 MG/DL (ref 0.6–1.1)
DEPRECATED RDW RBC AUTO: 13.1 % (ref 12.4–15.4)
EOSINOPHIL # BLD: 0.2 K/UL (ref 0–0.6)
EOSINOPHIL NFR BLD: 3.4 %
GFR SERPLBLD CREATININE-BSD FMLA CKD-EPI: >90 ML/MIN/{1.73_M2}
GLUCOSE SERPL-MCNC: 121 MG/DL (ref 70–99)
HCT VFR BLD AUTO: 28.9 % (ref 36–48)
HGB BLD-MCNC: 9.7 G/DL (ref 12–16)
LYMPHOCYTES # BLD: 1.2 K/UL (ref 1–5.1)
LYMPHOCYTES NFR BLD: 20.1 %
MCH RBC QN AUTO: 31.4 PG (ref 26–34)
MCHC RBC AUTO-ENTMCNC: 33.6 G/DL (ref 31–36)
MCV RBC AUTO: 93.3 FL (ref 80–100)
MONOCYTES # BLD: 0.6 K/UL (ref 0–1.3)
MONOCYTES NFR BLD: 10.3 %
NEUTROPHILS # BLD: 4 K/UL (ref 1.7–7.7)
NEUTROPHILS NFR BLD: 65.3 %
PLATELET # BLD AUTO: 259 K/UL (ref 135–450)
PMV BLD AUTO: 8.1 FL (ref 5–10.5)
POTASSIUM SERPL-SCNC: 3.9 MMOL/L (ref 3.5–5.1)
RBC # BLD AUTO: 3.1 M/UL (ref 4–5.2)
SODIUM SERPL-SCNC: 142 MMOL/L (ref 136–145)
WBC # BLD AUTO: 6.1 K/UL (ref 4–11)

## 2024-10-29 PROCEDURE — 6370000000 HC RX 637 (ALT 250 FOR IP)

## 2024-10-29 PROCEDURE — 6360000002 HC RX W HCPCS

## 2024-10-29 PROCEDURE — 85025 COMPLETE CBC W/AUTO DIFF WBC: CPT

## 2024-10-29 PROCEDURE — 36415 COLL VENOUS BLD VENIPUNCTURE: CPT

## 2024-10-29 PROCEDURE — 94760 N-INVAS EAR/PLS OXIMETRY 1: CPT

## 2024-10-29 PROCEDURE — 80048 BASIC METABOLIC PNL TOTAL CA: CPT

## 2024-10-29 RX ORDER — METRONIDAZOLE 500 MG/1
500 TABLET ORAL 3 TIMES DAILY
Qty: 30 TABLET | Refills: 0 | Status: SHIPPED | OUTPATIENT
Start: 2024-10-29 | End: 2024-11-08

## 2024-10-29 RX ORDER — CIPROFLOXACIN 500 MG/1
500 TABLET, FILM COATED ORAL 2 TIMES DAILY
Qty: 20 TABLET | Refills: 0 | Status: SHIPPED | OUTPATIENT
Start: 2024-10-29 | End: 2024-11-08

## 2024-10-29 RX ADMIN — METRONIDAZOLE 500 MG: 500 INJECTION, SOLUTION INTRAVENOUS at 10:28

## 2024-10-29 RX ADMIN — CIPROFLOXACIN 200 MG: 2 INJECTION, SOLUTION INTRAVENOUS at 11:55

## 2024-10-29 RX ADMIN — DICYCLOMINE HYDROCHLORIDE 10 MG: 10 CAPSULE ORAL at 10:38

## 2024-10-29 RX ADMIN — ONDANSETRON 4 MG: 2 INJECTION INTRAMUSCULAR; INTRAVENOUS at 01:02

## 2024-10-29 RX ADMIN — BUPROPION HYDROCHLORIDE 300 MG: 150 TABLET, EXTENDED RELEASE ORAL at 08:34

## 2024-10-29 RX ADMIN — METRONIDAZOLE 500 MG: 500 INJECTION, SOLUTION INTRAVENOUS at 02:01

## 2024-10-29 RX ADMIN — ONDANSETRON 4 MG: 2 INJECTION INTRAMUSCULAR; INTRAVENOUS at 11:46

## 2024-10-29 RX ADMIN — ENOXAPARIN SODIUM 40 MG: 100 INJECTION SUBCUTANEOUS at 08:34

## 2024-10-29 RX ADMIN — CIPROFLOXACIN 200 MG: 2 INJECTION, SOLUTION INTRAVENOUS at 11:56

## 2024-10-29 RX ADMIN — DICYCLOMINE HYDROCHLORIDE 10 MG: 10 CAPSULE ORAL at 07:34

## 2024-10-29 ASSESSMENT — PAIN DESCRIPTION - LOCATION
LOCATION: ABDOMEN

## 2024-10-29 ASSESSMENT — PAIN DESCRIPTION - ORIENTATION: ORIENTATION: LOWER

## 2024-10-29 ASSESSMENT — PAIN SCALES - GENERAL
PAINLEVEL_OUTOF10: 4
PAINLEVEL_OUTOF10: 4
PAINLEVEL_OUTOF10: 2

## 2024-10-29 ASSESSMENT — PAIN DESCRIPTION - DESCRIPTORS
DESCRIPTORS: CRAMPING
DESCRIPTORS: ACHING

## 2024-10-29 ASSESSMENT — PAIN DESCRIPTION - FREQUENCY: FREQUENCY: INTERMITTENT

## 2024-10-29 ASSESSMENT — PAIN DESCRIPTION - PAIN TYPE: TYPE: ACUTE PAIN

## 2024-10-29 ASSESSMENT — PAIN - FUNCTIONAL ASSESSMENT: PAIN_FUNCTIONAL_ASSESSMENT: ACTIVITIES ARE NOT PREVENTED

## 2024-10-29 NOTE — DISCHARGE INSTR - COC
Continuity of Care Form    Patient Name: Sabina Rios   :  1965  MRN:  [id number]    Admit date:  10/27/2024  Discharge date:  10/29/2024    Code Status Order: Full Code   Advance Directives:   Advance Care Flowsheet Documentation             Admitting Physician:  Avril Livingston MD  PCP: Vera Vásquez APRN - NP    Discharging Nurse: Brandee SCHWARZ  Discharging Hospital Unit/Room#: C1B-0355/1305-01  Discharging Unit Phone Number: [phone]    Emergency Contact:   Extended Emergency Contact Information  Primary Emergency Contact: Moses Rios  Address:  [place] [address] [facility]  Home Phone: [phone]  Mobile Phone: [phone]  Relation: Spouse   needed? No  Secondary Emergency Contact: Barbie Hernández  Address: [address] [place] [address] [facility]  Home Phone: [phone]  Relation: Child   needed? No    Past Surgical History:  Past Surgical History:   Procedure Laterality Date    BREAST SURGERY  2006    saline    COLONOSCOPY  2018    Darrell- repeat 10 years    ENDOMETRIAL ABLATION      x 2    SIGMOIDOSCOPY N/A 2024    SIGMOIDOSCOPY DIAGNOSTIC FLEXIBLE performed by Moshe Ramírez Jr., DO at [facility] ENDOSCOPY    TONSILLECTOMY      UPPER GASTROINTESTINAL ENDOSCOPY N/A 2024    ESOPHAGOGASTRODUODENOSCOPY BIOPSY performed by Moshe Ramírez Jr., DO at [facility] ENDOSCOPY    UPPER GASTROINTESTINAL ENDOSCOPY N/A 2024    ESOPHAGOGASTRODUODENOSCOPY DILATION BALLOON performed by Moshe Ramírez Jr., DO at [facility] ENDOSCOPY    WRIST SURGERY Right 2008    torn ligaments       Immunization History:   Immunization History   Administered Date(s) Administered    COVID-19, MODERNA Bivalent, (age 12y+), IM, 50 mcg/0.5 mL 10/26/2022    COVID-19, PFIZER PURPLE top, DILUTE for use, (age 12 y+), 30mcg/0.3mL 2020, 2021, 10/13/2021, 10/31/2021    Influenza Virus

## 2024-10-29 NOTE — PLAN OF CARE
Problem: Pain  Goal: Verbalizes/displays adequate comfort level or baseline comfort level  Outcome: Progressing     Problem: Gastrointestinal - Adult  Goal: Minimal or absence of nausea and vomiting  Outcome: Progressing  Flowsheets (Taken 10/29/2024 0800)  Minimal or absence of nausea and vomiting:   Administer IV fluids as ordered to ensure adequate hydration   Maintain NPO status until nausea and vomiting are resolved  Goal: Maintains or returns to baseline bowel function  Outcome: Progressing  Flowsheets (Taken 10/29/2024 0800)  Maintains or returns to baseline bowel function:   Assess bowel function   Encourage oral fluids to ensure adequate hydration   Administer IV fluids as ordered to ensure adequate hydration     Problem: Infection - Adult  Goal: Absence of infection at discharge  Outcome: Progressing  Flowsheets (Taken 10/29/2024 0800)  Absence of infection at discharge:   Assess and monitor for signs and symptoms of infection   Monitor lab/diagnostic results   Monitor all insertion sites i.e., indwelling lines, tubes and drains     Problem: ABCDS Injury Assessment  Goal: Absence of physical injury  Outcome: Progressing

## 2024-10-29 NOTE — PLAN OF CARE
Problem: Pain  Goal: Verbalizes/displays adequate comfort level or baseline comfort level  Outcome: Progressing  Flowsheets (Taken 10/28/2024 0800 by Janette Benavides, RN)  Verbalizes/displays adequate comfort level or baseline comfort level: Encourage patient to monitor pain and request assistance     Problem: Gastrointestinal - Adult  Goal: Minimal or absence of nausea and vomiting  Outcome: Progressing  Flowsheets (Taken 10/28/2024 0800 by Janette Benavides, RN)  Minimal or absence of nausea and vomiting: Administer IV fluids as ordered to ensure adequate hydration     Problem: ABCDS Injury Assessment  Goal: Absence of physical injury  Outcome: Progressing

## 2024-10-29 NOTE — PROGRESS NOTES
Gastroenterology Progress Note    Sabina Rios is a 59 y.o. female patient.  Hospitalization Day:1    SUBJECTIVE:  Saw patient this am on rounds.  Still with some lower quadrant discomfort.  No BM's.  No vomiting.   No fever.      ROS:  Gastrointestinal ROS: positive for - abdominal pain    Physical    VITALS:  /63   Pulse 71   Temp 98.1 °F (36.7 °C) (Oral)   Resp 18   Ht 1.626 m (5' 4\")   Wt 65 kg (143 lb 4.8 oz)   LMP 2017   SpO2 97%   BMI 24.60 kg/m²   TEMPERATURE:  Current - Temp: 98.1 °F (36.7 °C); Max - Temp  Av.1 °F (36.7 °C)  Min: 97.7 °F (36.5 °C)  Max: 98.7 °F (37.1 °C)    General:  Alert and oriented,  No apparent distress  Skin- without jaundice  Eyes: anicteric sclera  Cardiac: RRR, Nl s1s2, without murmurs  Lungs CTA Bilaterally, normal effort  Abdomen soft,LLQ TTP without G and R, no HSM, Bowel sounds normal  Ext: without edema  Neuro: no asterixis     Data    Data Review:    Recent Labs     10/27/24  0750 10/28/24  0331   WBC 12.6* 8.0   HGB 12.7 10.6*   HCT 37.9 30.9*   MCV 93.2 93.2    244     Recent Labs     10/27/24  0750 10/28/24  0331    139   K 3.9 3.6    107   CO2 26 23   BUN 10 10   CREATININE 0.8 0.8     Recent Labs     10/27/24  0750   AST 15   ALT 9*   BILITOT 0.9   ALKPHOS 82     Recent Labs     10/27/24  0750   LIPASE 21.0     No results for input(s): \"PROTIME\", \"INR\" in the last 72 hours.    Radiology Review:    CT ABDOMEN PELVIS W IV CONTRAST Additional Contrast? None   Final Result   Mural thickening of the sigmoid colon with adjacent fat stranding and small   amount of free pelvic fluid, suggestive of diverticulitis.  Follow-up to   resolution is recommended as colon carcinoma can have a similar CT imaging   appearance.      Trace pleural effusions.  Basilar atelectasis.      Small hiatal hernia.               ASSESSMENT: 59-year-old with a history of hyperlipidemia, GERD, IBS with constipation, arthritis, B12 deficiency, 
    Gastroenterology Progress Note    Sabina Rios is a 59 y.o. female patient.  Hospitalization Day:2    SUBJECTIVE:  Saw patient this am on rounds.  Still with some lower quadrant discomfort, but improved.     ROS:  Gastrointestinal ROS: positive for - abdominal pain    Physical    VITALS:  BP (!) 97/47   Pulse 66   Temp 98.1 °F (36.7 °C) (Oral)   Resp 14   Ht 1.626 m (5' 4\")   Wt 65 kg (143 lb 4.8 oz)   LMP 2017   SpO2 97%   BMI 24.60 kg/m²   TEMPERATURE:  Current - Temp: 98.1 °F (36.7 °C); Max - Temp  Av.1 °F (36.7 °C)  Min: 98 °F (36.7 °C)  Max: 98.3 °F (36.8 °C)    General:  Alert and oriented,  No apparent distress  Skin- without jaundice  Eyes: anicteric sclera  Cardiac: RRR, Nl s1s2, without murmurs  Lungs CTA Bilaterally, normal effort  Abdomen soft,LLQ TTP without G and R, no HSM, Bowel sounds normal  Ext: without edema  Neuro: no asterixis     Data    Data Review:    Recent Labs     10/27/24  0750 10/28/24  0331 10/29/24  0320   WBC 12.6* 8.0 6.1   HGB 12.7 10.6* 9.7*   HCT 37.9 30.9* 28.9*   MCV 93.2 93.2 93.3    244 259     Recent Labs     10/27/24  0750 10/28/24  0331 10/29/24  0320    139 142   K 3.9 3.6 3.9    107 111*   CO2 26 23 23   BUN 10 10 13   CREATININE 0.8 0.8 0.6     Recent Labs     10/27/24  0750   AST 15   ALT 9*   BILITOT 0.9   ALKPHOS 82     Recent Labs     10/27/24  0750   LIPASE 21.0     No results for input(s): \"PROTIME\", \"INR\" in the last 72 hours.    Radiology Review:    CT ABDOMEN PELVIS W IV CONTRAST Additional Contrast? None   Final Result   Mural thickening of the sigmoid colon with adjacent fat stranding and small   amount of free pelvic fluid, suggestive of diverticulitis.  Follow-up to   resolution is recommended as colon carcinoma can have a similar CT imaging   appearance.      Trace pleural effusions.  Basilar atelectasis.      Small hiatal hernia.               ASSESSMENT: 59-year-old with a history of hyperlipidemia, GERD, IBS 
4 Eyes Skin Assessment     NAME:  Sabina Rios  YOB: 1965  MEDICAL RECORD NUMBER:  [id number]    The patient is being assessed for  Admission    I agree that at least one RN has performed a thorough Head to Toe Skin Assessment on the patient. ALL assessment sites listed below have been assessed.      Areas assessed by both nurses:    Head, Face, Ears, Shoulders, Back, Chest, Arms, Elbows, Hands, Sacrum. Buttock, Coccyx, Ischium, Legs. Feet and Heels, and Under Medical Devices         Does the Patient have a Wound? No noted wound(s)       Sonny Prevention initiated by RN: No  Wound Care Orders initiated by RN: No    Pressure Injury (Stage 3,4, Unstageable, DTI, NWPT, and Complex wounds) if present, place Wound referral order by RN under : No    New Ostomies, if present place, Ostomy referral order under : No     Nurse 1 eSignature: Electronically signed by Trina Bray RN on 10/27/24 at 12:35 PM EDT    **SHARE this note so that the co-signing nurse can place an eSignature**    Nurse 2 eSignature: Electronically signed by Corina Cornejo RN on 10/27/24 at 7:42 PM EDT   
Medication Reconciliation    List of medications patient is currently taking is complete.     Source of information: 1. Conversation with patient at bedside                                      2. EPIC records                  Pharm    
NAME:  Sabina Rios  YOB: 1965  MEDICAL RECORD NUMBER:  [id number]    Shift Summary: Pain improved overnight and pt did not require any prn pain meds. Zofran x1 overnight.     Family updated: No    Rhythm: Normal Sinus Rhythm     Most recent vitals:   Visit Vitals  BP (!) 97/47   Pulse 66   Temp 98.1 °F (36.7 °C) (Oral)   Resp 14   Ht 1.626 m (5' 4\")   Wt 65 kg (143 lb 4.8 oz)   SpO2 97%   BMI 24.60 kg/m²           No data found.    No data found.      Respiratory support needed (if any):  - RA    Admission weight Weight - Scale: 64.8 kg (142 lb 13.7 oz) (10/27/24 0650)    Today's weight    Wt Readings from Last 1 Encounters:   10/28/24 65 kg (143 lb 4.8 oz)        Scott need assessed each shift: N/A - no scott present  UOP >30ml/hr: YES  Last documented BM (in last 48 hrs):  Patient Vitals for the past 48 hrs:   Last BM (including prior to admit) Stool Occurrence   10/27/24 2050 10/25/24 --   10/27/24 2200 -- 0   10/28/24 0600 -- 0   10/28/24 0800 10/25/24 --   10/28/24 2000 10/25/24 --                Restraints (in use currently or dc'd in last 12 hrs): No    Order current and documentation up to date? No    Lines/Drains reviewed @ bedside.  Peripheral IV 10/27/24 Posterior;Right Forearm (Active)   Number of days: 1         Drip rates at handoff:    sodium chloride 100 mL/hr at 10/29/24 0600    sodium chloride Stopped (10/28/24 0519)       Lab Data:   CBC:   Recent Labs     10/28/24  0331 10/29/24  0320   WBC 8.0 6.1   HGB 10.6* 9.7*   HCT 30.9* 28.9*   MCV 93.2 93.3    259     BMP:    Recent Labs     10/28/24  0331 10/29/24  0320    142   K 3.6 3.9   CO2 23 23   BUN 10 13   CREATININE 0.8 0.6     LIVR:   Recent Labs     10/27/24  0750   AST 15   ALT 9*     PT/INR: No results for input(s): \"INR\" in the last 72 hours.    Invalid input(s): \"PROT\"  APTT: No results for input(s): \"APTT\" in the last 72 hours.  ABG: No results for input(s): \"PHART\", \"PGP4VKP\", \"PO2ART\" in the last 72 
NAME:  Sabina Rios  YOB: 1965  MEDICAL RECORD NUMBER:  [id number]    Shift Summary: She had an uneventful night. Her vitals had been stable & she hasn't been in any distress. She required her PRN IV Toradol twice overnight for her intermittent lower abdominal cramping pain, which had been aggravated by movement.    Family updated: No, she updates her family.    Rhythm: Normal Sinus Rhythm w/ rare PVCs    Most recent vitals:   Visit Vitals  /71   Pulse 71   Temp 98.7 °F (37.1 °C) (Oral)   Resp 17   Ht 1.626 m (5' 4\")   Wt 65 kg (143 lb 4.8 oz)   SpO2 98%   BMI 24.60 kg/m²           Respiratory support needed (if any):  - RA    Admission weight Weight - Scale: 64.8 kg (142 lb 13.7 oz) (10/27/24 0650)    Today's weight    Wt Readings from Last 1 Encounters:   10/28/24 65 kg (143 lb 4.8 oz)        Scott need assessed each shift: N/A - no scott present  UOP >30ml/hr: YES  Last documented BM (in last 48 hrs):  Patient Vitals for the past 48 hrs:   Last BM (including prior to admit) Stool Occurrence   10/27/24 2050 10/25/24 --   10/27/24 2200 -- 0   10/28/24 0600 -- 0                Restraints (in use currently or dc'd in last 12 hrs): No    Order current and documentation up to date? N/A    Lines/Drains reviewed @ bedside.  Peripheral IV 10/27/24 Posterior;Right Forearm (Active)   Number of days: 0         Drip rates at handoff:    sodium chloride 100 mL/hr at 10/28/24 0643    sodium chloride Stopped (10/28/24 0519)       Lab Data:   CBC:   Recent Labs     10/27/24  0750 10/28/24  0331   WBC 12.6* 8.0   HGB 12.7 10.6*   HCT 37.9 30.9*   MCV 93.2 93.2    244     BMP:    Recent Labs     10/27/24  0750 10/28/24  0331    139   K 3.9 3.6   CO2 26 23   BUN 10 10   CREATININE 0.8 0.8     LIVR:   Recent Labs     10/27/24  0750   AST 15   ALT 9*     PT/INR: No results for input(s): \"INR\" in the last 72 hours.    Invalid input(s): \"PROT\"  APTT: No results for input(s): \"APTT\" in the last 72 
NAME:  Sabina Rios  YOB: 1965  MEDICAL RECORD NUMBER:  [id number]    Shift Summary: Will need surgery outpatient in a couple weeks. Some nausea today, but was able to tolerate eating some of all her meals.     Family updated: Yes:  at bedside    Rhythm: Normal Sinus Rhythm     Most recent vitals:   Visit Vitals  BP (!) 111/42   Pulse 79   Temp 98.1 °F (36.7 °C) (Oral)   Resp 16   Ht 1.626 m (5' 4\")   Wt 65 kg (143 lb 4.8 oz)   SpO2 97%   BMI 24.60 kg/m²           No data found.    No data found.      Respiratory support needed (if any):  - RA    Admission weight Weight - Scale: 64.8 kg (142 lb 13.7 oz) (10/27/24 0650)    Today's weight    Wt Readings from Last 1 Encounters:   10/28/24 65 kg (143 lb 4.8 oz)        Scott need assessed each shift: N/A - no scott present  UOP >30ml/hr: YES  Last documented BM (in last 48 hrs):  Patient Vitals for the past 48 hrs:   Last BM (including prior to admit) Stool Occurrence   10/27/24 2050 10/25/24 --   10/27/24 2200 -- 0   10/28/24 0600 -- 0   10/28/24 0800 10/25/24 --                Restraints (in use currently or dc'd in last 12 hrs): No    Order current and documentation up to date? No    Lines/Drains reviewed @ bedside.  Peripheral IV 10/27/24 Posterior;Right Forearm (Active)   Number of days: 1         Drip rates at handoff:    sodium chloride 100 mL/hr at 10/28/24 1425    sodium chloride Stopped (10/28/24 0519)       Lab Data:   CBC:   Recent Labs     10/27/24  0750 10/28/24  0331   WBC 12.6* 8.0   HGB 12.7 10.6*   HCT 37.9 30.9*   MCV 93.2 93.2    244     BMP:    Recent Labs     10/27/24  0750 10/28/24  0331    139   K 3.9 3.6   CO2 26 23   BUN 10 10   CREATININE 0.8 0.8     LIVR:   Recent Labs     10/27/24  0750   AST 15   ALT 9*     PT/INR: No results for input(s): \"INR\" in the last 72 hours.    Invalid input(s): \"PROT\"  APTT: No results for input(s): \"APTT\" in the last 72 hours.  ABG: No results for input(s): \"PHART\", \"[id number]\", 
NAME:  Sabina Rios  YOB: 1965  MEDICAL RECORD NUMBER:  [id number]    Shift Summary: Patient admitted to CVU for acute diverticulitis and IV antibiotics . C/o lower, right, and left abdominal pain. PRN Toradol given.  VSS.     Family updated: Yes:  Moses Rios-  @ bedside     Rhythm: Normal Sinus Rhythm     Most recent vitals:   Visit Vitals  /60   Pulse 78   Temp 97.7 °F (36.5 °C) (Oral)   Resp 16   Ht 1.626 m (5' 4\")   Wt 64.8 kg (142 lb 13.7 oz)   SpO2 97%   BMI 24.52 kg/m²         Respiratory support needed (if any):  - RA    Admission weight Weight - Scale: 64.8 kg (142 lb 13.7 oz) (10/27/24 0650)    Today's weight    Wt Readings from Last 1 Encounters:   10/27/24 64.8 kg (142 lb 13.7 oz)        Scott need assessed each shift: N/A - no scott present  UOP >30ml/hr: YES  Last documented BM (in last 48 hrs):  No data found.             Restraints (in use currently or dc'd in last 12 hrs): No    Order current and documentation up to date? N/A    Lines/Drains reviewed @ bedside.  Peripheral IV 10/27/24 Posterior;Right Forearm (Active)   Number of days: 0         Drip rates at handoff:    sodium chloride 100 mL/hr at 10/27/24 1825    sodium chloride 5 mL/hr at 10/27/24 1526       Lab Data:   CBC:   Recent Labs     10/27/24  0750   WBC 12.6*   HGB 12.7   HCT 37.9   MCV 93.2        BMP:    Recent Labs     10/27/24  0750      K 3.9   CO2 26   BUN 10   CREATININE 0.8     LIVR:   Recent Labs     10/27/24  0750   AST 15   ALT 9*     PT/INR: No results for input(s): \"INR\" in the last 72 hours.    Invalid input(s): \"PROT\"  APTT: No results for input(s): \"APTT\" in the last 72 hours.  ABG: No results for input(s): \"PHART\", \"ISG8BQF\", \"PO2ART\" in the last 72 hours.    Any consults during the shift? GI     Any signed and held orders to be released?  No        4 Eyes Skin Assessment       The patient is being assessed for  Shift Handoff    I agree that at least one RN has performed 
ductal dilatation.  Gallbladder is grossly unremarkable.  Spleen is within normal limits.  Stomach is decompressed.  No pancreatic ductal dilatation or stranding.  Adrenal glands are within normal limits.  No hydronephrosis.  Abdominal aorta is within normal limits in caliber. GI/Bowel: Diverticulosis is present.  Mural thickening is seen of the sigmoid colon where diverticula are present in the posterior pelvis.  Mild induration of adjacent fat.  Possible appendix inferior to the cecum containing air though is obscured.  Small bowel is nondilated. Pelvis: Small amount of free pelvic fluid.  Uterus is present.  Bladder is grossly unremarkable.  No inguinal adenopathy. Peritoneum/Retroperitoneum: No free air. Bones/Soft Tissues: Degenerative change of the lumbar spine.     Mural thickening of the sigmoid colon with adjacent fat stranding and small amount of free pelvic fluid, suggestive of diverticulitis.  Follow-up to resolution is recommended as colon carcinoma can have a similar CT imaging appearance. Trace pleural effusions.  Basilar atelectasis. Small hiatal hernia.       CBC:   Recent Labs     10/27/24  0750 10/28/24  0331   WBC 12.6* 8.0   HGB 12.7 10.6*    244     BMP:    Recent Labs     10/27/24  0750 10/28/24  0331    139   K 3.9 3.6    107   CO2 26 23   BUN 10 10   CREATININE 0.8 0.8   GLUCOSE 89 82     Hepatic:   Recent Labs     10/27/24  0750   AST 15   ALT 9*   BILITOT 0.9   ALKPHOS 82     Lipids:   Lab Results   Component Value Date/Time    CHOL 250 05/31/2024 06:04 AM    HDL 92 05/31/2024 06:04 AM    HDL 77 01/18/2011 04:42 PM    TRIG 88 05/31/2024 06:04 AM     Hemoglobin A1C: No results found for: \"LABA1C\"  TSH:   Lab Results   Component Value Date/Time    TSH 2.73 01/18/2011 04:42 PM     Troponin: No results found for: \"TROPONINT\"  Lactic Acid: No results for input(s): \"LACTA\" in the last 72 hours.  BNP: No results for input(s): \"PROBNP\" in the last 72 hours.  UA:  Lab Results 
1 week history of urinary incontinence in addition to generalized weakness and dysarthria as noted above.  - please check bladder scan in the event that patient has issues with urinary retention and resultant overflow incontinence.  UA does not appear infected.   - no pathology on MRI in lumbar spine to explain incontinence  - outpatient follow up.

## 2024-10-29 NOTE — PLAN OF CARE
Problem: Pain  Goal: Verbalizes/displays adequate comfort level or baseline comfort level  10/29/2024 1227 by Brandee Mccrary RN  Outcome: Adequate for Discharge  10/29/2024 1226 by Brandee Mccrary RN  Outcome: Progressing     Problem: Gastrointestinal - Adult  Goal: Minimal or absence of nausea and vomiting  10/29/2024 1227 by Brandee Mccrary RN  Outcome: Adequate for Discharge  10/29/2024 1226 by Brandee Mccrary RN  Outcome: Progressing  Flowsheets (Taken 10/29/2024 0800)  Minimal or absence of nausea and vomiting:   Administer IV fluids as ordered to ensure adequate hydration   Maintain NPO status until nausea and vomiting are resolved  Goal: Maintains or returns to baseline bowel function  10/29/2024 1227 by Brandee Mccrary RN  Outcome: Adequate for Discharge  10/29/2024 1226 by Brandee Mccrary RN  Outcome: Progressing  Flowsheets (Taken 10/29/2024 0800)  Maintains or returns to baseline bowel function:   Assess bowel function   Encourage oral fluids to ensure adequate hydration   Administer IV fluids as ordered to ensure adequate hydration     Problem: Infection - Adult  Goal: Absence of infection at discharge  10/29/2024 1227 by Brandee Mccrary RN  Outcome: Adequate for Discharge  10/29/2024 1226 by Brandee Mccrary RN  Outcome: Progressing  Flowsheets (Taken 10/29/2024 0800)  Absence of infection at discharge:   Assess and monitor for signs and symptoms of infection   Monitor lab/diagnostic results   Monitor all insertion sites i.e., indwelling lines, tubes and drains     Problem: ABCDS Injury Assessment  Goal: Absence of physical injury  10/29/2024 1227 by Brandee Mccrary RN  Outcome: Adequate for Discharge  10/29/2024 1226 by Brandee Mccrary RN  Outcome: Progressing

## 2024-10-30 ENCOUNTER — CARE COORDINATION (OUTPATIENT)
Dept: OTHER | Facility: CLINIC | Age: 59
End: 2024-10-30

## 2024-10-30 NOTE — CARE COORDINATION
Care Transitions Note    Initial Call - Call within 2 business days of discharge: Yes    Outreach Attempts:   HIPAA compliant voicemail left for patient.     Patient: Sabina Rios    Patient : 1965   MRN: [id number]    Reason for Admission: Acute Diverticulitis  Discharge Date: 10/29/24  RURS: Readmission Risk Score: 6.5    Last Discharge Facility       Date Complaint Diagnosis Description Type Department Provider    10/27/24 Abdominal Pain Diverticulitis ED to Hosp-Admission (Discharged) (ADMITTED) [facility] Avril Whaley MD; Sa Lisa...            Was this an external facility discharge? No    Follow Up Appointment:   Patient has hospital follow up appointment scheduled  none per EMR         Plan for follow-up on next business day.      Abril Mayo RN

## 2024-10-31 ENCOUNTER — CARE COORDINATION (OUTPATIENT)
Dept: OTHER | Facility: CLINIC | Age: 59
End: 2024-10-31

## 2024-10-31 LAB — BACTERIA BLD CULT: NORMAL

## 2024-10-31 NOTE — CARE COORDINATION
Care Transitions Note    Initial Call - Call within 2 business days of discharge: Yes    Outreach Attempts:   Multiple attempts to contact patient at phone numbers on file.   HIPAA compliant voicemail left for patient.   Rabbit message sent.     Patient: Sabina Rios    Patient : 1965   MRN: [id number]    Reason for Admission: Acute diverticulitis  Discharge Date: 10/29/24  RURS: Readmission Risk Score: 6.5    Last Discharge Facility       Date Complaint Diagnosis Description Type Department Provider    10/27/24 Abdominal Pain Diverticulitis ED to Hosp-Admission (Discharged) (ADMITTED) [facility] Avril Whaley MD; Sa Lisa...            Was this an external facility discharge? No    Follow Up Appointment:   Patient has hospital follow up appointment scheduled  none per EMR         Plan for follow-up call in 6-10 days     Abril Mayo RN

## 2024-10-31 NOTE — DISCHARGE SUMMARY
Name:  Sabina Rios /Age/Sex: 1965 (59 y.o. female)   Admit Date: 10/27/2024  Discharge Date: 10/29/2024    MRN & CSN:  [id number] & [id number] Encounter Date : 10/29/2024    Attending:  No att. providers found Discharging Provider: Avril Livingston MD       Hospital Course:      Sabina Rios is a 59 y.o. female who presented with     Chief Complaint   Patient presents with    Abdominal Pain     Began late Friday across [facility]         The patient was being managed in the hospital for    Recurrent acute diverticulitis : As seen on imaging with no complications. Had similar episodes in the past which required admissions. GI was consulted while inpatient, was started on IV antibiotics while inpatient and was switched to oral antibiotics for 10 days and with follow up with Dr. Thayer to discuss surgical options  Abdominal pain : Due to above. Started on toradol PRN while in the hospital and home bentyl    The patient was stable on day of discharge and was advised to follow up with PCP in a week    The patient expressed appropriate understanding of, and agreement with the discharge recommendations, medications, and plan.     Consults this admission:  IP CONSULT TO GI    Discharge Diagnosis:   Acute diverticulitis    Discharge Instruction:   Follow up appointments: Primary care physician: Vera Vásquez APRN - NP within 1 week, Dr. Thayer in 1 week,  Activity: activity as tolerated  Condition on discharge: Stable    Discharge Medications:        Medication List        START taking these medications      ciprofloxacin 500 MG tablet  Commonly known as: CIPRO  Take 1 tablet by mouth 2 times daily for 10 days     metroNIDAZOLE 500 MG tablet  Commonly known as: FLAGYL  Take 1 tablet by mouth 3 times daily for 10 days            CONTINUE taking these medications      B-12 Compliance Injection 1000 MCG/ML Kit  Generic drug: Cyanocobalamin  Inject 1,000 mcg as directed every 30 days     buPROPion 300 MG

## 2024-11-01 RX ORDER — DENOSUMAB 60 MG/ML
60 INJECTION SUBCUTANEOUS ONCE
Qty: 1 ML | Refills: 0 | Status: SHIPPED | OUTPATIENT
Start: 2024-11-01 | End: 2024-11-01 | Stop reason: SDUPTHER

## 2024-11-01 RX ORDER — DENOSUMAB 60 MG/ML
60 INJECTION SUBCUTANEOUS ONCE
Qty: 1 ML | Refills: 0 | Status: SHIPPED | OUTPATIENT
Start: 2024-11-01 | End: 2024-11-01

## 2024-11-01 NOTE — TELEPHONE ENCOUNTER
Resent prolia through [facility]. Not sure why we are having difficulties. She should be able to give to herself at home, meaning she would not go to clinic for this. Should be sent to her house

## 2024-11-01 NOTE — TELEPHONE ENCOUNTER
What is confusing is it says  the same day it was written. So I am clarifying this is an active script?

## 2024-11-04 ENCOUNTER — PREP FOR PROCEDURE (OUTPATIENT)
Dept: SURGERY | Age: 59
End: 2024-11-04

## 2024-11-04 ENCOUNTER — TELEPHONE (OUTPATIENT)
Dept: SURGERY | Age: 59
End: 2024-11-04

## 2024-11-04 RX ORDER — SODIUM CHLORIDE 0.9 % (FLUSH) 0.9 %
5-40 SYRINGE (ML) INJECTION EVERY 12 HOURS SCHEDULED
Status: CANCELLED | OUTPATIENT
Start: 2024-11-04

## 2024-11-04 RX ORDER — SODIUM CHLORIDE 9 MG/ML
INJECTION, SOLUTION INTRAVENOUS PRN
Status: CANCELLED | OUTPATIENT
Start: 2024-11-04

## 2024-11-04 RX ORDER — SODIUM CHLORIDE 0.9 % (FLUSH) 0.9 %
5-40 SYRINGE (ML) INJECTION PRN
Status: CANCELLED | OUTPATIENT
Start: 2024-11-04

## 2024-11-05 ENCOUNTER — CARE COORDINATION (OUTPATIENT)
Dept: OTHER | Facility: CLINIC | Age: 59
End: 2024-11-05

## 2024-11-05 NOTE — CARE COORDINATION
Care Transitions Note    Initial Call - Call within 2 business days of discharge: Yes    Outreach Attempts:   Multiple attempts to contact patient at phone numbers on file.   HIPAA compliant voicemail left for patient.   MOLOMEt message sent.     Patient: Sabina Rios    Patient : 1965   MRN: [id number]    Reason for Admission: Acute diverticulitis  Discharge Date: 10/29/24  RURS: Readmission Risk Score: 6.5    Last Discharge Facility       Date Complaint Diagnosis Description Type Department Provider    10/27/24 Abdominal Pain Diverticulitis ED to Hosp-Admission (Discharged) (ADMITTED) SHEATZ Avril Whaley MD; Sa Lisa...            Was this an external facility discharge? No    Follow Up Appointment:   Patient has hospital follow up appointment scheduled  none noted per EMR         This ACM to sign off and close program.      Abril Mayo RN

## 2024-11-11 ENCOUNTER — TELEPHONE (OUTPATIENT)
Dept: VASCULAR SURGERY | Age: 59
End: 2024-11-11

## 2024-11-11 NOTE — TELEPHONE ENCOUNTER
PT calling to see if her [employer] paperwork has been completed and faxed to [employer] call and advise.

## 2024-11-14 NOTE — PROGRESS NOTES
[facility] PRE-OPERATIVE INSTRUCTIONS    Day of Procedure:   12/4             Arrival time:      920          Surgery time:1050    Take the following medications with a sip of water:  Follow your MD/Surgeons pre-procedure instructions regarding your medications     Do not eat or drink anything after 12:00 midnight prior to your surgery.  This includes water chewing gum, mints and ice chips.   You may brush your teeth and gargle the morning of your surgery, but do not swallow the water     Please see your family doctor/pediatrician for a history and physical and/or concerning medications.   Bring any test results/reports from your physicians office.   If you are under the care of a heart doctor or specialist doctor, please be aware that you may be asked to them for clearance    You may be asked to stop blood thinners such as Coumadin, Plavix, Fragmin, Lovenox, etc., or any anti-inflammatories such as:  Aspirin, Ibuprofen, Advil, Naproxen prior to your surgery.    We also ask that you stop any OTC medications such as fish oil, vitamin E, glucosamine, garlic, Multivitamins, COQ 10, etc.    We ask that you do not smoke 24 hours prior to surgery  We ask that you do not  drink any alcoholic beverages 24 hours prior to surgery     You must make arrangements for a responsible adult to take you home after your surgery.    For your safety you will not be allowed to leave alone or drive yourself home.  Your surgery will be cancelled if you do not have a ride home.     Also for your safety, you must have someone stay with you the first 24 hours after your surgery.     A parent or legal guardian must accompany a child scheduled for surgery and plan to stay at the hospital until the child is discharged.    Please do not bring other children with you.    For your comfort, please wear simple loose fitting clothing to the hospital.  Please do not bring valuables.    Do not wear any make-up or nail polish on your fingers

## 2024-11-18 ENCOUNTER — TELEPHONE (OUTPATIENT)
Dept: SURGERY | Age: 59
End: 2024-11-18

## 2024-11-18 NOTE — TELEPHONE ENCOUNTER
Left message on voicemail to notify that 12/4/24 surgery arrive time is now 1045a with a start time of 1215p.

## 2024-12-02 ENCOUNTER — ANESTHESIA EVENT (OUTPATIENT)
Dept: OPERATING ROOM | Age: 59
End: 2024-12-02
Payer: COMMERCIAL

## 2024-12-04 ENCOUNTER — ANESTHESIA (OUTPATIENT)
Dept: OPERATING ROOM | Age: 59
End: 2024-12-04
Payer: COMMERCIAL

## 2024-12-04 ENCOUNTER — HOSPITAL ENCOUNTER (INPATIENT)
Age: 59
LOS: 10 days | Discharge: HOME | End: 2024-12-14
Attending: SURGERY | Admitting: SURGERY
Payer: COMMERCIAL

## 2024-12-04 ENCOUNTER — APPOINTMENT (OUTPATIENT)
Dept: GENERAL RADIOLOGY | Age: 59
End: 2024-12-04
Attending: SURGERY
Payer: COMMERCIAL

## 2024-12-04 DIAGNOSIS — K57.92 DIVERTICULITIS: ICD-10-CM

## 2024-12-04 DIAGNOSIS — E53.8 VITAMIN B12 DEFICIENCY: ICD-10-CM

## 2024-12-04 PROBLEM — K57.32 DIVERTICULITIS LARGE INTESTINE W/O PERFORATION OR ABSCESS W/O BLEEDING: Status: ACTIVE | Noted: 2024-12-04

## 2024-12-04 PROCEDURE — 3600000004 HC SURGERY LEVEL 4 BASE: Performed by: SURGERY

## 2024-12-04 PROCEDURE — 6360000002 HC RX W HCPCS: Performed by: ANESTHESIOLOGY

## 2024-12-04 PROCEDURE — C9290 INJ, BUPIVACAINE LIPOSOME: HCPCS | Performed by: ANESTHESIOLOGY

## 2024-12-04 PROCEDURE — 2500000003 HC RX 250 WO HCPCS

## 2024-12-04 PROCEDURE — 6360000002 HC RX W HCPCS: Performed by: SURGERY

## 2024-12-04 PROCEDURE — 2709999900 HC NON-CHARGEABLE SUPPLY: Performed by: SURGERY

## 2024-12-04 PROCEDURE — 88307 TISSUE EXAM BY PATHOLOGIST: CPT

## 2024-12-04 PROCEDURE — 44145 PARTIAL REMOVAL OF COLON: CPT | Performed by: SURGERY

## 2024-12-04 PROCEDURE — 3700000001 HC ADD 15 MINUTES (ANESTHESIA): Performed by: SURGERY

## 2024-12-04 PROCEDURE — 2580000003 HC RX 258

## 2024-12-04 PROCEDURE — 2700000000 HC OXYGEN THERAPY PER DAY

## 2024-12-04 PROCEDURE — 6360000002 HC RX W HCPCS

## 2024-12-04 PROCEDURE — 2580000003 HC RX 258: Performed by: SURGERY

## 2024-12-04 PROCEDURE — 0DJD4ZZ INSPECTION OF LOWER INTESTINAL TRACT, PERCUTANEOUS ENDOSCOPIC APPROACH: ICD-10-PCS | Performed by: SURGERY

## 2024-12-04 PROCEDURE — 3E0T3BZ INTRODUCTION OF ANESTHETIC AGENT INTO PERIPHERAL NERVES AND PLEXI, PERCUTANEOUS APPROACH: ICD-10-PCS | Performed by: ANESTHESIOLOGY

## 2024-12-04 PROCEDURE — 2580000003 HC RX 258: Performed by: ANESTHESIOLOGY

## 2024-12-04 PROCEDURE — 3600000014 HC SURGERY LEVEL 4 ADDTL 15MIN: Performed by: SURGERY

## 2024-12-04 PROCEDURE — 6370000000 HC RX 637 (ALT 250 FOR IP): Performed by: ANESTHESIOLOGY

## 2024-12-04 PROCEDURE — 6370000000 HC RX 637 (ALT 250 FOR IP): Performed by: SURGERY

## 2024-12-04 PROCEDURE — 6360000002 HC RX W HCPCS: Performed by: NURSE ANESTHETIST, CERTIFIED REGISTERED

## 2024-12-04 PROCEDURE — 7100000001 HC PACU RECOVERY - ADDTL 15 MIN

## 2024-12-04 PROCEDURE — 2000000000 HC ICU R&B

## 2024-12-04 PROCEDURE — 94760 N-INVAS EAR/PLS OXIMETRY 1: CPT

## 2024-12-04 PROCEDURE — 2580000003 HC RX 258: Performed by: NURSE PRACTITIONER

## 2024-12-04 PROCEDURE — A4217 STERILE WATER/SALINE, 500 ML: HCPCS | Performed by: SURGERY

## 2024-12-04 PROCEDURE — 7100000000 HC PACU RECOVERY - FIRST 15 MIN

## 2024-12-04 PROCEDURE — 2500000003 HC RX 250 WO HCPCS: Performed by: ANESTHESIOLOGY

## 2024-12-04 PROCEDURE — P9045 ALBUMIN (HUMAN), 5%, 250 ML: HCPCS

## 2024-12-04 PROCEDURE — 2720000010 HC SURG SUPPLY STERILE: Performed by: SURGERY

## 2024-12-04 PROCEDURE — 0DTN0ZZ RESECTION OF SIGMOID COLON, OPEN APPROACH: ICD-10-PCS | Performed by: SURGERY

## 2024-12-04 PROCEDURE — 64488 TAP BLOCK BI INJECTION: CPT | Performed by: ANESTHESIOLOGY

## 2024-12-04 PROCEDURE — 3700000000 HC ANESTHESIA ATTENDED CARE: Performed by: SURGERY

## 2024-12-04 PROCEDURE — 74018 RADEX ABDOMEN 1 VIEW: CPT

## 2024-12-04 RX ORDER — ONDANSETRON 2 MG/ML
4 INJECTION INTRAMUSCULAR; INTRAVENOUS EVERY 6 HOURS PRN
Status: DISCONTINUED | OUTPATIENT
Start: 2024-12-04 | End: 2024-12-14 | Stop reason: HOSPADM

## 2024-12-04 RX ORDER — SODIUM CHLORIDE 0.9 % (FLUSH) 0.9 %
5-40 SYRINGE (ML) INJECTION EVERY 12 HOURS SCHEDULED
Status: DISCONTINUED | OUTPATIENT
Start: 2024-12-04 | End: 2024-12-04 | Stop reason: SDUPTHER

## 2024-12-04 RX ORDER — BUPIVACAINE HYDROCHLORIDE 2.5 MG/ML
INJECTION, SOLUTION EPIDURAL; INFILTRATION; INTRACAUDAL
Status: DISCONTINUED | OUTPATIENT
Start: 2024-12-04 | End: 2024-12-04 | Stop reason: SDUPTHER

## 2024-12-04 RX ORDER — ONDANSETRON 2 MG/ML
4 INJECTION INTRAMUSCULAR; INTRAVENOUS
Status: DISPENSED | OUTPATIENT
Start: 2024-12-04 | End: 2024-12-05

## 2024-12-04 RX ORDER — METHOCARBAMOL 100 MG/ML
INJECTION, SOLUTION INTRAMUSCULAR; INTRAVENOUS
Status: DISCONTINUED | OUTPATIENT
Start: 2024-12-04 | End: 2024-12-04 | Stop reason: SDUPTHER

## 2024-12-04 RX ORDER — MAGNESIUM SULFATE IN WATER 40 MG/ML
2000 INJECTION, SOLUTION INTRAVENOUS PRN
Status: DISCONTINUED | OUTPATIENT
Start: 2024-12-04 | End: 2024-12-14 | Stop reason: HOSPADM

## 2024-12-04 RX ORDER — SODIUM CHLORIDE 0.9 % (FLUSH) 0.9 %
5-40 SYRINGE (ML) INJECTION PRN
Status: DISCONTINUED | OUTPATIENT
Start: 2024-12-04 | End: 2024-12-04 | Stop reason: SDUPTHER

## 2024-12-04 RX ORDER — ALBUMIN HUMAN 50 G/1000ML
SOLUTION INTRAVENOUS
Status: DISCONTINUED | OUTPATIENT
Start: 2024-12-04 | End: 2024-12-04 | Stop reason: SDUPTHER

## 2024-12-04 RX ORDER — LORAZEPAM 2 MG/ML
INJECTION INTRAMUSCULAR
Status: COMPLETED
Start: 2024-12-04 | End: 2024-12-04

## 2024-12-04 RX ORDER — ONDANSETRON 2 MG/ML
INJECTION INTRAMUSCULAR; INTRAVENOUS
Status: DISCONTINUED | OUTPATIENT
Start: 2024-12-04 | End: 2024-12-04 | Stop reason: SDUPTHER

## 2024-12-04 RX ORDER — OXYCODONE HYDROCHLORIDE 10 MG/1
10 TABLET ORAL PRN
Status: DISCONTINUED | OUTPATIENT
Start: 2024-12-04 | End: 2024-12-04

## 2024-12-04 RX ORDER — SODIUM CHLORIDE 9 MG/ML
INJECTION, SOLUTION INTRAVENOUS PRN
Status: DISCONTINUED | OUTPATIENT
Start: 2024-12-04 | End: 2024-12-14 | Stop reason: HOSPADM

## 2024-12-04 RX ORDER — ACETAMINOPHEN 650 MG/1
650 SUPPOSITORY RECTAL EVERY 6 HOURS PRN
Status: DISCONTINUED | OUTPATIENT
Start: 2024-12-04 | End: 2024-12-06

## 2024-12-04 RX ORDER — KETOROLAC TROMETHAMINE 30 MG/ML
60 INJECTION, SOLUTION INTRAMUSCULAR; INTRAVENOUS EVERY 6 HOURS
Status: DISCONTINUED | OUTPATIENT
Start: 2024-12-04 | End: 2024-12-04

## 2024-12-04 RX ORDER — LORAZEPAM 2 MG/ML
0.5 INJECTION INTRAMUSCULAR
Status: COMPLETED | OUTPATIENT
Start: 2024-12-04 | End: 2024-12-04

## 2024-12-04 RX ORDER — EPHEDRINE SULFATE/0.9% NACL/PF 25 MG/5 ML
SYRINGE (ML) INTRAVENOUS
Status: DISCONTINUED | OUTPATIENT
Start: 2024-12-04 | End: 2024-12-04 | Stop reason: SDUPTHER

## 2024-12-04 RX ORDER — SODIUM CHLORIDE 9 MG/ML
INJECTION, SOLUTION INTRAVENOUS PRN
Status: DISCONTINUED | OUTPATIENT
Start: 2024-12-04 | End: 2024-12-04 | Stop reason: HOSPADM

## 2024-12-04 RX ORDER — KETOROLAC TROMETHAMINE 30 MG/ML
INJECTION, SOLUTION INTRAMUSCULAR; INTRAVENOUS
Status: DISCONTINUED | OUTPATIENT
Start: 2024-12-04 | End: 2024-12-04 | Stop reason: SDUPTHER

## 2024-12-04 RX ORDER — ACETAMINOPHEN 325 MG/1
650 TABLET ORAL EVERY 6 HOURS PRN
Status: DISCONTINUED | OUTPATIENT
Start: 2024-12-04 | End: 2024-12-06

## 2024-12-04 RX ORDER — BUPROPION HYDROCHLORIDE 150 MG/1
300 TABLET ORAL EVERY MORNING
Status: DISCONTINUED | OUTPATIENT
Start: 2024-12-05 | End: 2024-12-14 | Stop reason: HOSPADM

## 2024-12-04 RX ORDER — DEXAMETHASONE SODIUM PHOSPHATE 4 MG/ML
INJECTION, SOLUTION INTRA-ARTICULAR; INTRALESIONAL; INTRAMUSCULAR; INTRAVENOUS; SOFT TISSUE
Status: DISCONTINUED | OUTPATIENT
Start: 2024-12-04 | End: 2024-12-04 | Stop reason: SDUPTHER

## 2024-12-04 RX ORDER — OXYCODONE HYDROCHLORIDE 5 MG/1
5 TABLET ORAL PRN
Status: DISCONTINUED | OUTPATIENT
Start: 2024-12-04 | End: 2024-12-04

## 2024-12-04 RX ORDER — MAGNESIUM HYDROXIDE 1200 MG/15ML
LIQUID ORAL CONTINUOUS PRN
Status: COMPLETED | OUTPATIENT
Start: 2024-12-04 | End: 2024-12-04

## 2024-12-04 RX ORDER — SODIUM CHLORIDE 0.9 % (FLUSH) 0.9 %
5-40 SYRINGE (ML) INJECTION EVERY 12 HOURS SCHEDULED
Status: DISCONTINUED | OUTPATIENT
Start: 2024-12-04 | End: 2024-12-04 | Stop reason: HOSPADM

## 2024-12-04 RX ORDER — SODIUM CHLORIDE 0.9 % (FLUSH) 0.9 %
5-40 SYRINGE (ML) INJECTION PRN
Status: DISCONTINUED | OUTPATIENT
Start: 2024-12-04 | End: 2024-12-14 | Stop reason: HOSPADM

## 2024-12-04 RX ORDER — SODIUM CHLORIDE 0.9 % (FLUSH) 0.9 %
5-40 SYRINGE (ML) INJECTION PRN
Status: DISCONTINUED | OUTPATIENT
Start: 2024-12-04 | End: 2024-12-04 | Stop reason: HOSPADM

## 2024-12-04 RX ORDER — SODIUM CHLORIDE 9 MG/ML
INJECTION, SOLUTION INTRAVENOUS PRN
Status: DISCONTINUED | OUTPATIENT
Start: 2024-12-04 | End: 2024-12-04 | Stop reason: SDUPTHER

## 2024-12-04 RX ORDER — SODIUM CHLORIDE 0.9 % (FLUSH) 0.9 %
5-40 SYRINGE (ML) INJECTION EVERY 12 HOURS SCHEDULED
Status: DISCONTINUED | OUTPATIENT
Start: 2024-12-04 | End: 2024-12-14 | Stop reason: HOSPADM

## 2024-12-04 RX ORDER — SODIUM CHLORIDE 9 MG/ML
INJECTION, SOLUTION INTRAVENOUS CONTINUOUS
Status: DISCONTINUED | OUTPATIENT
Start: 2024-12-04 | End: 2024-12-04

## 2024-12-04 RX ORDER — FENTANYL CITRATE 50 UG/ML
25 INJECTION, SOLUTION INTRAMUSCULAR; INTRAVENOUS
Status: DISCONTINUED | OUTPATIENT
Start: 2024-12-04 | End: 2024-12-04

## 2024-12-04 RX ORDER — CEFOXITIN SODIUM 2 G/50ML
2000 INJECTION, SOLUTION INTRAVENOUS ONCE
Status: COMPLETED | OUTPATIENT
Start: 2024-12-04 | End: 2024-12-04

## 2024-12-04 RX ORDER — LIDOCAINE HYDROCHLORIDE 20 MG/ML
INJECTION, SOLUTION EPIDURAL; INFILTRATION; INTRACAUDAL; PERINEURAL
Status: DISCONTINUED | OUTPATIENT
Start: 2024-12-04 | End: 2024-12-04 | Stop reason: SDUPTHER

## 2024-12-04 RX ORDER — ONDANSETRON 4 MG/1
4 TABLET, ORALLY DISINTEGRATING ORAL EVERY 8 HOURS PRN
Status: DISCONTINUED | OUTPATIENT
Start: 2024-12-04 | End: 2024-12-14 | Stop reason: HOSPADM

## 2024-12-04 RX ORDER — SCOLOPAMINE TRANSDERMAL SYSTEM 1 MG/1
1 PATCH, EXTENDED RELEASE TRANSDERMAL ONCE
Status: COMPLETED | OUTPATIENT
Start: 2024-12-04 | End: 2024-12-04

## 2024-12-04 RX ORDER — POLYETHYLENE GLYCOL 3350 17 G/17G
17 POWDER, FOR SOLUTION ORAL DAILY PRN
Status: DISCONTINUED | OUTPATIENT
Start: 2024-12-04 | End: 2024-12-14 | Stop reason: HOSPADM

## 2024-12-04 RX ORDER — HEPARIN SODIUM 5000 [USP'U]/ML
5000 INJECTION, SOLUTION INTRAVENOUS; SUBCUTANEOUS ONCE
Status: COMPLETED | OUTPATIENT
Start: 2024-12-04 | End: 2024-12-04

## 2024-12-04 RX ORDER — ROCURONIUM BROMIDE 10 MG/ML
INJECTION, SOLUTION INTRAVENOUS
Status: DISCONTINUED | OUTPATIENT
Start: 2024-12-04 | End: 2024-12-04 | Stop reason: SDUPTHER

## 2024-12-04 RX ORDER — KETOROLAC TROMETHAMINE 30 MG/ML
30 INJECTION, SOLUTION INTRAMUSCULAR; INTRAVENOUS EVERY 6 HOURS
Status: DISCONTINUED | OUTPATIENT
Start: 2024-12-04 | End: 2024-12-08

## 2024-12-04 RX ORDER — ESMOLOL HYDROCHLORIDE 10 MG/ML
INJECTION INTRAVENOUS
Status: DISCONTINUED | OUTPATIENT
Start: 2024-12-04 | End: 2024-12-04 | Stop reason: SDUPTHER

## 2024-12-04 RX ORDER — PHENYLEPHRINE HCL IN 0.9% NACL 1 MG/10 ML
SYRINGE (ML) INTRAVENOUS
Status: DISCONTINUED | OUTPATIENT
Start: 2024-12-04 | End: 2024-12-04 | Stop reason: SDUPTHER

## 2024-12-04 RX ORDER — NALOXONE HYDROCHLORIDE 0.4 MG/ML
INJECTION, SOLUTION INTRAMUSCULAR; INTRAVENOUS; SUBCUTANEOUS PRN
Status: DISCONTINUED | OUTPATIENT
Start: 2024-12-04 | End: 2024-12-08 | Stop reason: HOSPADM

## 2024-12-04 RX ORDER — POTASSIUM CHLORIDE 7.45 MG/ML
10 INJECTION INTRAVENOUS PRN
Status: DISCONTINUED | OUTPATIENT
Start: 2024-12-04 | End: 2024-12-09

## 2024-12-04 RX ORDER — POTASSIUM CHLORIDE 1500 MG/1
40 TABLET, EXTENDED RELEASE ORAL PRN
Status: DISCONTINUED | OUTPATIENT
Start: 2024-12-04 | End: 2024-12-09

## 2024-12-04 RX ORDER — PROPOFOL 10 MG/ML
INJECTION, EMULSION INTRAVENOUS
Status: DISCONTINUED | OUTPATIENT
Start: 2024-12-04 | End: 2024-12-04 | Stop reason: SDUPTHER

## 2024-12-04 RX ORDER — MIDAZOLAM HYDROCHLORIDE 1 MG/ML
INJECTION, SOLUTION INTRAMUSCULAR; INTRAVENOUS
Status: DISCONTINUED | OUTPATIENT
Start: 2024-12-04 | End: 2024-12-04 | Stop reason: SDUPTHER

## 2024-12-04 RX ORDER — SODIUM CHLORIDE 9 MG/ML
INJECTION, SOLUTION INTRAVENOUS CONTINUOUS
Status: DISCONTINUED | OUTPATIENT
Start: 2024-12-04 | End: 2024-12-05

## 2024-12-04 RX ORDER — GLYCOPYRROLATE 0.2 MG/ML
INJECTION INTRAMUSCULAR; INTRAVENOUS
Status: DISCONTINUED | OUTPATIENT
Start: 2024-12-04 | End: 2024-12-04 | Stop reason: SDUPTHER

## 2024-12-04 RX ORDER — ENOXAPARIN SODIUM 100 MG/ML
40 INJECTION SUBCUTANEOUS DAILY
Status: DISCONTINUED | OUTPATIENT
Start: 2024-12-05 | End: 2024-12-14 | Stop reason: HOSPADM

## 2024-12-04 RX ORDER — KETOROLAC TROMETHAMINE 15 MG/ML
15 INJECTION, SOLUTION INTRAMUSCULAR; INTRAVENOUS EVERY 6 HOURS
Status: DISCONTINUED | OUTPATIENT
Start: 2024-12-04 | End: 2024-12-04

## 2024-12-04 RX ORDER — 0.9 % SODIUM CHLORIDE 0.9 %
500 INTRAVENOUS SOLUTION INTRAVENOUS ONCE
Status: COMPLETED | OUTPATIENT
Start: 2024-12-04 | End: 2024-12-05

## 2024-12-04 RX ADMIN — BUPIVACAINE HYDROCHLORIDE 30 ML: 2.5 INJECTION, SOLUTION EPIDURAL; INFILTRATION; INTRACAUDAL at 16:11

## 2024-12-04 RX ADMIN — SCOPALAMINE 1 PATCH: 1 PATCH, EXTENDED RELEASE TRANSDERMAL at 12:15

## 2024-12-04 RX ADMIN — Medication 200 MCG: at 13:06

## 2024-12-04 RX ADMIN — ROCURONIUM BROMIDE 20 MG: 10 INJECTION, SOLUTION INTRAVENOUS at 13:20

## 2024-12-04 RX ADMIN — Medication 10 MG: at 13:56

## 2024-12-04 RX ADMIN — ONDANSETRON 4 MG: 2 INJECTION INTRAMUSCULAR; INTRAVENOUS at 12:30

## 2024-12-04 RX ADMIN — Medication 20 MG: at 12:22

## 2024-12-04 RX ADMIN — SODIUM CHLORIDE: 9 INJECTION, SOLUTION INTRAVENOUS at 21:01

## 2024-12-04 RX ADMIN — CEFOXITIN SODIUM 2000 MG: 2 INJECTION, SOLUTION INTRAVENOUS at 12:25

## 2024-12-04 RX ADMIN — METHOCARBAMOL 50 MG: 100 INJECTION INTRAMUSCULAR; INTRAVENOUS at 13:42

## 2024-12-04 RX ADMIN — ROCURONIUM BROMIDE 10 MG: 10 INJECTION, SOLUTION INTRAVENOUS at 14:33

## 2024-12-04 RX ADMIN — SODIUM CHLORIDE 500 ML: 9 INJECTION, SOLUTION INTRAVENOUS at 23:26

## 2024-12-04 RX ADMIN — CEFOXITIN SODIUM 2000 MG: 2 INJECTION, SOLUTION INTRAVENOUS at 14:23

## 2024-12-04 RX ADMIN — ESMOLOL HYDROCHLORIDE 30 MG: 10 INJECTION, SOLUTION INTRAVENOUS at 12:22

## 2024-12-04 RX ADMIN — ROCURONIUM BROMIDE 20 MG: 10 INJECTION, SOLUTION INTRAVENOUS at 12:55

## 2024-12-04 RX ADMIN — LORAZEPAM 0.5 MG: 2 INJECTION INTRAMUSCULAR; INTRAVENOUS at 15:48

## 2024-12-04 RX ADMIN — SODIUM CHLORIDE: 9 INJECTION, SOLUTION INTRAVENOUS at 16:21

## 2024-12-04 RX ADMIN — MIDAZOLAM 1 MG: 1 INJECTION INTRAMUSCULAR; INTRAVENOUS at 12:18

## 2024-12-04 RX ADMIN — HEPARIN SODIUM 5000 UNITS: 5000 INJECTION INTRAVENOUS; SUBCUTANEOUS at 11:18

## 2024-12-04 RX ADMIN — LORAZEPAM 0.5 MG: 2 INJECTION INTRAMUSCULAR; INTRAVENOUS at 23:03

## 2024-12-04 RX ADMIN — SODIUM CHLORIDE: 9 INJECTION, SOLUTION INTRAVENOUS at 11:15

## 2024-12-04 RX ADMIN — LIDOCAINE HYDROCHLORIDE 60 MG: 20 INJECTION, SOLUTION EPIDURAL; INFILTRATION; INTRACAUDAL; PERINEURAL at 12:22

## 2024-12-04 RX ADMIN — PHENYLEPHRINE HYDROCHLORIDE 30 MCG/MIN: 10 INJECTION INTRAVENOUS at 13:08

## 2024-12-04 RX ADMIN — SODIUM CHLORIDE 0.12 MG/KG/HR: 9 INJECTION, SOLUTION INTRAVENOUS at 16:18

## 2024-12-04 RX ADMIN — METHOCARBAMOL 500 MG: 100 INJECTION INTRAMUSCULAR; INTRAVENOUS at 15:14

## 2024-12-04 RX ADMIN — DEXAMETHASONE SODIUM PHOSPHATE 8 MG: 4 INJECTION, SOLUTION INTRAMUSCULAR; INTRAVENOUS at 12:30

## 2024-12-04 RX ADMIN — Medication 100 MCG: at 12:48

## 2024-12-04 RX ADMIN — ROCURONIUM BROMIDE 10 MG: 10 INJECTION, SOLUTION INTRAVENOUS at 13:53

## 2024-12-04 RX ADMIN — METHOCARBAMOL 100 MG: 100 INJECTION INTRAMUSCULAR; INTRAVENOUS at 14:28

## 2024-12-04 RX ADMIN — ALBUMIN (HUMAN) 12.5 G: 12.5 INJECTION, SOLUTION INTRAVENOUS at 13:14

## 2024-12-04 RX ADMIN — Medication 100 MCG: at 13:00

## 2024-12-04 RX ADMIN — EPHEDRINE SULFATE 10 MG: 5 INJECTION INTRAVENOUS at 12:52

## 2024-12-04 RX ADMIN — BUPIVACAINE 20 ML: 13.3 INJECTION, SUSPENSION, LIPOSOMAL INFILTRATION at 16:11

## 2024-12-04 RX ADMIN — MIDAZOLAM 1 MG: 1 INJECTION INTRAMUSCULAR; INTRAVENOUS at 12:22

## 2024-12-04 RX ADMIN — KETOROLAC TROMETHAMINE 30 MG: 30 INJECTION, SOLUTION INTRAMUSCULAR at 14:45

## 2024-12-04 RX ADMIN — Medication 25 MG: at 15:20

## 2024-12-04 RX ADMIN — ACETAMINOPHEN 650 MG: 325 TABLET ORAL at 23:38

## 2024-12-04 RX ADMIN — SODIUM CHLORIDE: 9 INJECTION, SOLUTION INTRAVENOUS at 14:28

## 2024-12-04 RX ADMIN — Medication 10 MG: at 14:37

## 2024-12-04 RX ADMIN — METHOCARBAMOL 50 MG: 100 INJECTION INTRAMUSCULAR; INTRAVENOUS at 14:17

## 2024-12-04 RX ADMIN — ALBUMIN (HUMAN) 12.5 G: 12.5 INJECTION, SOLUTION INTRAVENOUS at 13:56

## 2024-12-04 RX ADMIN — ROCURONIUM BROMIDE 50 MG: 10 INJECTION, SOLUTION INTRAVENOUS at 12:22

## 2024-12-04 RX ADMIN — PROPOFOL 150 MG: 10 INJECTION, EMULSION INTRAVENOUS at 12:22

## 2024-12-04 RX ADMIN — METHOCARBAMOL 200 MG: 100 INJECTION INTRAMUSCULAR; INTRAVENOUS at 15:10

## 2024-12-04 RX ADMIN — Medication 10 MG: at 13:20

## 2024-12-04 RX ADMIN — Medication 200 MCG: at 12:50

## 2024-12-04 RX ADMIN — GLYCOPYRROLATE 0.2 MG: 0.2 INJECTION INTRAMUSCULAR; INTRAVENOUS at 12:50

## 2024-12-04 RX ADMIN — GLYCOPYRROLATE 0.1 MG: 0.2 INJECTION INTRAMUSCULAR; INTRAVENOUS at 12:30

## 2024-12-04 RX ADMIN — EPHEDRINE SULFATE 5 MG: 5 INJECTION INTRAVENOUS at 14:00

## 2024-12-04 RX ADMIN — KETOROLAC TROMETHAMINE 30 MG: 30 INJECTION, SOLUTION INTRAMUSCULAR at 21:00

## 2024-12-04 RX ADMIN — Medication 100 MCG: at 13:18

## 2024-12-04 RX ADMIN — METHOCARBAMOL 100 MG: 100 INJECTION INTRAMUSCULAR; INTRAVENOUS at 14:35

## 2024-12-04 RX ADMIN — Medication 25 MG: at 15:14

## 2024-12-04 ASSESSMENT — PAIN DESCRIPTION - DESCRIPTORS
DESCRIPTORS: SHARP
DESCRIPTORS: SHARP

## 2024-12-04 ASSESSMENT — PAIN SCALES - GENERAL
PAINLEVEL_OUTOF10: 9
PAINLEVEL_OUTOF10: 9
PAINLEVEL_OUTOF10: 10

## 2024-12-04 ASSESSMENT — PAIN - FUNCTIONAL ASSESSMENT
PAIN_FUNCTIONAL_ASSESSMENT: PREVENTS OR INTERFERES WITH ALL ACTIVE AND SOME PASSIVE ACTIVITIES
PAIN_FUNCTIONAL_ASSESSMENT: PREVENTS OR INTERFERES WITH MANY ACTIVE NOT PASSIVE ACTIVITIES
PAIN_FUNCTIONAL_ASSESSMENT: 0-10

## 2024-12-04 ASSESSMENT — PAIN DESCRIPTION - PAIN TYPE
TYPE: SURGICAL PAIN
TYPE: SURGICAL PAIN

## 2024-12-04 ASSESSMENT — PAIN DESCRIPTION - ONSET
ONSET: ON-GOING
ONSET: ON-GOING

## 2024-12-04 ASSESSMENT — PAIN DESCRIPTION - FREQUENCY
FREQUENCY: CONTINUOUS
FREQUENCY: CONTINUOUS

## 2024-12-04 ASSESSMENT — PAIN DESCRIPTION - ORIENTATION
ORIENTATION: MID
ORIENTATION: MID

## 2024-12-04 ASSESSMENT — PAIN DESCRIPTION - LOCATION
LOCATION: ABDOMEN
LOCATION: ABDOMEN

## 2024-12-04 ASSESSMENT — ENCOUNTER SYMPTOMS: SHORTNESS OF BREATH: 0

## 2024-12-04 NOTE — PLAN OF CARE
Problem: Discharge Planning  Goal: Discharge to home or other facility with appropriate resources  Outcome: Progressing  Flowsheets (Taken 12/4/2024 6418)  Discharge to home or other facility with appropriate resources:   Identify barriers to discharge with patient and caregiver   Arrange for needed discharge resources and transportation as appropriate   Identify discharge learning needs (meds, wound care, etc)   Refer to discharge planning if patient needs post-hospital services based on physician order or complex needs related to functional status, cognitive ability or social support system     Problem: Pain  Goal: Verbalizes/displays adequate comfort level or baseline comfort level  Outcome: Progressing  Flowsheets (Taken 12/4/2024 1756)  Verbalizes/displays adequate comfort level or baseline comfort level:   Encourage patient to monitor pain and request assistance   Assess pain using appropriate pain scale   Administer analgesics based on type and severity of pain and evaluate response   Implement non-pharmacological measures as appropriate and evaluate response   Consider cultural and social influences on pain and pain management   Notify Licensed Independent Practitioner if interventions unsuccessful or patient reports new pain     Problem: Safety - Adult  Goal: Free from fall injury  Outcome: Progressing  Flowsheets (Taken 12/4/2024 1756)  Free From Fall Injury:   Instruct family/caregiver on patient safety   Based on caregiver fall risk screen, instruct family/caregiver to ask for assistance with transferring infant if caregiver noted to have fall risk factors     Problem: Skin/Tissue Integrity  Goal: Absence of new skin breakdown  Description: 1.  Monitor for areas of redness and/or skin breakdown  2.  Assess vascular access sites hourly  3.  Every 4-6 hours minimum:  Change oxygen saturation probe site  4.  Every 4-6 hours:  If on nasal continuous positive airway pressure, respiratory therapy assess

## 2024-12-04 NOTE — ANESTHESIA POSTPROCEDURE EVALUATION
SpO2:100 %  12/04/24 1545, BP:110/63, Pulse:69, Resp:10, SpO2:100 %  12/04/24 1540, BP:(!) 105/55, Pulse:64, Resp:16, SpO2:100 %  12/04/24 1536, BP:(!) 113/57, Pulse:76, Resp:24, SpO2:100 %  12/04/24 1530, BP:(!) 103/55, Pulse:75, Resp:24, SpO2:100 %  12/04/24 1525, BP:(!) 107/57, Pulse:72, Resp:22, SpO2:95 %  12/04/24 1522, BP:(!) 109/56, Temp:(!) 94.5 °F (34.7 °C), Temp src:Axillary, Pulse:76, Resp:14, SpO2:100 %, Weight:60.6 kg (133 lb 9.6 oz)  12/04/24 1106, BP:115/74, Temp:98 °F (36.7 °C), Temp src:Oral, Pulse:84, Resp:18, SpO2:97 %     LABS:    CBC  Lab Results       Component                Value               Date/Time                  WBC                      6.1                 10/29/2024 03:20 AM        HGB                      9.7 (L)             10/29/2024 03:20 AM        HCT                      28.9 (L)            10/29/2024 03:20 AM        PLT                      259                 10/29/2024 03:20 AM   RENAL  Lab Results       Component                Value               Date/Time                  NA                       142                 10/29/2024 03:20 AM        K                        3.9                 10/29/2024 03:20 AM        CL                       111 (H)             10/29/2024 03:20 AM        CO2                      23                  10/29/2024 03:20 AM        BUN                      13                  10/29/2024 03:20 AM        CREATININE               0.6                 10/29/2024 03:20 AM        GLUCOSE                  121 (H)             10/29/2024 03:20 AM   COAGS  No results found for: \"PROTIME\", \"INR\", \"APTT\"    Intake & Output:  @24HRIO@    Nausea & Vomiting:  No    Level of Consciousness:  Awake    Pain Assessment:  Adequate analgesia    Anesthesia Complications:  No apparent anesthetic complications    SUMMARY      Vital signs stable  OK to discharge from Stage I post anesthesia care.  Care transferred from Anesthesiology department on discharge from perioperative

## 2024-12-04 NOTE — H&P
PATIENT NAME: Sabina Rios   YOB: 1965    ADMISSION DATE: 12/4/2024 10:48 AM      TODAY'S DATE: 12/4/2024    CHIEF COMPLAINT:  abdominal pain      HISTORY OF PRESENT ILLNESS:  The patient is a 59 y.o. female  who presents with recurring episodes of diverticulitis of the descending and sigmoid colon. For resection today. The risks, benefits and alternatives to the planned procedure were discussed. Patient expressed an understanding and is willing to proceed.      Past Medical History:        Diagnosis Date    Anesthesia     complication with versad/fentanyl- respiratory depression @ 2000    Arthritis     B12 deficiency     Bilateral hearing loss     Diverticulitis 01/2018    Dysthymia     Family history of colon cancer     2 grandpa's and a sister.  Dr ridley aware    GERD (gastroesophageal reflux disease)     Hiatal hernia     HLD (hyperlipidemia)     Irritable bowel syndrome with constipation     sees dr ridley    Tinnitus        Past Surgical History:        Procedure Laterality Date    BREAST SURGERY  2006    saline    COLONOSCOPY  03/07/2018    Darrell- repeat 10 years    ENDOMETRIAL ABLATION      x 2    SIGMOIDOSCOPY N/A 6/12/2024    SIGMOIDOSCOPY DIAGNOSTIC FLEXIBLE performed by Moshe Ramírez Jr. DO at [facility] ENDOSCOPY    TONSILLECTOMY      UPPER GASTROINTESTINAL ENDOSCOPY N/A 6/12/2024    ESOPHAGOGASTRODUODENOSCOPY BIOPSY performed by Moshe Ramírez Jr., DO at [facility] ENDOSCOPY    UPPER GASTROINTESTINAL ENDOSCOPY N/A 6/12/2024    ESOPHAGOGASTRODUODENOSCOPY DILATION BALLOON performed by Moshe Ramírez Jr. DO at [facility] ENDOSCOPY    WRIST SURGERY Right 2008    torn ligaments       Medications Prior to Admission:   Medications Prior to Admission: denosumab (PROLIA) 60 MG/ML SOSY SC injection, Inject 1 mL into the skin once for 1 dose (Patient taking differently: Inject 1 mL into the skin once November 10th 2024 last dose)  ondansetron (ZOFRAN) 4 MG tablet, Take 1 tablet by  deformities, no tenderness  NEUROLOGIC:  Mental Status Exam:  Level of Alertness:   awake.    Cranial nerves 2-12 intact  Orientation:   person, place, time      ASSESSMENT AND PLAN:    Recurring diverticulitis  For sigmoid resection today  The risks, benefits and alternatives to the planned procedure were discussed. Patient expressed an understanding and is willing to proceed.      PRACTITIONER CERTIFICATION   I certify that Sabina Rios is expected to be hospitalized for 4 days based on the above assessment and plan.    DRAKE ERICKSON MD

## 2024-12-04 NOTE — ANESTHESIA PROCEDURE NOTES
Peripheral Block    Patient location during procedure: OR  Reason for block: post-op pain management and at surgeon's request  Start time: 12/4/2024 4:11 PM  End time: 12/4/2024 4:11 PM  Staffing  Performed: anesthesiologist   Anesthesiologist: Abel Burnett MD  Performed by: Abel Burnett MD  Authorized by: Abel Burnett MD    Preanesthetic Checklist  Completed: patient identified, IV checked, site marked, risks and benefits discussed, surgical/procedural consents, equipment checked, pre-op evaluation, timeout performed, anesthesia consent given, oxygen available and monitors applied/VS acknowledged  Peripheral Block   Patient position: supine  Prep: ChloraPrep  Provider prep: mask  Patient monitoring: cardiac monitor, continuous pulse ox, frequent blood pressure checks and IV access  Block type: TAP  Laterality: bilateral  Injection technique: single-shot  Guidance: ultrasound guided  Local infiltration: lidocaine  Infiltration strength: 1 %  Local infiltration: lidocaine  Dose: 3 mL    Needle   Needle gauge: 21 G  Needle localization: ultrasound guidance  Needle length: 10 cm  Assessment   Injection assessment: negative aspiration for heme, no paresthesia on injection and local visualized surrounding nerve on ultrasound  Paresthesia pain: none  Slow fractionated injection: yes  Hemodynamics: stable  Outcomes: uncomplicated and patient tolerated procedure well    Additional Notes  Immediately prior to procedure a \"time out\" was called to verify the correct patient, allergies, laterality, procedure and equipment. Time out performed with Estrellita ALMODOVAR    Local Anesthetic: 0.25 %  Bupivacaine   Amount: 20 ml  in 5 ml increments after negative aspiration each time. + 10ml Exparel each side    External Oblique muscle, Internal Oblique muscle, Transversus Abdominis muscle are identified; the tip of the needle and the spread of the local anesthetic between the Internal Oblique muscle and

## 2024-12-04 NOTE — BRIEF OP NOTE
Brief Postoperative Note      Patient: Sabina Rios  YOB: 1965  MRN: [id number]    Date of Procedure: 12/4/2024    Pre-Op Diagnosis Codes:      * Diverticulitis [K57.92]    Post-Op Diagnosis: Same       Procedure(s):  LAPAROSCOPIC CONVERTED TO OPEN SIGMOID COLECTOMY with coloproctostomy    Surgeon(s):  Drake Erickson MD    Assistant:  Surgical Assistant: Melonie Thomas    Anesthesia: General    Estimated Blood Loss (mL): less than 50     Complications: None    Specimens:   ID Type Source Tests Collected by Time Destination   A : A. Sigmoid Tissue Tissue SURGICAL PATHOLOGY Drake Erickson MD 12/4/2024 1445        Implants:  * No implants in log *      Drains:   Urinary Catheter 12/04/24 2 Way (Active)       Findings:  Infection Present At Time Of Surgery (PATOS) (choose all levels that have infection present):  - Organ Space infection (below fascia) present as evidenced by phlegmon  Other Findings: no complications  This procedure was not performed to treat colon cancer through resection    Electronically signed by DRAKE ERICKSON MD on 12/4/2024 at 2:47 PM

## 2024-12-04 NOTE — ANESTHESIA PRE PROCEDURE
Department of Anesthesiology  Preprocedure Note       Name:  Sabina Rios   Age:  59 y.o.  :  1965                                          MRN:  [id number]         Date:  2024      Surgeon: Surgeon(s):  Naveen Thayer MD    Procedure: Procedure(s):  LAPAROSCOPIC ASSISTED SIGMOID COLECTOMY    Medications prior to admission:   Prior to Admission medications    Medication Sig Start Date End Date Taking? Authorizing Provider   denosumab (PROLIA) 60 MG/ML SOSY SC injection Inject 1 mL into the skin once for 1 dose  Patient taking differently: Inject 1 mL into the skin once 2024 last dose 24 Yes Vera Vásquez APRN - NP   ondansetron (ZOFRAN) 4 MG tablet Take 1 tablet by mouth every 8 hours as needed for Nausea 24  Yes Jarrod Crocker APRN - CNP   lubiprostone (AMITIZA) 8 MCG CAPS capsule Take 1 capsule by mouth 2 times daily (with meals) 7/15/24  Yes Vera Vásquez APRN - NP   buPROPion (WELLBUTRIN XL) 300 MG extended release tablet Take 1 tablet by mouth every morning 7/15/24  Yes Vera Vásquez APRN - NP   Cyanocobalamin (B-12 COMPLIANCE INJECTION) 1000 MCG/ML KIT Inject 1,000 mcg as directed every 30 days  Patient taking differently: Inject 1,000 mcg as directed every 30 days 1st of every month 24   Vera Vásquez APRN - NP       Current medications:    Current Facility-Administered Medications   Medication Dose Route Frequency Provider Last Rate Last Admin   • sodium chloride flush 0.9 % injection 5-40 mL  5-40 mL IntraVENous 2 times per day Mirlande Rodarte MD       • sodium chloride flush 0.9 % injection 5-40 mL  5-40 mL IntraVENous PRN Mirlande Rodarte MD       • 0.9 % sodium chloride infusion   IntraVENous PRN Mirlande Rodarte  mL/hr at 24 1115 New Bag at 24 1115   • cefOXitin in dextrose (MEFOXIN) IVPB Duplex 2,000 mg  2,000 mg IntraVENous Once Naveen Thayer MD           Allergies:

## 2024-12-04 NOTE — PROGRESS NOTES
Dr. Burnett gave verbal order for 60 mg toradol q6hr scheduled for 6 doses. Dr. Naveen Thayer had ordered 15 mg toradol q6hr scheduled for 20 doses. Call placed to the office of Dr. Thayer to clarify order.     Dr. Thayer to bedside. Order updated to 30 mg toradol q6hr scheduled for 20 doses.

## 2024-12-04 NOTE — PROGRESS NOTES
4 Eyes Skin Assessment     NAME:  Sabina Rios  YOB: 1965  MEDICAL RECORD NUMBER:  [id number]    The patient is being assessed for  Admission/post op     I agree that at least one RN has performed a thorough Head to Toe Skin Assessment on the patient. ALL assessment sites listed below have been assessed.      Areas assessed by both nurses:    Head, Face, Ears, Shoulders, Back, Chest, Arms, Elbows, Hands, Sacrum. Buttock, Coccyx, Ischium, Legs. Feet and Heels, and Under Medical Devices         Does the Patient have a Wound? Yes wound(s) were present on assessment. LDA wound assessment was Initiated and completed by RN - midline abdominal incision and 3 lap sites       Sonny Prevention initiated by RN: Yes  Wound Care Orders initiated by RN: No    Pressure Injury (Stage 3,4, Unstageable, DTI, NWPT, and Complex wounds) if present, place Wound referral order by RN under : No    New Ostomies, if present place, Ostomy referral order under : No     Nurse 1 eSignature: Electronically signed by Angy Chavez RN on 12/4/24 at 4:57 PM EST    **SHARE this note so that the co-signing nurse can place an eSignature**    Nurse 2 eSignature: Electronically signed by Mckenzie Epperson RN on 12/4/24 at 4:59 PM EST

## 2024-12-04 NOTE — PROGRESS NOTES
Unable to complete all admission questions due to patient lethargic after surgery on ketamine infusion. Was able to complete some questions with , Pat, at bedside.

## 2024-12-04 NOTE — PROGRESS NOTES
Intra-Op xray done per Dr Thayer order. No retained foreign objects, sharps/needles and instruments per Magda from Radiology department and was read by Lion Saldana.

## 2024-12-04 NOTE — PROGRESS NOTES
1520- Patient arrived from OR to 2125. Report received from CRNA, connected to monitors. Vital signs stable on 2L oxygen per nasal cannula. Stone 9. Midline abdominal dressing clean, dry, intact. Noted to be inconsolable from pain, CRNA administered Robaxin and Ketamine.Temperature 94.5 F axillary, garrick hugger applied. Dr Burnett notified by CRNA of uncontrolled pain, plan to start ketamine infusion as patient highly intolerable of opioids. Patient turned and repositioned to promote comfort. Patient's  Pat updated and brought to bedside.     1550- Recovery completed, primary RN Christa to assume care. Stone remains 9, patient resting quietly. States pain still 10/10, ketamine infusion to be started.

## 2024-12-05 LAB
ANION GAP SERPL CALCULATED.3IONS-SCNC: 8 MMOL/L (ref 3–16)
BASOPHILS # BLD: 0 K/UL (ref 0–0.2)
BASOPHILS NFR BLD: 0.3 %
BUN SERPL-MCNC: 8 MG/DL (ref 7–20)
CALCIUM SERPL-MCNC: 6.9 MG/DL (ref 8.3–10.6)
CHLORIDE SERPL-SCNC: 111 MMOL/L (ref 99–110)
CO2 SERPL-SCNC: 20 MMOL/L (ref 21–32)
CREAT SERPL-MCNC: 0.6 MG/DL (ref 0.6–1.1)
DEPRECATED RDW RBC AUTO: 13.9 % (ref 12.4–15.4)
EOSINOPHIL # BLD: 0 K/UL (ref 0–0.6)
EOSINOPHIL NFR BLD: 0 %
GFR SERPLBLD CREATININE-BSD FMLA CKD-EPI: >90 ML/MIN/{1.73_M2}
GLUCOSE SERPL-MCNC: 109 MG/DL (ref 70–99)
HCT VFR BLD AUTO: 33.3 % (ref 36–48)
HGB BLD-MCNC: 11.1 G/DL (ref 12–16)
LYMPHOCYTES # BLD: 1.6 K/UL (ref 1–5.1)
LYMPHOCYTES NFR BLD: 16.3 %
MCH RBC QN AUTO: 31.2 PG (ref 26–34)
MCHC RBC AUTO-ENTMCNC: 33.3 G/DL (ref 31–36)
MCV RBC AUTO: 93.7 FL (ref 80–100)
MONOCYTES # BLD: 1 K/UL (ref 0–1.3)
MONOCYTES NFR BLD: 10.4 %
NEUTROPHILS # BLD: 7.1 K/UL (ref 1.7–7.7)
NEUTROPHILS NFR BLD: 73 %
PLATELET # BLD AUTO: 273 K/UL (ref 135–450)
PMV BLD AUTO: 7.6 FL (ref 5–10.5)
POTASSIUM SERPL-SCNC: 4.4 MMOL/L (ref 3.5–5.1)
RBC # BLD AUTO: 3.55 M/UL (ref 4–5.2)
SODIUM SERPL-SCNC: 139 MMOL/L (ref 136–145)
WBC # BLD AUTO: 9.7 K/UL (ref 4–11)

## 2024-12-05 PROCEDURE — 6360000002 HC RX W HCPCS: Performed by: SURGERY

## 2024-12-05 PROCEDURE — 80048 BASIC METABOLIC PNL TOTAL CA: CPT

## 2024-12-05 PROCEDURE — 6370000000 HC RX 637 (ALT 250 FOR IP): Performed by: SURGERY

## 2024-12-05 PROCEDURE — 97166 OT EVAL MOD COMPLEX 45 MIN: CPT

## 2024-12-05 PROCEDURE — 2000000000 HC ICU R&B

## 2024-12-05 PROCEDURE — 2580000003 HC RX 258: Performed by: SURGERY

## 2024-12-05 PROCEDURE — 85025 COMPLETE CBC W/AUTO DIFF WBC: CPT

## 2024-12-05 PROCEDURE — APPNB15 APP NON BILLABLE TIME 0-15 MINS: Performed by: PHYSICIAN ASSISTANT

## 2024-12-05 PROCEDURE — 97530 THERAPEUTIC ACTIVITIES: CPT

## 2024-12-05 PROCEDURE — 97162 PT EVAL MOD COMPLEX 30 MIN: CPT

## 2024-12-05 PROCEDURE — 36415 COLL VENOUS BLD VENIPUNCTURE: CPT

## 2024-12-05 PROCEDURE — APPSS15 APP SPLIT SHARED TIME 0-15 MINUTES: Performed by: PHYSICIAN ASSISTANT

## 2024-12-05 PROCEDURE — 99024 POSTOP FOLLOW-UP VISIT: CPT | Performed by: PHYSICIAN ASSISTANT

## 2024-12-05 PROCEDURE — 94760 N-INVAS EAR/PLS OXIMETRY 1: CPT

## 2024-12-05 RX ORDER — SIMETHICONE 80 MG
80 TABLET,CHEWABLE ORAL EVERY 6 HOURS PRN
Status: DISCONTINUED | OUTPATIENT
Start: 2024-12-05 | End: 2024-12-14 | Stop reason: HOSPADM

## 2024-12-05 RX ORDER — SODIUM CHLORIDE 9 MG/ML
INJECTION, SOLUTION INTRAVENOUS CONTINUOUS
Status: ACTIVE | OUTPATIENT
Start: 2024-12-05 | End: 2024-12-06

## 2024-12-05 RX ORDER — LORAZEPAM 2 MG/ML
0.5 INJECTION INTRAMUSCULAR EVERY 4 HOURS PRN
Status: DISCONTINUED | OUTPATIENT
Start: 2024-12-05 | End: 2024-12-14 | Stop reason: HOSPADM

## 2024-12-05 RX ADMIN — ACETAMINOPHEN 650 MG: 325 TABLET ORAL at 09:09

## 2024-12-05 RX ADMIN — KETOROLAC TROMETHAMINE 30 MG: 30 INJECTION, SOLUTION INTRAMUSCULAR at 09:08

## 2024-12-05 RX ADMIN — SODIUM CHLORIDE: 9 INJECTION, SOLUTION INTRAVENOUS at 16:32

## 2024-12-05 RX ADMIN — BUPROPION HYDROCHLORIDE 300 MG: 150 TABLET, EXTENDED RELEASE ORAL at 09:08

## 2024-12-05 RX ADMIN — ONDANSETRON 4 MG: 2 INJECTION INTRAMUSCULAR; INTRAVENOUS at 12:26

## 2024-12-05 RX ADMIN — ACETAMINOPHEN 650 MG: 325 TABLET ORAL at 16:10

## 2024-12-05 RX ADMIN — KETOROLAC TROMETHAMINE 30 MG: 30 INJECTION, SOLUTION INTRAMUSCULAR at 15:05

## 2024-12-05 RX ADMIN — KETOROLAC TROMETHAMINE 30 MG: 30 INJECTION, SOLUTION INTRAMUSCULAR at 03:27

## 2024-12-05 RX ADMIN — SODIUM CHLORIDE, PRESERVATIVE FREE 10 ML: 5 INJECTION INTRAVENOUS at 09:19

## 2024-12-05 RX ADMIN — KETOROLAC TROMETHAMINE 30 MG: 30 INJECTION, SOLUTION INTRAMUSCULAR at 21:07

## 2024-12-05 RX ADMIN — SIMETHICONE 80 MG: 80 TABLET, CHEWABLE ORAL at 14:29

## 2024-12-05 RX ADMIN — SODIUM CHLORIDE: 9 INJECTION, SOLUTION INTRAVENOUS at 03:27

## 2024-12-05 RX ADMIN — ENOXAPARIN SODIUM 40 MG: 100 INJECTION SUBCUTANEOUS at 09:08

## 2024-12-05 ASSESSMENT — PAIN DESCRIPTION - LOCATION
LOCATION: ABDOMEN

## 2024-12-05 ASSESSMENT — PAIN DESCRIPTION - ORIENTATION
ORIENTATION: MID
ORIENTATION: MID
ORIENTATION: LEFT

## 2024-12-05 ASSESSMENT — PAIN - FUNCTIONAL ASSESSMENT
PAIN_FUNCTIONAL_ASSESSMENT: PREVENTS OR INTERFERES WITH MANY ACTIVE NOT PASSIVE ACTIVITIES
PAIN_FUNCTIONAL_ASSESSMENT: PREVENTS OR INTERFERES WITH MANY ACTIVE NOT PASSIVE ACTIVITIES
PAIN_FUNCTIONAL_ASSESSMENT: PREVENTS OR INTERFERES WITH ALL ACTIVE AND SOME PASSIVE ACTIVITIES
PAIN_FUNCTIONAL_ASSESSMENT: PREVENTS OR INTERFERES WITH MANY ACTIVE NOT PASSIVE ACTIVITIES

## 2024-12-05 ASSESSMENT — PAIN SCALES - GENERAL
PAINLEVEL_OUTOF10: 9
PAINLEVEL_OUTOF10: 7
PAINLEVEL_OUTOF10: 9
PAINLEVEL_OUTOF10: 6
PAINLEVEL_OUTOF10: 5
PAINLEVEL_OUTOF10: 8
PAINLEVEL_OUTOF10: 0

## 2024-12-05 ASSESSMENT — PAIN DESCRIPTION - ONSET
ONSET: ON-GOING

## 2024-12-05 ASSESSMENT — PAIN DESCRIPTION - PAIN TYPE
TYPE: SURGICAL PAIN
TYPE: SURGICAL PAIN

## 2024-12-05 ASSESSMENT — PAIN DESCRIPTION - DESCRIPTORS
DESCRIPTORS: OTHER (COMMENT)
DESCRIPTORS: SORE

## 2024-12-05 ASSESSMENT — PAIN SCALES - WONG BAKER
WONGBAKER_NUMERICALRESPONSE: NO HURT
WONGBAKER_NUMERICALRESPONSE: NO HURT

## 2024-12-05 ASSESSMENT — PAIN DESCRIPTION - FREQUENCY
FREQUENCY: CONTINUOUS

## 2024-12-05 NOTE — PROGRESS NOTES
NAME:  Sabina Rios  YOB: 1965  MEDICAL RECORD NUMBER:  [id number]    Shift Summary: Received patient post op from OR. Recovered patient in ICU. Patient having a lot of pain midline abdomen. PRN dose of ativan given for anxiousness and tachypnea associated with pain. Ketamine gtt ordered for pain. Scheduled toradol ordered. Patient resting at a RASS -1 to -2 on ketamine infusion. Bedside handoff given to Brenna SCHWARZ.    Family updated: Yes:   Pat at bedside and updated    Rhythm: Normal Sinus Rhythm     Most recent vitals:   Visit Vitals  /66   Pulse 91   Temp 98.4 °F (36.9 °C)   Resp 17   Ht 1.626 m (5' 4\")   Wt 60.6 kg (133 lb 9.6 oz)   SpO2 96%   BMI 22.93 kg/m²           No data found.    No data found.      Respiratory support needed (if any):  - RA    Admission weight Weight - Scale: 64.9 kg (143 lb) (11/14/24 1328)    Today's weight    Wt Readings from Last 1 Encounters:   12/04/24 60.6 kg (133 lb 9.6 oz)        Scott need assessed each shift: YES -  - continue scott r/t - surgical patient  UOP >30ml/hr: YES  Last documented BM (in last 48 hrs):  Patient Vitals for the past 48 hrs:   Last BM (including prior to admit)   12/04/24 1555 12/04/24                Restraints (in use currently or dc'd in last 12 hrs): No    Order current and documentation up to date? N/a    Lines/Drains reviewed @ bedside.  Peripheral IV 12/04/24 Left Forearm (Active)   Number of days: 0       Peripheral IV 12/04/24 Left Antecubital (Active)   Number of days: 0       Urinary Catheter 12/04/24 2 Way (Active)   Number of days: 0         Drip rates at handoff:    sodium chloride      sodium chloride 75 mL/hr at 12/04/24 1807    ketamine (KETALAR) 500 mg in sodium chloride 0.9 % 100 mL infusion 0.125 mg/kg/hr (12/04/24 1618)       Lab Data:   CBC: No results for input(s): \"WBC\", \"HGB\", \"HCT\", \"MCV\", \"PLT\" in the last 72 hours.  BMP:  No results for input(s): \"NA\", \"K\", \"CO2\", \"BUN\", \"CREATININE\", \"MAGNESIUM\"

## 2024-12-05 NOTE — PLAN OF CARE
Problem: Discharge Planning  Goal: Discharge to home or other facility with appropriate resources  12/4/2024 2233 by Brenna Mir, RN  Outcome: Progressing  Flowsheets (Taken 12/4/2024 2233)  Discharge to home or other facility with appropriate resources:   Identify barriers to discharge with patient and caregiver   Identify discharge learning needs (meds, wound care, etc)   Refer to discharge planning if patient needs post-hospital services based on physician order or complex needs related to functional status, cognitive ability or social support system   Arrange for needed discharge resources and transportation as appropriate   Arrange for interpreters to assist at discharge as needed     Problem: Pain  Goal: Verbalizes/displays adequate comfort level or baseline comfort level  12/4/2024 2233 by Brenna Mir, RN  Outcome: Progressing  Flowsheets (Taken 12/4/2024 2233)  Verbalizes/displays adequate comfort level or baseline comfort level:   Encourage patient to monitor pain and request assistance   Administer analgesics based on type and severity of pain and evaluate response   Consider cultural and social influences on pain and pain management   Assess pain using appropriate pain scale   Implement non-pharmacological measures as appropriate and evaluate response   Notify Licensed Independent Practitioner if interventions unsuccessful or patient reports new pain     Problem: Safety - Adult  Goal: Free from fall injury  12/4/2024 2233 by Brenna Mir, RN  Outcome: Progressing  Flowsheets (Taken 12/4/2024 2233)  Free From Fall Injury: Instruct family/caregiver on patient safety     Problem: Skin/Tissue Integrity  Goal: Absence of new skin breakdown  Description: 1.  Monitor for areas of redness and/or skin breakdown  2.  Assess vascular access sites hourly  3.  Every 4-6 hours minimum:  Change oxygen saturation probe site  4.  Every 4-6 hours:  If on nasal continuous positive airway pressure, respiratory

## 2024-12-05 NOTE — PROGRESS NOTES
Occupational Therapy  Facility/Department: [address] ICU  Occupational Therapy Initial Assessment    Name: Sabina Rios  : 1965  MRN: [id number]  Date of Service: 2024    Discharge Recommendations:  24 hour supervision or assist, Home with assist PRN (may need initial  though may progress to PRN A)     Sabina Rios scored a 18/24 on the AM-PAC ADL Inpatient form.  At this time, no further OT is recommended upon discharge due to anticipated return to baseline function.  Recommend patient returns to prior setting with prior services.       Patient Diagnosis(es): The encounter diagnosis was Diverticulitis.  Past Medical History:  has a past medical history of Anesthesia, Arthritis, B12 deficiency, Bilateral hearing loss, Diverticulitis, Dysthymia, Family history of colon cancer, GERD (gastroesophageal reflux disease), Hiatal hernia, HLD (hyperlipidemia), Irritable bowel syndrome with constipation, and Tinnitus.  Past Surgical History:  has a past surgical history that includes Wrist surgery (Right, ); Breast surgery (); Tonsillectomy; Endometrial ablation; Colonoscopy (2018); Upper gastrointestinal endoscopy (N/A, 2024); sigmoidoscopy (N/A, 2024); Upper gastrointestinal endoscopy (N/A, 2024); and Sigmoid Colectomy (N/A, 2024).    Treatment Diagnosis: Decreased: ADLs, functional transfers/mobility      Assessment  Performance deficits / Impairments: Decreased functional mobility ;Decreased ADL status  Assessment: Pt is a 58 yo F who presented with Recurring diverticulitis of the descending and sigmoid colons and is s/p Laparoscopic converted to open resection of descending and sigmoid colon with coloproctostomy. At baseline, pt lives at home with her  where she is typically independent, works FT, drives. She is below baseline at this time d/t abdominal pain, but able to complete bed mobility mod Ax2, functional transfers min A and mobility short distance CGA

## 2024-12-05 NOTE — PROGRESS NOTES
time employment  Type of Occupation: Works : Tern.  Vision/Hearing  Vision  Vision: Within Functional Limits  Hearing  Hearing: Within functional limits    Cognition   Orientation  Overall Orientation Status: Within Functional Limits  Cognition  Overall Cognitive Status: WFL    Objective       Observation/Palpation  Observation: Abdominal binder in place.  Gross Assessment  AROM: Generally decreased, functional  Strength: Generally decreased, functional                   Bed mobility  Supine to Sit: Moderate assistance;2 Person assistance  Bed Mobility Comments: Bed slight elevated; to eob from sidelying.  Transfers  Sit to Stand: Minimal Assistance  Stand to Sit: Minimal Assistance  Comment: Transfers completed from eob, recliner with Walker : Min assist, cues for safe hand placement.  Slow/guarded, painful to/from upright stand.  Ambulation  Surface: Level tile  Device: Rolling Walker  Distance: Pt amb 4-5 steps eob to recliner, stand/amb several steps in place (after brief seated rest) with Walker CGA. No LE buckling/giving way; slow/very guarded.                AM-PAC - Mobility    AM-PAC Basic Mobility - Inpatient   How much help is needed turning from your back to your side while in a flat bed without using bedrails?: A Little  How much help is needed moving from lying on your back to sitting on the side of a flat bed without using bedrails?: A Lot  How much help is needed moving to and from a bed to a chair?: A Little  How much help is needed standing up from a chair using your arms?: A Little  How much help is needed walking in hospital room?: A Little  How much help is needed climbing 3-5 steps with a railing?: A Little  AM-[facility] Inpatient Mobility Raw Score : 17  AM-PAC Inpatient T-Scale Score : 42.13  Mobility Inpatient CMS 0-100% Score: 50.57  Mobility Inpatient CMS G-Code Modifier : CK         Goals  Short Term Goals  Time Frame for Short Term Goals: Upon d/c acute care setting.  Short Term Goal 1:  Bed Mob Min assist.  Short Term Goal 2: Transfers with/without assist device Supervision.  Short Term Goal 3: Amb wtih/without assist device 150-200' SBA/Supervision.  Short Term Goal 4: Stair Negotiation as approp.  Patient Goals   Patient Goals : Return home with .       Education  Patient Education  Education Given To: Patient;Family  Education Provided Comments: Role of PT, POC, Need to call for assist, Bed Mob/to eob via sidelying, Transfer Activities with Walker.  Education Method: Verbal  Barriers to Learning: None  Education Outcome: Verbalized understanding;Continued education needed      Therapy Time   Individual Concurrent Group Co-treatment   Time In  1300         Time Out  1330         Minutes  30                 Jennifer Dawkins, PT

## 2024-12-05 NOTE — CONSULTS
Discussed with Dr. Thayer.  Anesthesia running a ketamine drip.  Dr. Thayer will discuss with anesthesia as far as pain control and the duration of ketamine drip.  Therefore, will defer consult at this time.      Happy to help with any additional needs.

## 2024-12-05 NOTE — PROGRESS NOTES
NAME:  Sabina Rios  YOB: 1965  MEDICAL RECORD NUMBER:  [id number]    Shift Summary: Urine output marginal initially, 500ml NS bolus and increased IVF rate for additional liter.1.140L UOP this shift. Tolerating PO fluids. Ketamine increased once for improved pain control. Breakthrough shadowing noted to abd ML dressing. Lap incision dressings c/d/I. Increasing bowel sounds. Abdominal binder in place with splinting pillow. Up to chair with 2 RNs for contact guard assist. AM labs WNL.     Family updated: No    Rhythm: Normal Sinus Rhythm     Most recent vitals:   Visit Vitals  BP (!) 87/52   Pulse 89   Temp 99.6 °F (37.6 °C) (Oral)   Resp 24   Ht 1.626 m (5' 4\")   Wt 61.4 kg (135 lb 5.8 oz)   SpO2 99%   BMI 23.23 kg/m²           No data found.    No data found.      Respiratory support needed (if any):  - RA    Admission weight Weight - Scale: 64.9 kg (143 lb) (11/14/24 1328)    Today's weight    Wt Readings from Last 1 Encounters:   12/05/24 61.4 kg (135 lb 5.8 oz)        Scott need assessed each shift: YES -  - continue scott r/t - perioperative  UOP >30ml/hr: YES  Last documented BM (in last 48 hrs):  Patient Vitals for the past 48 hrs:   Last BM (including prior to admit)   12/04/24 1555 12/04/24 12/04/24 2000 12/04/24                Restraints (in use currently or dc'd in last 12 hrs): No    Order current and documentation up to date? No    Lines/Drains reviewed @ bedside.  Peripheral IV 12/04/24 Left Forearm (Active)   Number of days: 0       Peripheral IV 12/04/24 Left Antecubital (Active)   Number of days: 0       Urinary Catheter 12/04/24 2 Way (Active)   Number of days: 0         Drip rates at handoff:    sodium chloride 75 mL/hr at 12/05/24 0558    sodium chloride      ketamine (KETALAR) 500 mg in sodium chloride 0.9 % 100 mL infusion 0.25 mg/kg/hr (12/04/24 7899)       Lab Data:   CBC:   Recent Labs     12/05/24  0438   WBC 9.7   HGB 11.1*   HCT 33.3*   MCV 93.7        BMP:

## 2024-12-05 NOTE — PROGRESS NOTES
General and Vascular Surgery                                                           Daily Progress Note                                                             Claudio Estrella PA-C    Pt Name: Sabina Rios  Medical Record Number: [id number]  Date of Birth 1965   Today's Date: 12/5/2024    ASSESSMENT/PLAN  S/P (12/4/24): Laparoscopic converted to open resection of descending and sigmoid colon with coloproctostomy.   Pain controlled  WBC count WNL: 9.7  Hgb: 11.1  Sitting up in chair this AM  Denies any nausea or emesis  Denies any flatulence or BM  Continue full liquid diet as tolerated. Will slowly advance diet as bowel function and symptoms improve.  OK to D/C scott  PT/OT     EDUCATION  Patient educated about their illness/diagnosis, stated above, and all questions answered. We discussed the importance of nutrition, medications they are taking, and healthy lifestyle.  SUBJECTIVE  Sabina has improved from yesterday. Pain is well controlled. She has no nausea and no vomiting.  She has not passed flatus and has not had a bowel movement. She is tolerating full liquids. Current activity is ad annie    OBJECTIVE  VITALS:  height is 1.626 m (5' 4\") and weight is 61.4 kg (135 lb 5.8 oz). Her oral temperature is 99.7 °F (37.6 °C). Her blood pressure is 99/57 (abnormal) and her pulse is 71. Her respiration is 21 and oxygen saturation is 100%. VITALS:  BP (!) 99/57   Pulse 71   Temp 99.7 °F (37.6 °C) (Oral)   Resp 21   Ht 1.626 m (5' 4\")   Wt 61.4 kg (135 lb 5.8 oz)   LMP 03/07/2017   SpO2 100%   BMI 23.23 kg/m²   GENERAL: alert, no distress  LUNGS: clear to ausculation, without wheezes, rales or rhonci  HEART: normal rate and regular rhythm  ABDOMEN: soft, incisional tenderness, non-distended  EXTREMITY: no cyanosis, clubbing or edema  I/O last 3 completed shifts:  In: 4019 [P.O.:660; I.V.:2809.6; IV Piggyback:549.4]  Out: 1508 [Urine:1408;

## 2024-12-05 NOTE — OP NOTE
[facility]          [address] [place] [address]                            OPERATIVE REPORT      PATIENT NAME: VICKI LOWE             : 1965  MED REC NO: [id number]                      ROOM: [place] [address]  ACCOUNT NO: [id number]                       ADMIT DATE: 2024  PROVIDER: Naveen Thayer MD      DATE OF PROCEDURE:  2024    SURGEON:  Naveen Thayer MD    PREOPERATIVE DIAGNOSIS:  Recurring diverticulitis of the descending and sigmoid colons.    POSTOPERATIVE DIAGNOSIS:  Recurring diverticulitis of the descending and sigmoid colons.    PROCEDURE:  Laparoscopic converted to open resection of descending and sigmoid colon with coloproctostomy.    SPECIMEN:  Left colon.    ESTIMATED BLOOD LOSS:  Less than 50 mL.    COMPLICATIONS:  None.    DISPOSITION:  To Recovery in stable condition.    INDICATION:  Patient is a 59-year-old female who has been admitted multiple times with recurring diverticulitis of the descending colon as well as the more distal sigmoid colon.  Due to recurring episodes and increasing difficulty with treatment, she elects to proceed with resection today.    DESCRIPTION OF PROCEDURE:  Patient was brought to the operating room, placed supine.  General anesthesia, intubation performed.  A Gastelum catheter placed and she was repositioned in the lithotomy.  The abdomen was prepped and draped in a sterile fashion.  A lower midline incision was made and a hand assisted GelPort placed.  Two 5 mm trocars were also placed, 1 in the left upper quadrant, 1 in the right lower quadrant.  Under direct visualization, we now inspected the colon.  There were obvious inflammatory change and phlegmon in the left lower quadrant due to previous infection.  We were able to separate the small bowel and omentum away from this area using blunt dissection and then mobilized the sigmoid colon off the adhesions.  This was done with  stapled closed again with a GUY stapler.  The EEA stapler was now passed transanally up to the rectum.  The pin was brought through the anterior midline of the rectum, staying away from the staple line.  The stapler and anvil were then attached and fired.  Two complete anastomotic rings were noted.  We then irrigated into the pelvis and using a rigid sigmoidoscope insufflated the colon until it was distended.  No air bubbling was noted.  The irrigation was now withdrawn and the midline closed with 0 looped PDS.  The skin was closed with staples.  Anesthesia then performed a block, and the patient was transferred to Recovery in good condition.          DRAKE ERICKSON MD      D:  12/04/2024 17:24:29     T:  12/04/2024 23:19:25     JAVY/TORRI  Job #:  [id number]     Doc#:  [id number]

## 2024-12-05 NOTE — PLAN OF CARE
Problem: Discharge Planning  Goal: Discharge to home or other facility with appropriate resources  Outcome: Progressing  Flowsheets (Taken 12/5/2024 0800)  Discharge to home or other facility with appropriate resources:   Identify barriers to discharge with patient and caregiver   Arrange for needed discharge resources and transportation as appropriate     Problem: Pain  Goal: Verbalizes/displays adequate comfort level or baseline comfort level  Outcome: Progressing     Problem: Safety - Adult  Goal: Free from fall injury  Outcome: Progressing     Problem: Skin/Tissue Integrity  Goal: Absence of new skin breakdown  Description: 1.  Monitor for areas of redness and/or skin breakdown  2.  Assess vascular access sites hourly  3.  Every 4-6 hours minimum:  Change oxygen saturation probe site  4.  Every 4-6 hours:  If on nasal continuous positive airway pressure, respiratory therapy assess nares and determine need for appliance change or resting period.  Outcome: Progressing     Problem: ABCDS Injury Assessment  Goal: Absence of physical injury  Outcome: Progressing

## 2024-12-05 NOTE — PROGRESS NOTES
Rounded on Digna this morning concerning the ketamine infusion for pain control. Dose is appropriate and effective, pain control is acceptable to her. BP has been soft, but this is normal for her.  Will not change ketamine dose today. Plan to round again tomorrow and consider d/c the ketamine infusion then.

## 2024-12-06 LAB
ANION GAP SERPL CALCULATED.3IONS-SCNC: 4 MMOL/L (ref 3–16)
BASOPHILS # BLD: 0 K/UL (ref 0–0.2)
BASOPHILS NFR BLD: 0.5 %
BUN SERPL-MCNC: 4 MG/DL (ref 7–20)
CALCIUM SERPL-MCNC: 7.3 MG/DL (ref 8.3–10.6)
CHLORIDE SERPL-SCNC: 109 MMOL/L (ref 99–110)
CO2 SERPL-SCNC: 23 MMOL/L (ref 21–32)
CREAT SERPL-MCNC: 0.5 MG/DL (ref 0.6–1.1)
DEPRECATED RDW RBC AUTO: 13.9 % (ref 12.4–15.4)
EOSINOPHIL # BLD: 0.1 K/UL (ref 0–0.6)
EOSINOPHIL NFR BLD: 1.6 %
GFR SERPLBLD CREATININE-BSD FMLA CKD-EPI: >90 ML/MIN/{1.73_M2}
GLUCOSE SERPL-MCNC: 106 MG/DL (ref 70–99)
HCT VFR BLD AUTO: 32.5 % (ref 36–48)
HGB BLD-MCNC: 10.8 G/DL (ref 12–16)
LYMPHOCYTES # BLD: 1.5 K/UL (ref 1–5.1)
LYMPHOCYTES NFR BLD: 20.3 %
MCH RBC QN AUTO: 31.2 PG (ref 26–34)
MCHC RBC AUTO-ENTMCNC: 33.3 G/DL (ref 31–36)
MCV RBC AUTO: 93.8 FL (ref 80–100)
MONOCYTES # BLD: 0.6 K/UL (ref 0–1.3)
MONOCYTES NFR BLD: 8.6 %
NEUTROPHILS # BLD: 5.2 K/UL (ref 1.7–7.7)
NEUTROPHILS NFR BLD: 69 %
PLATELET # BLD AUTO: 246 K/UL (ref 135–450)
PMV BLD AUTO: 7.7 FL (ref 5–10.5)
POTASSIUM SERPL-SCNC: 3.8 MMOL/L (ref 3.5–5.1)
RBC # BLD AUTO: 3.46 M/UL (ref 4–5.2)
SODIUM SERPL-SCNC: 136 MMOL/L (ref 136–145)
WBC # BLD AUTO: 7.5 K/UL (ref 4–11)

## 2024-12-06 PROCEDURE — 94760 N-INVAS EAR/PLS OXIMETRY 1: CPT

## 2024-12-06 PROCEDURE — 80048 BASIC METABOLIC PNL TOTAL CA: CPT

## 2024-12-06 PROCEDURE — 6370000000 HC RX 637 (ALT 250 FOR IP): Performed by: SURGERY

## 2024-12-06 PROCEDURE — 97116 GAIT TRAINING THERAPY: CPT

## 2024-12-06 PROCEDURE — 2580000003 HC RX 258: Performed by: SURGERY

## 2024-12-06 PROCEDURE — 6370000000 HC RX 637 (ALT 250 FOR IP): Performed by: PHYSICIAN ASSISTANT

## 2024-12-06 PROCEDURE — 6360000002 HC RX W HCPCS: Performed by: SURGERY

## 2024-12-06 PROCEDURE — 97530 THERAPEUTIC ACTIVITIES: CPT

## 2024-12-06 PROCEDURE — 99024 POSTOP FOLLOW-UP VISIT: CPT | Performed by: PHYSICIAN ASSISTANT

## 2024-12-06 PROCEDURE — 6360000002 HC RX W HCPCS: Performed by: PHYSICIAN ASSISTANT

## 2024-12-06 PROCEDURE — 36415 COLL VENOUS BLD VENIPUNCTURE: CPT

## 2024-12-06 PROCEDURE — 2500000003 HC RX 250 WO HCPCS: Performed by: NURSE PRACTITIONER

## 2024-12-06 PROCEDURE — APPSS15 APP SPLIT SHARED TIME 0-15 MINUTES: Performed by: PHYSICIAN ASSISTANT

## 2024-12-06 PROCEDURE — APPNB15 APP NON BILLABLE TIME 0-15 MINS: Performed by: PHYSICIAN ASSISTANT

## 2024-12-06 PROCEDURE — 6370000000 HC RX 637 (ALT 250 FOR IP): Performed by: STUDENT IN AN ORGANIZED HEALTH CARE EDUCATION/TRAINING PROGRAM

## 2024-12-06 PROCEDURE — 85025 COMPLETE CBC W/AUTO DIFF WBC: CPT

## 2024-12-06 PROCEDURE — 2000000000 HC ICU R&B

## 2024-12-06 PROCEDURE — 2580000003 HC RX 258: Performed by: NURSE PRACTITIONER

## 2024-12-06 PROCEDURE — 97110 THERAPEUTIC EXERCISES: CPT

## 2024-12-06 RX ORDER — DICYCLOMINE HYDROCHLORIDE 10 MG/1
10 CAPSULE ORAL 3 TIMES DAILY PRN
Status: DISCONTINUED | OUTPATIENT
Start: 2024-12-06 | End: 2024-12-14 | Stop reason: HOSPADM

## 2024-12-06 RX ORDER — DIAZEPAM 5 MG/1
5 TABLET ORAL EVERY 6 HOURS PRN
Status: DISCONTINUED | OUTPATIENT
Start: 2024-12-06 | End: 2024-12-14 | Stop reason: HOSPADM

## 2024-12-06 RX ORDER — ACETAMINOPHEN 500 MG
1000 TABLET ORAL EVERY 6 HOURS SCHEDULED
Status: DISCONTINUED | OUTPATIENT
Start: 2024-12-06 | End: 2024-12-14 | Stop reason: HOSPADM

## 2024-12-06 RX ORDER — DIAZEPAM 5 MG/1
5 TABLET ORAL ONCE
Status: COMPLETED | OUTPATIENT
Start: 2024-12-06 | End: 2024-12-06

## 2024-12-06 RX ORDER — SODIUM CHLORIDE 9 MG/ML
INJECTION, SOLUTION INTRAVENOUS CONTINUOUS
Status: DISCONTINUED | OUTPATIENT
Start: 2024-12-06 | End: 2024-12-10

## 2024-12-06 RX ORDER — PROCHLORPERAZINE EDISYLATE 5 MG/ML
10 INJECTION INTRAMUSCULAR; INTRAVENOUS EVERY 6 HOURS PRN
Status: DISCONTINUED | OUTPATIENT
Start: 2024-12-06 | End: 2024-12-14 | Stop reason: HOSPADM

## 2024-12-06 RX ADMIN — DICYCLOMINE HYDROCHLORIDE 10 MG: 10 CAPSULE ORAL at 03:16

## 2024-12-06 RX ADMIN — PROCHLORPERAZINE EDISYLATE 10 MG: 5 INJECTION INTRAMUSCULAR; INTRAVENOUS at 16:08

## 2024-12-06 RX ADMIN — KETOROLAC TROMETHAMINE 30 MG: 30 INJECTION, SOLUTION INTRAMUSCULAR at 02:39

## 2024-12-06 RX ADMIN — SODIUM CHLORIDE 0.25 MG/KG/HR: 9 INJECTION, SOLUTION INTRAVENOUS at 02:42

## 2024-12-06 RX ADMIN — SODIUM CHLORIDE, PRESERVATIVE FREE 10 ML: 5 INJECTION INTRAVENOUS at 09:00

## 2024-12-06 RX ADMIN — ACETAMINOPHEN 1000 MG: 500 TABLET ORAL at 10:54

## 2024-12-06 RX ADMIN — ENOXAPARIN SODIUM 40 MG: 100 INJECTION SUBCUTANEOUS at 09:14

## 2024-12-06 RX ADMIN — DICYCLOMINE HYDROCHLORIDE 10 MG: 10 CAPSULE ORAL at 13:01

## 2024-12-06 RX ADMIN — ACETAMINOPHEN 1000 MG: 500 TABLET ORAL at 23:08

## 2024-12-06 RX ADMIN — SODIUM CHLORIDE: 9 INJECTION, SOLUTION INTRAVENOUS at 21:42

## 2024-12-06 RX ADMIN — SODIUM CHLORIDE, PRESERVATIVE FREE 10 ML: 5 INJECTION INTRAVENOUS at 21:09

## 2024-12-06 RX ADMIN — BUPROPION HYDROCHLORIDE 300 MG: 150 TABLET, EXTENDED RELEASE ORAL at 09:03

## 2024-12-06 RX ADMIN — DIAZEPAM 5 MG: 5 TABLET ORAL at 03:16

## 2024-12-06 RX ADMIN — KETOROLAC TROMETHAMINE 30 MG: 30 INJECTION, SOLUTION INTRAMUSCULAR at 15:14

## 2024-12-06 RX ADMIN — KETOROLAC TROMETHAMINE 30 MG: 30 INJECTION, SOLUTION INTRAMUSCULAR at 21:04

## 2024-12-06 RX ADMIN — ONDANSETRON 4 MG: 2 INJECTION INTRAMUSCULAR; INTRAVENOUS at 13:12

## 2024-12-06 RX ADMIN — SIMETHICONE 80 MG: 80 TABLET, CHEWABLE ORAL at 13:55

## 2024-12-06 RX ADMIN — SIMETHICONE 80 MG: 80 TABLET, CHEWABLE ORAL at 00:58

## 2024-12-06 RX ADMIN — LORAZEPAM 0.5 MG: 2 INJECTION INTRAMUSCULAR; INTRAVENOUS at 00:58

## 2024-12-06 RX ADMIN — KETOROLAC TROMETHAMINE 30 MG: 30 INJECTION, SOLUTION INTRAMUSCULAR at 08:57

## 2024-12-06 RX ADMIN — SIMETHICONE 80 MG: 80 TABLET, CHEWABLE ORAL at 07:52

## 2024-12-06 ASSESSMENT — PAIN DESCRIPTION - DESCRIPTORS
DESCRIPTORS: CRAMPING;SHARP
DESCRIPTORS: CRAMPING
DESCRIPTORS: CRAMPING

## 2024-12-06 ASSESSMENT — PAIN DESCRIPTION - PAIN TYPE
TYPE: SURGICAL PAIN

## 2024-12-06 ASSESSMENT — PAIN SCALES - GENERAL
PAINLEVEL_OUTOF10: 7
PAINLEVEL_OUTOF10: 7
PAINLEVEL_OUTOF10: 9
PAINLEVEL_OUTOF10: 9
PAINLEVEL_OUTOF10: 6
PAINLEVEL_OUTOF10: 9
PAINLEVEL_OUTOF10: 10
PAINLEVEL_OUTOF10: 9
PAINLEVEL_OUTOF10: 0
PAINLEVEL_OUTOF10: 9
PAINLEVEL_OUTOF10: 9

## 2024-12-06 ASSESSMENT — PAIN - FUNCTIONAL ASSESSMENT
PAIN_FUNCTIONAL_ASSESSMENT: PREVENTS OR INTERFERES WITH ALL ACTIVE AND SOME PASSIVE ACTIVITIES
PAIN_FUNCTIONAL_ASSESSMENT: PREVENTS OR INTERFERES SOME ACTIVE ACTIVITIES AND ADLS

## 2024-12-06 ASSESSMENT — PAIN DESCRIPTION - LOCATION
LOCATION: ABDOMEN
LOCATION: ABDOMEN
LOCATION: ABDOMEN;INCISION
LOCATION: ABDOMEN

## 2024-12-06 ASSESSMENT — PAIN DESCRIPTION - ORIENTATION
ORIENTATION: MID;LEFT
ORIENTATION: MID
ORIENTATION: MID;LEFT
ORIENTATION: MID

## 2024-12-06 ASSESSMENT — PAIN DESCRIPTION - FREQUENCY: FREQUENCY: CONTINUOUS

## 2024-12-06 ASSESSMENT — PAIN DESCRIPTION - ONSET: ONSET: ON-GOING

## 2024-12-06 NOTE — PROGRESS NOTES
NAME:  Sabina Rios  YOB: 1965  MEDICAL RECORD NUMBER:  [id number]    Shift Summary: Pt initially tolerating PO full liquid diet. Increased activity, ambulating in hallway w/ PT. Patient developed increase nausea and upper GI gas w/ belching, contributing to surgical site discomfort. Pt received PRN Bentyl, Mylicon and Zofran with continued c/o's uppder GI gas and nausea. PRN Compazine added for nausea. Scheduled Tylenol added for pain. PRN Valium added for anxiety to aide rest. Pt continues to use IS/flutter valve and has abdominal binder in place for surgical site support and using abd splinting pillow.    Family updated: Yes:   bedside most of shift, updated during rounds.     Rhythm: Sinus rhythm    Most recent vitals:   Visit Vitals  /71   Pulse 77   Temp 98.4 °F (36.9 °C) (Oral)   Resp 16   Ht 1.626 m (5' 4\")   Wt 61.7 kg (136 lb 0.4 oz)   SpO2 96%   BMI 23.35 kg/m²           No data found.    No data found.      Respiratory support needed (if any):  Room air                      Admission weight Weight - Scale: 64.9 kg (143 lb) (11/14/24 1328)    Today's weight    Wt Readings from Last 1 Encounters:   12/06/24 61.7 kg (136 lb 0.4 oz)        Scott need assessed each shift: YES -  - continue scott r/t - strict output monitoring  UOP >30ml/hr: YES  Last documented BM (in last 48 hrs):  Patient Vitals for the past 48 hrs:   Last BM (including prior to admit)   12/04/24 2000 12/04/24 12/05/24 0800 12/04/24 12/06/24 0800 12/04/24                Restraints (in use currently or dc'd in last 12 hrs): no  Order current and documentation up to date? no    Lines/Drains reviewed @ bedside.  Peripheral IV 12/04/24 Left Forearm (Active)   Number of days: 2       Peripheral IV 12/04/24 Left Antecubital (Active)   Number of days: 2       Urinary Catheter 12/04/24 2 Way (Active)   Number of days: 2         Drip rates at handoff:    sodium chloride      ketamine (KETALAR) 500 mg in sodium  chloride 0.9 % 100 mL infusion 0.25 mg/kg/hr (12/06/24 1822)       Lab Data:   CBC:   Recent Labs     12/05/24  0438 12/06/24  0737   WBC 9.7 7.5   HGB 11.1* 10.8*   HCT 33.3* 32.5*   MCV 93.7 93.8    246     BMP:    Recent Labs     12/05/24  0438 12/06/24  0737    136   K 4.4 3.8   CO2 20* 23   BUN 8 4*   CREATININE 0.6 0.5*     LIVR: No results for input(s): \"AST\", \"ALT\" in the last 72 hours.  PT/INR: No results for input(s): \"INR\" in the last 72 hours.    Invalid input(s): \"PROT\"  APTT: No results for input(s): \"APTT\" in the last 72 hours.  ABG: No results for input(s): \"PHART\", \"JBS0URO\", \"PO2ART\" in the last 72 hours.    Any consults during the shift? No    Any signed and held orders to be released?  No        4 Eyes Skin Assessment       The patient is being assessed for  Shift Handoff    I agree that at least one RN has performed a thorough Head to Toe Skin Assessment on the patient. ALL assessment sites listed below have been assessed.      Areas assessed by both nurses: Head, Face, Ears, Shoulders, Back, Chest, Arms, Elbows, Hands, Sacrum. Buttock, Coccyx, Ischium, Legs. Feet and Heels, and Under Medical Devices         Does the Patient have a Wound? No noted wound(s)    Wound Care Orders initiated by RN: No       Sonny Prevention initiated by RN: Yes    Pressure Injury (Stage 3,4, Unstageable, DTI, NWPT, and Complex wounds) if present, place Wound referral order by RN under : No    New Ostomies, if present place, Ostomy referral order under : No     Nurse 1 eSignature: Electronically signed by Lyudmila Huang RN on 12/6/24 at 6:57 PM EST    **SHARE this note so that the co-signing nurse can place an eSignature**    Nurse 2 eSignature: Electronically signed by Barbara Díaz RN on 12/6/24 at 8:45 PM EST

## 2024-12-06 NOTE — PROGRESS NOTES
General and Vascular Surgery                                                           Daily Progress Note                                                             Claudio Estrella PA-C    Pt Name: Sabina Rios  Medical Record Number: [id number]  Date of Birth 1965   Today's Date: 12/6/2024    ASSESSMENT/PLAN  S/P (12/4/24): Laparoscopic converted to open resection of descending and sigmoid colon with coloproctostomy.   Pain controlled  Up ambulating in hallway with PT.   WBC count WNL: 9.7--> 7.5  Hgb: 11.1 --> 10.8  Denies any nausea or emesis  Continue full liquid diet as tolerated. Will slowly advance diet as bowel function and symptoms improve.  PT/OT   Continue current therapy. Will continue to closely monitor and give further recommendations.     EDUCATION  Patient educated about their illness/diagnosis, stated above, and all questions answered. We discussed the importance of nutrition, medications they are taking, and healthy lifestyle.  SUBJECTIVE  Sabina has improved from yesterday. Pain is well controlled. She has no nausea and no vomiting.  She has not passed flatus and has not had a bowel movement. She is tolerating full liquids. Current activity is ad annie    OBJECTIVE  VITALS:  height is 1.626 m (5' 4\") and weight is 61.7 kg (136 lb 0.4 oz). Her oral temperature is 98.7 °F (37.1 °C). Her blood pressure is 107/51 (abnormal) and her pulse is 84. Her respiration is 21 and oxygen saturation is 95%. VITALS:  BP (!) 107/51   Pulse 84   Temp 98.7 °F (37.1 °C) (Oral)   Resp 21   Ht 1.626 m (5' 4\")   Wt 61.7 kg (136 lb 0.4 oz)   LMP 03/07/2017   SpO2 95%   BMI 23.35 kg/m²   GENERAL: alert, no distress  LUNGS: clear to ausculation, without wheezes, rales or rhonci  HEART: normal rate and regular rhythm  ABDOMEN: soft, incisional tenderness, non-distended  EXTREMITY: no cyanosis, clubbing or edema  I/O last 3 completed shifts:  In:  5628.1 [P.O.:1793; I.V.:3335.7; IV Piggyback:499.4]  Out: 5220 [Urine:5220]  I/O this shift:  In: 27.1 [I.V.:27.1]  Out: 1025 [Urine:1025]    LABS  Recent Labs     12/06/24  0737   WBC 7.5   HGB 10.8*   HCT 32.5*         K 3.8      CO2 23   BUN 4*   CREATININE 0.5*   CALCIUM 7.3*   CBC:   Lab Results   Component Value Date/Time    WBC 7.5 12/06/2024 07:37 AM    RBC 3.46 12/06/2024 07:37 AM    HGB 10.8 12/06/2024 07:37 AM    HCT 32.5 12/06/2024 07:37 AM    MCV 93.8 12/06/2024 07:37 AM    MCH 31.2 12/06/2024 07:37 AM    MCHC 33.3 12/06/2024 07:37 AM    RDW 13.9 12/06/2024 07:37 AM     12/06/2024 07:37 AM    MPV 7.7 12/06/2024 07:37 AM     CMP:    Lab Results   Component Value Date/Time     12/06/2024 07:37 AM    K 3.8 12/06/2024 07:37 AM     12/06/2024 07:37 AM    CO2 23 12/06/2024 07:37 AM    BUN 4 12/06/2024 07:37 AM    CREATININE 0.5 12/06/2024 07:37 AM    GFRAA >60 05/13/2022 07:04 AM    GFRAA >60 01/18/2011 04:42 PM    AGRATIO 1.6 10/27/2024 07:50 AM    LABGLOM >90 12/06/2024 07:37 AM    LABGLOM 85 04/01/2024 06:47 AM    GLUCOSE 106 12/06/2024 07:37 AM    CALCIUM 7.3 12/06/2024 07:37 AM    BILITOT 0.9 10/27/2024 07:50 AM    ALKPHOS 82 10/27/2024 07:50 AM    AST 15 10/27/2024 07:50 AM    ALT 9 10/27/2024 07:50 AM         Claudio Estrella PA-C   Electronically signed 12/6/2024 at 12:44 PM    As above  Stable overall POD 2  Some nausea this afternoon   Was ambulating earlier  Passing flatus, has tried some PO but now limited  Continue pain and nausea control  OOB when able    Electronically signed by DRAKE ERICKSON MD on 12/6/2024 at 3:10 PM

## 2024-12-06 NOTE — PROGRESS NOTES
Physical Therapy  Facility/Department: [address] ICU  Physical Therapy Treatment Note    Name: Sabina Rios  : 1965  MRN: [id number]  Date of Service: 2024    Discharge Recommendations:  Continue to assess pending progress (Anticipate d/c home with adequate assist/support for d/c home.)          Sabina Rios scored a 17/24 on the AM-PAC short mobility form.  At this time, no further PT is recommended upon discharge.  Assessment  Body Structures, Functions, Activity Limitations Requiring Skilled Therapeutic Intervention: Decreased functional mobility ;Decreased endurance;Increased pain  Assessment: 58 y/o female admit 2024 with Recurring Diverticulitis of Descending and Sigmoid Colons. 2024 S/P Lap converted to Open Resection of Descending and Sigmoid Colon with Coloproctostomy. PMH Recurring Diverticulitis, IBS, R Wrist Surg.  PTA pt living with  in house with few steps to enter and 1st floor bed/bath; independent daily care and functional mobility (working here at [facility] [employer] [facility] [employer]).  Pt currently requiring Mod assist to Roll for place/repositioning of abd binder and Mod assist moving to eob from sidelying. Transfers/Amb further functional distances with Walker CGA; slow/guarded although no specific LOB.  Requiring increase time/rest breaks with functional activities.  Pt/family appreciative of care.    At this time, anticipate d/c home with adequate family assist/support.  Will cont to monitor pt's progress.  Therapy Prognosis: Good  History: 58 y/o female admit 2024 with Recurring Diverticulitis of Descending and Sigmoid Colons. 2024 S/P Lap converted to Open Resection of Descending and Sigmoid Colon with Coloproctostomy. PMH Recurring Diverticulitis, IBS, R Wrist Surg.  Exam: See above.  Clinical Presentation: See above.  Barriers to Learning: None.  Requires PT Follow-Up: Yes  Activity Tolerance  Activity Tolerance: Patient tolerated treatment well;Patient limited by

## 2024-12-06 NOTE — PROGRESS NOTES
Assessment completed. Pt w/ surgical site pain and cramping/gas pains. PRN dose Mylicon and scheduled Toradol administered see eMAR. Communicated with general surgery, orders received for scheduled PO Tylenol and heating pad.  IS and splinting education review. Pt return demonstration. Abdominal binder in place. Repositioned and secured w/ PT bedside prior to getting OOB and ambulation. Pt ambulated in hallway w/ PT, see PT notes. Pt up in recliner chair after ambulating in hallway.  Pt tolerating liquids without nausea but some cramping pain after drinking. Pt reports passing flatus. Active bowel sounds all quadrants.   at bedside. Call light in reach.

## 2024-12-06 NOTE — PROGRESS NOTES
\"ALT\" in the last 72 hours.  PT/INR: No results for input(s): \"INR\" in the last 72 hours.    Invalid input(s): \"PROT\"  APTT: No results for input(s): \"APTT\" in the last 72 hours.  ABG: No results for input(s): \"PHART\", \"NDN4WVY\", \"PO2ART\" in the last 72 hours.    Any consults during the shift? No    Any signed and held orders to be released?  No        4 Eyes Skin Assessment       The patient is being assessed for  Shift Handoff    I agree that at least one RN has performed a thorough Head to Toe Skin Assessment on the patient. ALL assessment sites listed below have been assessed.      Areas assessed by both nurses: Head, Face, Ears, Shoulders, Back, Chest, Arms, Elbows, Hands, Sacrum. Buttock, Coccyx, Ischium, Legs. Feet and Heels, and Under Medical Devices         Does the Patient have a Wound? Yes wound(s) were present on assessment. LDA wound assessment was Initiated and completed by RN    Wound Care Orders initiated by RN: Yes       Sonny Prevention initiated by RN: Yes    Pressure Injury (Stage 3,4, Unstageable, DTI, NWPT, and Complex wounds) if present, place Wound referral order by RN under : No    New Ostomies, if present place, Ostomy referral order under : No     Nurse 1 eSignature: Electronically signed by Heavenly Caruso RN on 12/5/24 at 7:32 PM EST    **SHARE this note so that the co-signing nurse can place an eSignature**    Nurse 2 eSignature: Electronically signed by SALAS CASTELLANOS RN on 12/5/24 at 7:35 PM EST

## 2024-12-06 NOTE — PROGRESS NOTES
NAME:  Sabina Rios  YOB: 1965  MEDICAL RECORD NUMBER:  [id number]    Shift Summary: Increased pain this shift and poor sleep. +flatus. Cramping pain is persistent. 1x Valium given. Home dose bentyl ordered. Was able to rest from 7890-2777. Adequate UOP. Tolerating PO fluids. Surgical dressing unchanged. Splinting and working with IS.     Family updated: No    Rhythm: Normal Sinus Rhythm     Most recent vitals:   Visit Vitals  /64   Pulse 83   Temp 99.6 °F (37.6 °C) (Axillary)   Resp 17   Ht 1.626 m (5' 4\")   Wt 61.7 kg (136 lb 0.4 oz)   SpO2 97%   BMI 23.35 kg/m²           No data found.    No data found.      Respiratory support needed (if any):  - RA    Admission weight Weight - Scale: 64.9 kg (143 lb) (11/14/24 1328)    Today's weight    Wt Readings from Last 1 Encounters:   12/05/24 61.4 kg (135 lb 5.8 oz)        Scott need assessed each shift: YES -  - continue scott r/t - mobility  UOP >30ml/hr: YES  Last documented BM (in last 48 hrs):  Patient Vitals for the past 48 hrs:   Last BM (including prior to admit)   12/04/24 1555 12/04/24 12/04/24 2000 12/04/24 12/05/24 0800 12/04/24                Restraints (in use currently or dc'd in last 12 hrs): No    Order current and documentation up to date? No    Lines/Drains reviewed @ bedside.  Peripheral IV 12/04/24 Left Forearm (Active)   Number of days: 1       Peripheral IV 12/04/24 Left Antecubital (Active)   Number of days: 1       Urinary Catheter 12/04/24 2 Way (Active)   Number of days: 1         Drip rates at handoff:    sodium chloride      ketamine (KETALAR) 500 mg in sodium chloride 0.9 % 100 mL infusion 0.25 mg/kg/hr (12/06/24 0543)       Lab Data:   CBC:   Recent Labs     12/05/24 0438   WBC 9.7   HGB 11.1*   HCT 33.3*   MCV 93.7        BMP:    Recent Labs     12/05/24  0438      K 4.4   CO2 20*   BUN 8   CREATININE 0.6     LIVR: No results for input(s): \"AST\", \"ALT\" in the last 72 hours.  PT/INR: No results for  input(s): \"INR\" in the last 72 hours.    Invalid input(s): \"PROT\"  APTT: No results for input(s): \"APTT\" in the last 72 hours.  ABG: No results for input(s): \"PHART\", \"HFV4VFR\", \"PO2ART\" in the last 72 hours.    Any consults during the shift? No    Any signed and held orders to be released?  No        4 Eyes Skin Assessment       The patient is being assessed for  Shift Handoff    I agree that at least one RN has performed a thorough Head to Toe Skin Assessment on the patient. ALL assessment sites listed below have been assessed.      Areas assessed by both nurses: Head, Face, Ears, Shoulders, Back, Chest, Arms, Elbows, Hands, Sacrum. Buttock, Coccyx, Ischium, Legs. Feet and Heels, and Under Medical Devices         Does the Patient have a Wound? No noted wound(s)    Wound Care Orders initiated by RN: No       Sonny Prevention initiated by RN: Yes    Pressure Injury (Stage 3,4, Unstageable, DTI, NWPT, and Complex wounds) if present, place Wound referral order by RN under : No    New Ostomies, if present place, Ostomy referral order under : No     Nurse 1 eSignature: Electronically signed by SALAS CASTELLANOS RN on 12/6/24 at 7:55 AM EST    **SHARE this note so that the co-signing nurse can place an eSignature**    Nurse 2 eSignature: Electronically signed by Lyudmila Huang RN on 12/6/24 at 7:50 AM EST

## 2024-12-06 NOTE — PROGRESS NOTES
Rounded on Digna this morning concerning the ketamine infusion for pain control. She did have increased pain overnight with difficulty sleeping but was able to sleep after prn Valium. Dose is appropriate and effective otherwise.Will not change ketamine dose today. Will consider discontinuing tomorrow.

## 2024-12-06 NOTE — PROGRESS NOTES
@ 1747 Pt asking to go back to bed, c/o nausea and feeling exhausted. Discussed activity to help GI track motility. Assisted pt to ambulate in room 50 feet then assisted back to bed. Pt declining scheduled Tylenol at this time d/t nausea/gas. Pt's family bedside.  @ 1815 Pt sleeping quietly in bed.

## 2024-12-06 NOTE — PROGRESS NOTES
Pt c/o abdominal cramping, PRN Bentyl PO administered @ 1301, see eMAR  Pt's  came to desk stating pt nauseous and asking for Zofran. Arrived bedside, pt w/ belching, heaving and nausea; PRN Zofran IVP given @ 1312. Pt requesting to go back to bed. Assisted back to bed @ 1326, increased belching w/ movement noted. Pt splinting w/ coughing and heaving. BS remain active all 4 quadrants, pt reports still passing flatus.  Dr Thayer arrived bedside. Status reviewed. No dressing change at this time.  @ 1355 No improvement, PRN Mylicon given, see eMAR.  Pt's  remains bedside.

## 2024-12-06 NOTE — PLAN OF CARE
Problem: Discharge Planning  Goal: Discharge to home or other facility with appropriate resources  12/5/2024 2033 by Brenna Mir, RN  Outcome: Progressing  Flowsheets (Taken 12/5/2024 2033)  Discharge to home or other facility with appropriate resources:   Identify barriers to discharge with patient and caregiver   Identify discharge learning needs (meds, wound care, etc)   Refer to discharge planning if patient needs post-hospital services based on physician order or complex needs related to functional status, cognitive ability or social support system   Arrange for needed discharge resources and transportation as appropriate     Problem: Pain  Goal: Verbalizes/displays adequate comfort level or baseline comfort level  12/5/2024 2033 by Brenna Mir, RN  Outcome: Progressing  Flowsheets (Taken 12/5/2024 2033)  Verbalizes/displays adequate comfort level or baseline comfort level:   Encourage patient to monitor pain and request assistance   Administer analgesics based on type and severity of pain and evaluate response   Consider cultural and social influences on pain and pain management   Assess pain using appropriate pain scale   Notify Licensed Independent Practitioner if interventions unsuccessful or patient reports new pain   Implement non-pharmacological measures as appropriate and evaluate response     Problem: Safety - Adult  Goal: Free from fall injury  12/5/2024 2033 by Brenna Mir, RN  Outcome: Progressing  Flowsheets (Taken 12/5/2024 2033)  Free From Fall Injury: Instruct family/caregiver on patient safety     Problem: Skin/Tissue Integrity  Goal: Absence of new skin breakdown  Description: 1.  Monitor for areas of redness and/or skin breakdown  2.  Assess vascular access sites hourly  3.  Every 4-6 hours minimum:  Change oxygen saturation probe site  4.  Every 4-6 hours:  If on nasal continuous positive airway pressure, respiratory therapy assess nares and determine need for appliance change

## 2024-12-06 NOTE — PROGRESS NOTES
Pt with continued nausea, belching. Message sent to general surgery. Claudio SIMONS in department @ 6368 discussed nausea. New orders received.  @ 1606 PRN dose Compazine IVP administered see eMAR. Pt assisted up to chair.   Dr Thayer to bedside, status reviewed.

## 2024-12-07 LAB
ANION GAP SERPL CALCULATED.3IONS-SCNC: 6 MMOL/L (ref 3–16)
BASOPHILS # BLD: 0 K/UL (ref 0–0.2)
BASOPHILS NFR BLD: 0.4 %
BUN SERPL-MCNC: 6 MG/DL (ref 7–20)
CALCIUM SERPL-MCNC: 7 MG/DL (ref 8.3–10.6)
CHLORIDE SERPL-SCNC: 111 MMOL/L (ref 99–110)
CO2 SERPL-SCNC: 20 MMOL/L (ref 21–32)
CREAT SERPL-MCNC: 0.5 MG/DL (ref 0.6–1.1)
DEPRECATED RDW RBC AUTO: 13.6 % (ref 12.4–15.4)
EOSINOPHIL # BLD: 0.2 K/UL (ref 0–0.6)
EOSINOPHIL NFR BLD: 3.3 %
GFR SERPLBLD CREATININE-BSD FMLA CKD-EPI: >90 ML/MIN/{1.73_M2}
GLUCOSE SERPL-MCNC: 84 MG/DL (ref 70–99)
HCT VFR BLD AUTO: 29.8 % (ref 36–48)
HGB BLD-MCNC: 10 G/DL (ref 12–16)
LYMPHOCYTES # BLD: 1.3 K/UL (ref 1–5.1)
LYMPHOCYTES NFR BLD: 20.2 %
MCH RBC QN AUTO: 31.4 PG (ref 26–34)
MCHC RBC AUTO-ENTMCNC: 33.5 G/DL (ref 31–36)
MCV RBC AUTO: 93.9 FL (ref 80–100)
MONOCYTES # BLD: 0.6 K/UL (ref 0–1.3)
MONOCYTES NFR BLD: 9.2 %
NEUTROPHILS # BLD: 4.4 K/UL (ref 1.7–7.7)
NEUTROPHILS NFR BLD: 66.9 %
PLATELET # BLD AUTO: 234 K/UL (ref 135–450)
PMV BLD AUTO: 7.6 FL (ref 5–10.5)
POTASSIUM SERPL-SCNC: 3.6 MMOL/L (ref 3.5–5.1)
RBC # BLD AUTO: 3.17 M/UL (ref 4–5.2)
SODIUM SERPL-SCNC: 137 MMOL/L (ref 136–145)
WBC # BLD AUTO: 6.7 K/UL (ref 4–11)

## 2024-12-07 PROCEDURE — 2000000000 HC ICU R&B

## 2024-12-07 PROCEDURE — 97116 GAIT TRAINING THERAPY: CPT

## 2024-12-07 PROCEDURE — 6360000002 HC RX W HCPCS: Performed by: PHYSICIAN ASSISTANT

## 2024-12-07 PROCEDURE — 85025 COMPLETE CBC W/AUTO DIFF WBC: CPT

## 2024-12-07 PROCEDURE — 2580000003 HC RX 258: Performed by: SURGERY

## 2024-12-07 PROCEDURE — 6370000000 HC RX 637 (ALT 250 FOR IP): Performed by: ANESTHESIOLOGY

## 2024-12-07 PROCEDURE — 6370000000 HC RX 637 (ALT 250 FOR IP): Performed by: PHYSICIAN ASSISTANT

## 2024-12-07 PROCEDURE — 6360000002 HC RX W HCPCS: Performed by: SURGERY

## 2024-12-07 PROCEDURE — 2500000003 HC RX 250 WO HCPCS: Performed by: SURGERY

## 2024-12-07 PROCEDURE — 94760 N-INVAS EAR/PLS OXIMETRY 1: CPT

## 2024-12-07 PROCEDURE — 6370000000 HC RX 637 (ALT 250 FOR IP): Performed by: SURGERY

## 2024-12-07 PROCEDURE — 97530 THERAPEUTIC ACTIVITIES: CPT

## 2024-12-07 PROCEDURE — 6370000000 HC RX 637 (ALT 250 FOR IP): Performed by: STUDENT IN AN ORGANIZED HEALTH CARE EDUCATION/TRAINING PROGRAM

## 2024-12-07 PROCEDURE — 36415 COLL VENOUS BLD VENIPUNCTURE: CPT

## 2024-12-07 PROCEDURE — 80048 BASIC METABOLIC PNL TOTAL CA: CPT

## 2024-12-07 RX ADMIN — ENOXAPARIN SODIUM 40 MG: 100 INJECTION SUBCUTANEOUS at 09:16

## 2024-12-07 RX ADMIN — KETAMINE HYDROCHLORIDE 0.12 MG/KG/HR: 50 INJECTION, SOLUTION INTRAMUSCULAR; INTRAVENOUS at 09:40

## 2024-12-07 RX ADMIN — SIMETHICONE 80 MG: 80 TABLET, CHEWABLE ORAL at 19:55

## 2024-12-07 RX ADMIN — DIAZEPAM 5 MG: 5 TABLET ORAL at 21:04

## 2024-12-07 RX ADMIN — PROCHLORPERAZINE EDISYLATE 10 MG: 5 INJECTION INTRAMUSCULAR; INTRAVENOUS at 23:35

## 2024-12-07 RX ADMIN — DICYCLOMINE HYDROCHLORIDE 10 MG: 10 CAPSULE ORAL at 20:41

## 2024-12-07 RX ADMIN — ACETAMINOPHEN 1000 MG: 500 TABLET ORAL at 12:04

## 2024-12-07 RX ADMIN — KETAMINE HYDROCHLORIDE 0.12 MG/KG/HR: 50 INJECTION, SOLUTION INTRAMUSCULAR; INTRAVENOUS at 11:13

## 2024-12-07 RX ADMIN — SIMETHICONE 80 MG: 80 TABLET, CHEWABLE ORAL at 15:14

## 2024-12-07 RX ADMIN — ONDANSETRON 4 MG: 2 INJECTION INTRAMUSCULAR; INTRAVENOUS at 08:37

## 2024-12-07 RX ADMIN — ACETAMINOPHEN 1000 MG: 500 TABLET ORAL at 18:06

## 2024-12-07 RX ADMIN — SODIUM CHLORIDE: 9 INJECTION, SOLUTION INTRAVENOUS at 07:25

## 2024-12-07 RX ADMIN — SODIUM CHLORIDE, PRESERVATIVE FREE 10 ML: 5 INJECTION INTRAVENOUS at 21:04

## 2024-12-07 RX ADMIN — KETOROLAC TROMETHAMINE 30 MG: 30 INJECTION, SOLUTION INTRAMUSCULAR at 20:41

## 2024-12-07 RX ADMIN — KETOROLAC TROMETHAMINE 30 MG: 30 INJECTION, SOLUTION INTRAMUSCULAR at 02:57

## 2024-12-07 RX ADMIN — ACETAMINOPHEN 1000 MG: 500 TABLET ORAL at 05:24

## 2024-12-07 RX ADMIN — KETOROLAC TROMETHAMINE 30 MG: 30 INJECTION, SOLUTION INTRAMUSCULAR at 15:14

## 2024-12-07 RX ADMIN — SODIUM CHLORIDE, PRESERVATIVE FREE 10 ML: 5 INJECTION INTRAVENOUS at 08:38

## 2024-12-07 RX ADMIN — BUPROPION HYDROCHLORIDE 300 MG: 150 TABLET, EXTENDED RELEASE ORAL at 09:16

## 2024-12-07 RX ADMIN — KETOROLAC TROMETHAMINE 30 MG: 30 INJECTION, SOLUTION INTRAMUSCULAR at 09:16

## 2024-12-07 RX ADMIN — SODIUM CHLORIDE: 9 INJECTION, SOLUTION INTRAVENOUS at 17:04

## 2024-12-07 ASSESSMENT — PAIN DESCRIPTION - ONSET
ONSET: ON-GOING

## 2024-12-07 ASSESSMENT — PAIN SCALES - GENERAL
PAINLEVEL_OUTOF10: 5
PAINLEVEL_OUTOF10: 8
PAINLEVEL_OUTOF10: 5
PAINLEVEL_OUTOF10: 0
PAINLEVEL_OUTOF10: 0
PAINLEVEL_OUTOF10: 5
PAINLEVEL_OUTOF10: 6
PAINLEVEL_OUTOF10: 6
PAINLEVEL_OUTOF10: 8
PAINLEVEL_OUTOF10: 7
PAINLEVEL_OUTOF10: 8
PAINLEVEL_OUTOF10: 6
PAINLEVEL_OUTOF10: 8
PAINLEVEL_OUTOF10: 0
PAINLEVEL_OUTOF10: 7
PAINLEVEL_OUTOF10: 5
PAINLEVEL_OUTOF10: 0
PAINLEVEL_OUTOF10: 0

## 2024-12-07 ASSESSMENT — PAIN DESCRIPTION - FREQUENCY
FREQUENCY: CONTINUOUS

## 2024-12-07 ASSESSMENT — PAIN DESCRIPTION - LOCATION
LOCATION: ABDOMEN

## 2024-12-07 ASSESSMENT — PAIN - FUNCTIONAL ASSESSMENT

## 2024-12-07 ASSESSMENT — PAIN DESCRIPTION - ORIENTATION
ORIENTATION: MID
ORIENTATION: MID;LOWER
ORIENTATION: MID;UPPER
ORIENTATION: MID

## 2024-12-07 ASSESSMENT — PAIN DESCRIPTION - PAIN TYPE
TYPE: SURGICAL PAIN
TYPE: CHRONIC PAIN
TYPE: SURGICAL PAIN

## 2024-12-07 ASSESSMENT — PAIN DESCRIPTION - DESCRIPTORS
DESCRIPTORS: CRAMPING

## 2024-12-07 NOTE — PROGRESS NOTES
Progress Note  Date:2024       Room:[address]  Patient Name:Sabina Rios     YOB: 1965     Age:59 y.o.        Subjective    Subjective:  Symptoms:  Improved.    Diet:  No nausea or vomiting.    Activity level: Impaired due to weakness.    Pain:  She complains of pain that is mild.       Review of Systems   Gastrointestinal:  Negative for nausea and vomiting.     Objective         Vitals Last 24 Hours:  TEMPERATURE:  Temp  Av.4 °F (36.9 °C)  Min: 98.3 °F (36.8 °C)  Max: 98.5 °F (36.9 °C)  RESPIRATIONS RANGE: Resp  Av.6  Min: 13  Max: 29  PULSE OXIMETRY RANGE: SpO2  Av.6 %  Min: 93 %  Max: 97 %  PULSE RANGE: Pulse  Av.3  Min: 67  Max: 89  BLOOD PRESSURE RANGE: Systolic (24hrs), Av , Min:95 , Max:134   ; Diastolic (24hrs), Av, Min:47, Max:71    I/O (24Hr):    Intake/Output Summary (Last 24 hours) at 2024 1034  Last data filed at 2024 1010  Gross per 24 hour   Intake 1839.63 ml   Output 2207 ml   Net -367.37 ml     Objective  Labs/Imaging/Diagnostics    Labs:  CBC:  Recent Labs     24  0737 24  0427   WBC 9.7 7.5 6.7   RBC 3.55* 3.46* 3.17*   HGB 11.1* 10.8* 10.0*   HCT 33.3* 32.5* 29.8*   MCV 93.7 93.8 93.9   RDW 13.9 13.9 13.6    246 234     CHEMISTRIES:  Recent Labs     24  0737 24  0427    136 137   K 4.4 3.8 3.6   * 109 111*   CO2 20* 23 20*   BUN 8 4* 6*   CREATININE 0.6 0.5* 0.5*   GLUCOSE 109* 106* 84     PT/INR:No results for input(s): \"PROTIME\", \"INR\" in the last 72 hours.  APTT:No results for input(s): \"APTT\" in the last 72 hours.  LIVER PROFILE:No results for input(s): \"AST\", \"ALT\", \"BILIDIR\", \"BILITOT\", \"ALKPHOS\" in the last 72 hours.    Imaging Last 24 Hours:  No results found.  Assessment//Plan           Hospital Problems             Last Modified POA    * (Principal) Diverticulitis 2019 Unknown    Diverticulitis large intestine w/o perforation or abscess w/o  bleeding 12/4/2024 Yes     Assessment & Plan    POD 3 from sigmoid colectomy   Nausea from yesterday has improved   Feels overly sedated   Will wean Ketamine to half today   Monitor pain   Control nausea   Liquid diet for now    Electronically signed by DRAKE ERICKSON MD on 12/7/2024 at 10:35 AM    Electronically signed by DRAKE ERICKSON MD on 12/7/24 at 10:34 AM EST

## 2024-12-07 NOTE — PROGRESS NOTES
Low urine output.  Dr Thayer informed. Order to start IVF received.  Electronically signed by Barbara Díaz RN on 12/6/2024 at 9:36 PM

## 2024-12-07 NOTE — PLAN OF CARE
Problem: Discharge Planning  Goal: Discharge to home or other facility with appropriate resources  12/7/2024 0015 by Barbara Díaz RN  Outcome: Progressing  Flowsheets (Taken 12/6/2024 2000)  Discharge to home or other facility with appropriate resources:   Identify barriers to discharge with patient and caregiver   Arrange for needed discharge resources and transportation as appropriate   Identify discharge learning needs (meds, wound care, etc)   Refer to discharge planning if patient needs post-hospital services based on physician order or complex needs related to functional status, cognitive ability or social support system     Problem: Pain  Goal: Verbalizes/displays adequate comfort level or baseline comfort level  12/7/2024 0015 by Barbara Díaz RN  Outcome: Progressing     Problem: Safety - Adult  Goal: Free from fall injury  12/7/2024 0015 by Barbara Díaz RN  Outcome: Progressing     Problem: Skin/Tissue Integrity  Goal: Absence of new skin breakdown  Description: 1.  Monitor for areas of redness and/or skin breakdown  2.  Assess vascular access sites hourly  3.  Every 4-6 hours minimum:  Change oxygen saturation probe site  4.  Every 4-6 hours:  If on nasal continuous positive airway pressure, respiratory therapy assess nares and determine need for appliance change or resting period.  12/7/2024 0015 by Barbara Díaz RN  Outcome: Progressing     Problem: ABCDS Injury Assessment  Goal: Absence of physical injury  12/7/2024 0015 by Barbara Díaz RN  Outcome: Progressing     Problem: Skin/Tissue Integrity - Adult  Goal: Incisions, wounds, or drain sites healing without S/S of infection  12/7/2024 0015 by Barbara Díaz RN  Outcome: Progressing     Problem: Gastrointestinal - Adult  Goal: Minimal or absence of nausea and vomiting  12/7/2024 0015 by Barbara Díaz RN  Outcome: Progressing  Flowsheets (Taken 12/6/2024 2000)  Minimal or absence of nausea and vomiting:   Administer ordered antiemetic

## 2024-12-07 NOTE — PROGRESS NOTES
NAME:  Sabina Rios  YOB: 1965  MEDICAL RECORD NUMBER:  [id number]    Shift Summary: Uneventful night.  Pt was able to rest and sleep tonight. + flatus. + bowel sounds x 4 quads. No nausea or belching.  Abd cramping with activity/repositioning but did not require any PRN meds.  Tolerated bathing and linen change. Call light within reach.    Family updated: No    Rhythm: Normal Sinus Rhythm     Most recent vitals:   Visit Vitals  /68   Pulse 67   Temp 98.5 °F (36.9 °C) (Oral)   Resp 13   Ht 1.626 m (5' 4\")   Wt 62.3 kg (137 lb 5.6 oz)   SpO2 97%   BMI 23.58 kg/m²           No data found.    No data found.      Respiratory support needed (if any):  - RA    Admission weight Weight - Scale: 64.9 kg (143 lb) (11/14/24 1328)    Today's weight    Wt Readings from Last 1 Encounters:   12/07/24 62.3 kg (137 lb 5.6 oz)        Scott need assessed each shift: YES -  - continue scott r/t - strict intake and output  UOP >30ml/hr: YES  Last documented BM (in last 48 hrs):  Patient Vitals for the past 48 hrs:   Last BM (including prior to admit)   12/05/24 0800 12/04/24 12/06/24 0800 12/04/24 12/06/24 2000 12/04/24                Restraints (in use currently or dc'd in last 12 hrs): No    Order current and documentation up to date? No    Lines/Drains reviewed @ bedside.  Peripheral IV 12/04/24 Left Forearm (Active)   Number of days: 2       Peripheral IV 12/04/24 Left Antecubital (Active)   Number of days: 2       Urinary Catheter 12/04/24 2 Way (Active)   Number of days: 2         Drip rates at handoff:    sodium chloride 100 mL/hr at 12/07/24 0603    sodium chloride      ketamine (KETALAR) 500 mg in sodium chloride 0.9 % 100 mL infusion 0.25 mg/kg/hr (12/07/24 0603)       Lab Data:   CBC:   Recent Labs     12/06/24  0737 12/07/24  0427   WBC 7.5 6.7   HGB 10.8* 10.0*   HCT 32.5* 29.8*   MCV 93.8 93.9    234     BMP:    Recent Labs     12/06/24  0737 12/07/24  0427    137   K 3.8 3.6   CO2

## 2024-12-07 NOTE — PROGRESS NOTES
Comprehensive Nutrition Assessment    Type and Reason for Visit:  Positive nutrition screen    Nutrition Recommendations/Plan:   Continue current diet.  Increase supplement to TID.     Malnutrition Assessment:  Malnutrition Status:  No malnutrition (12/07/24 1149)    Context:  Acute Illness       Nutrition Assessment:    MST=3. Pt. admitted for diverticulitis. Currently receiving a full liquid diet. Diet acceptance TBD, Pt. ranging 1-1.4 L PO intake. Supplements include Ensure clear BID. Supplement acceptance TBD. She is s/p  open sigmoid colectomy w/ coloproctostomy on 12/4. Nausea is improved from yesterday per notes. Recommend to continue Ensure clear and increase to TID to support adequate PO while on liquid diet.    Nutrition Related Findings:    BUN 6, Cr 0.5, Ca 7.0. LBM 12/4. Wound Type: Surgical Incision       Current Nutrition Intake & Therapies:    Average Meal Intake: Unable to assess (1.1-1.4 L intake range)  Average Supplements Intake: Unable to assess  ADULT DIET; Full Liquid  ADULT ORAL NUTRITION SUPPLEMENT; Breakfast, Dinner, Lunch; Clear Liquid Oral Supplement    Anthropometric Measures:  Height: 162.6 cm (5' 4.02\")  Ideal Body Weight (IBW): 120 lbs (55 kg)       Current Body Weight: 62.3 kg (137 lb 5.6 oz), 114.5 % IBW.    Current BMI (kg/m2): 23.6                             BMI Categories: Normal Weight (BMI 18.5-24.9)    Estimated Daily Nutrient Needs:  Energy Requirements Based On: Kcal/kg  Weight Used for Energy Requirements: Current  Energy (kcal/day): 8395-5893 kcal (25-30 kcal/kg)  Weight Used for Protein Requirements: Current  Protein (g/day): 62-75 g (1-1.2g/kg)  Method Used for Fluid Requirements: 1 ml/kcal  Fluid (ml/day): Or per provider.    Nutrition Diagnosis:   Inadequate oral intake related to acute injury/trauma as evidenced by GI abnormality, s/p surgery    Nutrition Interventions:   Food and/or Nutrient Delivery: Continue Current Diet, Continue Oral Nutrition

## 2024-12-07 NOTE — PROGRESS NOTES
MD Thayer returned phone call and updated. Per MD he is on the way to the hospital and will come see patient and assess abdomen once he arrives.

## 2024-12-07 NOTE — PLAN OF CARE
Problem: Discharge Planning  Goal: Discharge to home or other facility with appropriate resources  Outcome: Progressing  Discharge to home or other facility with appropriate resources:   Identify barriers to discharge with patient and caregiver   Arrange for needed discharge resources and transportation as appropriate   Identify discharge learning needs (meds, wound care, etc)   Refer to discharge planning if patient needs post-hospital services based on physician order or complex needs related to functional status, cognitive ability or social support system      Problem: Pain  Goal: Verbalizes/displays adequate comfort level or baseline comfort level  Outcome: Progressing  Verbalizes/displays adequate comfort level or baseline comfort level:   Encourage patient to monitor pain and request assistance   Assess pain using appropriate pain scale   Administer analgesics based on type and severity of pain and evaluate response   Implement non-pharmacological measures as appropriate and evaluate response   Consider cultural and social influences on pain and pain management   Notify Licensed Independent Practitioner if interventions unsuccessful or patient reports new pain      Problem: Safety - Adult  Goal: Free from fall injury  Outcome: Progressing  Free From Fall Injury: Instruct family/caregiver on patient safety      Problem: ABCDS Injury Assessment  Goal: Absence of physical injury  Outcome: Progressing  Absence of Physical Injury: Implement safety measures based on patient assessment      Problem: Respiratory - Adult  Goal: Achieves optimal ventilation and oxygenation  Outcome: Progressing  Achieves optimal ventilation and oxygenation:   Assess for changes in respiratory status   Assess for changes in mentation and behavior   Encourage broncho-pulmonary hygiene including cough, deep breathe, incentive spirometry      Problem: Skin/Tissue Integrity - Adult  Goal: Incisions, wounds, or drain sites healing without

## 2024-12-07 NOTE — PROGRESS NOTES
Resting quietly.  No complaints of nausea, belching or abdominal cramping so far.  Able to take scheduled Tylenol w/out getting nauseous. UOP improved with the start of the fluids.  Encouraged rest and sleep tonight.  Needs provided.  Call light within reach.    Electronically signed by Barbara Díaz RN on 12/7/2024 at 12:15 AM

## 2024-12-07 NOTE — PROGRESS NOTES
Pt with small amount of swelling noted above midline incision surgical site. Pt with no increase or change in abdominal pain. Notified MD Thayer. Awaiting call back.

## 2024-12-07 NOTE — PROGRESS NOTES
Physical Therapy  Facility/Department: [address] ICU  Physical Therapy Treatment Note    Name: Sabina Rios  : 1965  MRN: [id number]  Date of Service: 2024    Discharge Recommendations:  Continue to assess pending progress (Anticipate adequate progress and assist/support for d/c home.)   PT Equipment Recommendations  Other: Family obtained Rolling Walker.      Sabina Rios scored a 17/24 on the AM-PAC short mobility form.  At this time, no further PT is recommended upon discharge.    Assessment  Body Structures, Functions, Activity Limitations Requiring Skilled Therapeutic Intervention: Decreased functional mobility ;Decreased endurance;Increased pain  Assessment: 60 y/o female admit 2024 with Recurring Diverticulitis of Descending and Sigmoid Colons. 2024 S/P Lap converted to Open Resection of Descending and Sigmoid Colon with Coloproctostomy. PMH Recurring Diverticulitis, IBS, R Wrist Surg.  PTA pt living with  in house with few steps to enter and 1st floor bed/bath; independent daily care and functional mobility (working here at [facility]).  Pt currently requiring Mod assist to Roll for place/repositioning of abd binder and Mod assist moving to eob from sidelying. Transfers/Amb further functional distances with Walker CGA; slow/guarded although no specific LOB. Improving endurance/tolerance. Pt/family appreciative of care.    At this time, anticipate d/c home with adequate family assist/support.  Will cont to monitor pt's progress.  Therapy Prognosis: Good  History: 60 y/o female admit 2024 with Recurring Diverticulitis of Descending and Sigmoid Colons. 2024 S/P Lap converted to Open Resection of Descending and Sigmoid Colon with Coloproctostomy. PMH Recurring Diverticulitis, IBS, R Wrist Surg.  Exam: See above.  Clinical Presentation: See above.  Barriers to Learning: None.  Requires PT Follow-Up: Yes  Activity Tolerance  Activity Tolerance: Patient tolerated  treatment well;Patient limited by pain    Plan  Physical Therapy Plan  General Plan: 3-5 times per week  Current Treatment Recommendations: Strengthening, Therapeutic activities, Functional mobility training, Transfer training, Gait training, Stair training, Safety education & training, Patient/Caregiver education & training  Safety Devices  Type of Devices: Call light within reach, Left in chair, Nurse notified (No chair alarm; pt/family call approp for assist.  Nursing agrees.)    Restrictions  Restrictions/Precautions  Restrictions/Precautions: Fall Risk  Position Activity Restriction  Other Position/Activity Restrictions: Diet : Full Liquid     Subjective  General  Chart Reviewed: Yes  Patient assessed for rehabilitation services?: Yes  Additional Pertinent Hx: 58 y/o female admit 12/4/2024 with Recurring Diverticulitis of Descending and Sigmoid Colons.  12/4/2024 S/P Lap converted to Open Resection of Descending and Sigmoid Colon with Coloproctostomy.  PMH Recurring Diverticulitis, IBS, R Wrist Surg.  Family/Caregiver Present: Yes ( ([facility]))  Referring Practitioner: ARVIN SIMONS  Follows Commands: Within Functional Limits  Subjective  Subjective: Pt/family agreeable to PT Rx.  Some N&V yesterday.  Reports abd pain with activity, may be alittle better at times.  Feels good to increase activity.         Social/Functional History  Social/Functional History  Lives With: Spouse ( (retired).)  Type of Home: House  Home Layout: One level, Able to Live on Main level with bedroom/bathroom  Home Access: Stairs to enter with rails  Entrance Stairs - Number of Steps: 1-2 BUNNY  Bathroom Shower/Tub: Walk-in shower, Shower chair with back  Bathroom Toilet: Handicap height  Bathroom Equipment: None  Bathroom Accessibility: Accessible  Home Equipment: Cane  Has the patient had two or more falls in the past year or any fall with injury in the past year?: No  Prior Level of Assist for ADLs: Independent  Prior Level

## 2024-12-07 NOTE — PLAN OF CARE
Problem: Discharge Planning  Goal: Discharge to home or other facility with appropriate resources  Outcome: Progressing  Flowsheets (Taken 12/6/2024 1935)  Discharge to home or other facility with appropriate resources:   Identify barriers to discharge with patient and caregiver   Arrange for needed discharge resources and transportation as appropriate   Identify discharge learning needs (meds, wound care, etc)   Refer to discharge planning if patient needs post-hospital services based on physician order or complex needs related to functional status, cognitive ability or social support system     Problem: Pain  Goal: Verbalizes/displays adequate comfort level or baseline comfort level  Outcome: Progressing  Flowsheets (Taken 12/6/2024 1935)  Verbalizes/displays adequate comfort level or baseline comfort level:   Encourage patient to monitor pain and request assistance   Assess pain using appropriate pain scale   Administer analgesics based on type and severity of pain and evaluate response   Implement non-pharmacological measures as appropriate and evaluate response   Consider cultural and social influences on pain and pain management   Notify Licensed Independent Practitioner if interventions unsuccessful or patient reports new pain     Problem: Safety - Adult  Goal: Free from fall injury  Outcome: Progressing  Flowsheets (Taken 12/6/2024 1935)  Free From Fall Injury: Instruct family/caregiver on patient safety     Problem: Skin/Tissue Integrity  Goal: Absence of new skin breakdown  Description: 1.  Monitor for areas of redness and/or skin breakdown  2.  Assess vascular access sites hourly  3.  Every 4-6 hours minimum:  Change oxygen saturation probe site  4.  Every 4-6 hours:  If on nasal continuous positive airway pressure, respiratory therapy assess nares and determine need for appliance change or resting period.  Outcome: Progressing     Problem: ABCDS Injury Assessment  Goal: Absence of physical

## 2024-12-08 ENCOUNTER — APPOINTMENT (OUTPATIENT)
Dept: GENERAL RADIOLOGY | Age: 59
End: 2024-12-08
Attending: SURGERY
Payer: COMMERCIAL

## 2024-12-08 ENCOUNTER — ANESTHESIA (OUTPATIENT)
Dept: OPERATING ROOM | Age: 59
End: 2024-12-08
Payer: COMMERCIAL

## 2024-12-08 ENCOUNTER — APPOINTMENT (OUTPATIENT)
Dept: CT IMAGING | Age: 59
End: 2024-12-08
Attending: SURGERY
Payer: COMMERCIAL

## 2024-12-08 ENCOUNTER — ANESTHESIA EVENT (OUTPATIENT)
Dept: OPERATING ROOM | Age: 59
End: 2024-12-08
Payer: COMMERCIAL

## 2024-12-08 PROBLEM — K56.609 SBO (SMALL BOWEL OBSTRUCTION) (HCC): Status: ACTIVE | Noted: 2024-12-08

## 2024-12-08 LAB
ANION GAP SERPL CALCULATED.3IONS-SCNC: 9 MMOL/L (ref 3–16)
BASOPHILS # BLD: 0 K/UL (ref 0–0.2)
BASOPHILS NFR BLD: 0.3 %
BUN SERPL-MCNC: 5 MG/DL (ref 7–20)
CALCIUM SERPL-MCNC: 8.1 MG/DL (ref 8.3–10.6)
CHLORIDE SERPL-SCNC: 109 MMOL/L (ref 99–110)
CO2 SERPL-SCNC: 25 MMOL/L (ref 21–32)
CREAT SERPL-MCNC: 0.6 MG/DL (ref 0.6–1.1)
DEPRECATED RDW RBC AUTO: 13.6 % (ref 12.4–15.4)
EOSINOPHIL # BLD: 0.1 K/UL (ref 0–0.6)
EOSINOPHIL NFR BLD: 1.8 %
GFR SERPLBLD CREATININE-BSD FMLA CKD-EPI: >90 ML/MIN/{1.73_M2}
GLUCOSE SERPL-MCNC: 104 MG/DL (ref 70–99)
HCT VFR BLD AUTO: 33.6 % (ref 36–48)
HGB BLD-MCNC: 11.3 G/DL (ref 12–16)
LYMPHOCYTES # BLD: 0.6 K/UL (ref 1–5.1)
LYMPHOCYTES NFR BLD: 8 %
MCH RBC QN AUTO: 31.2 PG (ref 26–34)
MCHC RBC AUTO-ENTMCNC: 33.6 G/DL (ref 31–36)
MCV RBC AUTO: 92.9 FL (ref 80–100)
MONOCYTES # BLD: 0.5 K/UL (ref 0–1.3)
MONOCYTES NFR BLD: 6.8 %
NEUTROPHILS # BLD: 6.5 K/UL (ref 1.7–7.7)
NEUTROPHILS NFR BLD: 83.1 %
PLATELET # BLD AUTO: 307 K/UL (ref 135–450)
PMV BLD AUTO: 8 FL (ref 5–10.5)
POTASSIUM SERPL-SCNC: 3.3 MMOL/L (ref 3.5–5.1)
RBC # BLD AUTO: 3.61 M/UL (ref 4–5.2)
SODIUM SERPL-SCNC: 143 MMOL/L (ref 136–145)
WBC # BLD AUTO: 7.8 K/UL (ref 4–11)

## 2024-12-08 PROCEDURE — 2580000003 HC RX 258: Performed by: SURGERY

## 2024-12-08 PROCEDURE — 71045 X-RAY EXAM CHEST 1 VIEW: CPT

## 2024-12-08 PROCEDURE — 6370000000 HC RX 637 (ALT 250 FOR IP): Performed by: SURGERY

## 2024-12-08 PROCEDURE — 85025 COMPLETE CBC W/AUTO DIFF WBC: CPT

## 2024-12-08 PROCEDURE — 3600000004 HC SURGERY LEVEL 4 BASE: Performed by: SURGERY

## 2024-12-08 PROCEDURE — 3700000001 HC ADD 15 MINUTES (ANESTHESIA): Performed by: SURGERY

## 2024-12-08 PROCEDURE — 36415 COLL VENOUS BLD VENIPUNCTURE: CPT

## 2024-12-08 PROCEDURE — 49002 REOPENING OF ABDOMEN: CPT | Performed by: SURGERY

## 2024-12-08 PROCEDURE — 6360000004 HC RX CONTRAST MEDICATION: Performed by: SURGERY

## 2024-12-08 PROCEDURE — 2709999900 HC NON-CHARGEABLE SUPPLY: Performed by: SURGERY

## 2024-12-08 PROCEDURE — 6360000002 HC RX W HCPCS: Performed by: SURGERY

## 2024-12-08 PROCEDURE — 6360000002 HC RX W HCPCS: Performed by: PHYSICIAN ASSISTANT

## 2024-12-08 PROCEDURE — 3600000014 HC SURGERY LEVEL 4 ADDTL 15MIN: Performed by: SURGERY

## 2024-12-08 PROCEDURE — 6360000002 HC RX W HCPCS: Performed by: ANESTHESIOLOGY

## 2024-12-08 PROCEDURE — 80048 BASIC METABOLIC PNL TOTAL CA: CPT

## 2024-12-08 PROCEDURE — 3700000000 HC ANESTHESIA ATTENDED CARE: Performed by: SURGERY

## 2024-12-08 PROCEDURE — 74177 CT ABD & PELVIS W/CONTRAST: CPT

## 2024-12-08 PROCEDURE — 74018 RADEX ABDOMEN 1 VIEW: CPT

## 2024-12-08 PROCEDURE — 2500000003 HC RX 250 WO HCPCS: Performed by: ANESTHESIOLOGY

## 2024-12-08 PROCEDURE — 2000000000 HC ICU R&B

## 2024-12-08 PROCEDURE — 2500000003 HC RX 250 WO HCPCS: Performed by: SURGERY

## 2024-12-08 PROCEDURE — 2580000003 HC RX 258: Performed by: ANESTHESIOLOGY

## 2024-12-08 PROCEDURE — A4217 STERILE WATER/SALINE, 500 ML: HCPCS | Performed by: SURGERY

## 2024-12-08 PROCEDURE — C9290 INJ, BUPIVACAINE LIPOSOME: HCPCS | Performed by: SURGERY

## 2024-12-08 PROCEDURE — 0DJD0ZZ INSPECTION OF LOWER INTESTINAL TRACT, OPEN APPROACH: ICD-10-PCS | Performed by: SURGERY

## 2024-12-08 RX ORDER — LIDOCAINE HYDROCHLORIDE 20 MG/ML
INJECTION, SOLUTION EPIDURAL; INFILTRATION; INTRACAUDAL; PERINEURAL
Status: DISCONTINUED | OUTPATIENT
Start: 2024-12-08 | End: 2024-12-08 | Stop reason: SDUPTHER

## 2024-12-08 RX ORDER — PROPOFOL 10 MG/ML
INJECTION, EMULSION INTRAVENOUS
Status: DISCONTINUED | OUTPATIENT
Start: 2024-12-08 | End: 2024-12-08 | Stop reason: SDUPTHER

## 2024-12-08 RX ORDER — DEXMEDETOMIDINE HYDROCHLORIDE 4 UG/ML
.1-1.5 INJECTION, SOLUTION INTRAVENOUS CONTINUOUS
Status: DISCONTINUED | OUTPATIENT
Start: 2024-12-08 | End: 2024-12-08

## 2024-12-08 RX ORDER — SUCCINYLCHOLINE CHLORIDE 20 MG/ML
INJECTION INTRAMUSCULAR; INTRAVENOUS
Status: DISCONTINUED | OUTPATIENT
Start: 2024-12-08 | End: 2024-12-08 | Stop reason: SDUPTHER

## 2024-12-08 RX ORDER — POTASSIUM CHLORIDE 7.45 MG/ML
10 INJECTION INTRAVENOUS
Status: COMPLETED | OUTPATIENT
Start: 2024-12-08 | End: 2024-12-08

## 2024-12-08 RX ORDER — MAGNESIUM HYDROXIDE 1200 MG/15ML
LIQUID ORAL CONTINUOUS PRN
Status: COMPLETED | OUTPATIENT
Start: 2024-12-08 | End: 2024-12-08

## 2024-12-08 RX ORDER — CEFAZOLIN SODIUM 1 G/3ML
INJECTION, POWDER, FOR SOLUTION INTRAMUSCULAR; INTRAVENOUS
Status: DISCONTINUED | OUTPATIENT
Start: 2024-12-08 | End: 2024-12-08 | Stop reason: SDUPTHER

## 2024-12-08 RX ORDER — DEXAMETHASONE SODIUM PHOSPHATE 4 MG/ML
INJECTION, SOLUTION INTRA-ARTICULAR; INTRALESIONAL; INTRAMUSCULAR; INTRAVENOUS; SOFT TISSUE
Status: DISCONTINUED | OUTPATIENT
Start: 2024-12-08 | End: 2024-12-08 | Stop reason: SDUPTHER

## 2024-12-08 RX ORDER — IOPAMIDOL 755 MG/ML
75 INJECTION, SOLUTION INTRAVASCULAR
Status: COMPLETED | OUTPATIENT
Start: 2024-12-08 | End: 2024-12-08

## 2024-12-08 RX ORDER — ROCURONIUM BROMIDE 10 MG/ML
INJECTION, SOLUTION INTRAVENOUS
Status: DISCONTINUED | OUTPATIENT
Start: 2024-12-08 | End: 2024-12-08 | Stop reason: SDUPTHER

## 2024-12-08 RX ORDER — PROMETHAZINE HYDROCHLORIDE 25 MG/ML
12.5 INJECTION, SOLUTION INTRAMUSCULAR; INTRAVENOUS EVERY 6 HOURS PRN
Status: DISCONTINUED | OUTPATIENT
Start: 2024-12-08 | End: 2024-12-14 | Stop reason: HOSPADM

## 2024-12-08 RX ORDER — ONDANSETRON 2 MG/ML
INJECTION INTRAMUSCULAR; INTRAVENOUS
Status: DISCONTINUED | OUTPATIENT
Start: 2024-12-08 | End: 2024-12-08 | Stop reason: SDUPTHER

## 2024-12-08 RX ORDER — KETOROLAC TROMETHAMINE 15 MG/ML
15 INJECTION, SOLUTION INTRAMUSCULAR; INTRAVENOUS EVERY 6 HOURS PRN
Status: DISPENSED | OUTPATIENT
Start: 2024-12-08 | End: 2024-12-10

## 2024-12-08 RX ORDER — SODIUM CHLORIDE 9 MG/ML
INJECTION, SOLUTION INTRAVENOUS
Status: DISCONTINUED | OUTPATIENT
Start: 2024-12-08 | End: 2024-12-08 | Stop reason: SDUPTHER

## 2024-12-08 RX ORDER — MIDAZOLAM HYDROCHLORIDE 1 MG/ML
INJECTION, SOLUTION INTRAMUSCULAR; INTRAVENOUS
Status: DISCONTINUED | OUTPATIENT
Start: 2024-12-08 | End: 2024-12-08 | Stop reason: SDUPTHER

## 2024-12-08 RX ADMIN — POTASSIUM CHLORIDE 10 MEQ: 7.46 INJECTION, SOLUTION INTRAVENOUS at 11:21

## 2024-12-08 RX ADMIN — SUCCINYLCHOLINE CHLORIDE 80 MG: 20 INJECTION, SOLUTION INTRAMUSCULAR; INTRAVENOUS at 13:24

## 2024-12-08 RX ADMIN — SODIUM CHLORIDE: 9 INJECTION, SOLUTION INTRAVENOUS at 08:17

## 2024-12-08 RX ADMIN — LORAZEPAM 0.5 MG: 2 INJECTION INTRAMUSCULAR; INTRAVENOUS at 20:25

## 2024-12-08 RX ADMIN — PROCHLORPERAZINE EDISYLATE 10 MG: 5 INJECTION INTRAMUSCULAR; INTRAVENOUS at 11:00

## 2024-12-08 RX ADMIN — SODIUM CHLORIDE: 9 INJECTION, SOLUTION INTRAVENOUS at 18:11

## 2024-12-08 RX ADMIN — SODIUM CHLORIDE: 9 INJECTION, SOLUTION INTRAVENOUS at 13:19

## 2024-12-08 RX ADMIN — PROCHLORPERAZINE EDISYLATE 10 MG: 5 INJECTION INTRAMUSCULAR; INTRAVENOUS at 23:14

## 2024-12-08 RX ADMIN — LIDOCAINE HYDROCHLORIDE 60 MG: 20 INJECTION, SOLUTION EPIDURAL; INFILTRATION; INTRACAUDAL; PERINEURAL at 13:21

## 2024-12-08 RX ADMIN — SUGAMMADEX 200 MG: 100 INJECTION, SOLUTION INTRAVENOUS at 14:13

## 2024-12-08 RX ADMIN — PROPOFOL 100 MG: 10 INJECTION, EMULSION INTRAVENOUS at 13:24

## 2024-12-08 RX ADMIN — MIDAZOLAM 2 MG: 1 INJECTION INTRAMUSCULAR; INTRAVENOUS at 13:19

## 2024-12-08 RX ADMIN — ONDANSETRON 4 MG: 2 INJECTION INTRAMUSCULAR; INTRAVENOUS at 13:57

## 2024-12-08 RX ADMIN — IOPAMIDOL 75 ML: 755 INJECTION, SOLUTION INTRAVENOUS at 11:43

## 2024-12-08 RX ADMIN — DEXAMETHASONE SODIUM PHOSPHATE 4 MG: 4 INJECTION, SOLUTION INTRAMUSCULAR; INTRAVENOUS at 13:57

## 2024-12-08 RX ADMIN — SODIUM CHLORIDE, PRESERVATIVE FREE 10 ML: 5 INJECTION INTRAVENOUS at 21:00

## 2024-12-08 RX ADMIN — SODIUM CHLORIDE 0.25 MG/KG/HR: 9 INJECTION, SOLUTION INTRAVENOUS at 17:57

## 2024-12-08 RX ADMIN — ROCURONIUM BROMIDE 30 MG: 10 SOLUTION INTRAVENOUS at 13:42

## 2024-12-08 RX ADMIN — ENOXAPARIN SODIUM 40 MG: 100 INJECTION SUBCUTANEOUS at 09:56

## 2024-12-08 RX ADMIN — Medication 30 MG: at 13:19

## 2024-12-08 RX ADMIN — ONDANSETRON 4 MG: 2 INJECTION INTRAMUSCULAR; INTRAVENOUS at 09:53

## 2024-12-08 RX ADMIN — POTASSIUM CHLORIDE 10 MEQ: 7.46 INJECTION, SOLUTION INTRAVENOUS at 10:13

## 2024-12-08 RX ADMIN — CEFAZOLIN SODIUM 2 G: 1 INJECTION, POWDER, FOR SOLUTION INTRAMUSCULAR; INTRAVENOUS at 13:34

## 2024-12-08 RX ADMIN — KETOROLAC TROMETHAMINE 15 MG: 15 INJECTION, SOLUTION INTRAMUSCULAR; INTRAVENOUS at 09:53

## 2024-12-08 RX ADMIN — SIMETHICONE 80 MG: 80 TABLET, CHEWABLE ORAL at 02:16

## 2024-12-08 RX ADMIN — ONDANSETRON 4 MG: 2 INJECTION INTRAMUSCULAR; INTRAVENOUS at 00:12

## 2024-12-08 RX ADMIN — SODIUM CHLORIDE, PRESERVATIVE FREE 10 ML: 5 INJECTION INTRAVENOUS at 10:25

## 2024-12-08 ASSESSMENT — PAIN DESCRIPTION - PAIN TYPE
TYPE: SURGICAL PAIN

## 2024-12-08 ASSESSMENT — PAIN DESCRIPTION - ONSET
ONSET: ON-GOING
ONSET: ON-GOING

## 2024-12-08 ASSESSMENT — PAIN - FUNCTIONAL ASSESSMENT
PAIN_FUNCTIONAL_ASSESSMENT: PREVENTS OR INTERFERES SOME ACTIVE ACTIVITIES AND ADLS

## 2024-12-08 ASSESSMENT — PAIN SCALES - GENERAL
PAINLEVEL_OUTOF10: 10
PAINLEVEL_OUTOF10: 9
PAINLEVEL_OUTOF10: 4
PAINLEVEL_OUTOF10: 4
PAINLEVEL_OUTOF10: 0
PAINLEVEL_OUTOF10: 9

## 2024-12-08 ASSESSMENT — PAIN DESCRIPTION - LOCATION
LOCATION: ABDOMEN

## 2024-12-08 ASSESSMENT — PAIN DESCRIPTION - FREQUENCY
FREQUENCY: CONTINUOUS
FREQUENCY: CONTINUOUS

## 2024-12-08 ASSESSMENT — PAIN DESCRIPTION - ORIENTATION
ORIENTATION: MID

## 2024-12-08 ASSESSMENT — PAIN DESCRIPTION - DESCRIPTORS
DESCRIPTORS: OTHER (COMMENT)
DESCRIPTORS: CRAMPING;OTHER (COMMENT)
DESCRIPTORS: OTHER (COMMENT);CRAMPING
DESCRIPTORS: OTHER (COMMENT)

## 2024-12-08 NOTE — PROGRESS NOTES
Patient had an episode of potent retching & vomiting where she felt like something \"popped\" in the LLQ in the approximate area of the LLQ lap incision. Upon investigation incision was approximated, staples remain intact, & without evidence of any visible opening or leakage from site. Auscultation reveals bowels sounds present in all 4 quadrants, as per previous assessment. Compazine given per eMAR, and then Zofran given per eMAR for persistent nausea. Patient assisted to chair per request. Will monitor.

## 2024-12-08 NOTE — PROGRESS NOTES
Pt c/o nausea requesting PRN Zofran. No emesis. No dry heaving. Medicated with PRN Zofran IVP at this time.

## 2024-12-08 NOTE — PROGRESS NOTES
NAME:  Sabina Rios  YOB: 1965  MEDICAL RECORD NUMBER:  [id number]    Shift Summary: Uneventful day. Pt worked in AM with PT and ambulated [place]. Pt has ambulated twice this shift in [place]cke[place]. Remains on Ketamine gtt for pain control-per MD Thayer was decreased in half this morning. Zofran given once for nausea, no emesis or dry heaving noted. Simethicone given once for cramping. +Flatus. + bowel movement today. Gastelum removed and pt has voided after removal. Pt tolerated full liquid diet.     Family updated: Yes:   Moses at bedside and updated by RN and MD Thayer.     Rhythm: Normal Sinus Rhythm     Most recent vitals:   Visit Vitals  /65   Pulse 83   Temp 98.8 °F (37.1 °C) (Oral)   Resp 19   Ht 1.626 m (5' 4.02\")   Wt 62.3 kg (137 lb 5.6 oz)   SpO2 99%   BMI 23.56 kg/m²           No data found.    No data found.      Respiratory support needed (if any):  - RA    Admission weight Weight - Scale: 64.9 kg (143 lb) (11/14/24 1328)    Today's weight    Wt Readings from Last 1 Encounters:   12/07/24 62.3 kg (137 lb 5.6 oz)        Gastelum need assessed each shift: Gastelum removed   UOP >30ml/hr: YES  Last documented BM (in last 48 hrs):  Patient Vitals for the past 48 hrs:   Last BM (including prior to admit) Stool Occurrence   12/06/24 0800 12/04/24 --   12/06/24 2000 12/04/24 --   12/07/24 0814 12/04/24 --   12/07/24 1232 12/04/24 --   12/07/24 1449 12/07/24 1   12/07/24 1640 12/07/24 --                Restraints (in use currently or dc'd in last 12 hrs): No    Order current and documentation up to date? Yes    Lines/Drains reviewed @ bedside.  Peripheral IV 12/04/24 Left Forearm (Active)   Number of days: 3       Peripheral IV 12/04/24 Left Antecubital (Active)   Number of days: 3         Drip rates at handoff:    ketamine (KETALAR) 500 mg in sodium chloride 0.9 % 100 mL infusion 0.125 mg/kg/hr (12/07/24 1210)    sodium chloride 50 mL/hr at 12/07/24 1704    sodium chloride         Lab

## 2024-12-08 NOTE — PROGRESS NOTES
Pt sitting in chair and called RN at 0950. Upon entry to room pt doubled over in chair with increasing pain incisional and cramping per patient. +Nausea. No dry heaving. Pt feels like she is going to vomit. Emesis bag provided to patient. Medicated at this time with PRN Zofran and Toradol per orders.

## 2024-12-08 NOTE — PROGRESS NOTES
Call placed to MD Thayer regarding PO tylenol scheduled. Per MD Thayer hold tylenol at 1800 until patient is more aware and able to clamp NGT to tolerate PO tylenol.

## 2024-12-08 NOTE — PROGRESS NOTES
Progress Note  Date:2024       Room:[address]  Patient Name:Sabina Rios     YOB: 1965     Age:59 y.o.        Subjective    Subjective:  Symptoms:  Worsening.    Diet:  She reports  nausea and vomiting.    Activity level: Impaired due to weakness.    Pain:  She complains of pain that is moderate.       Review of Systems   Gastrointestinal:  Positive for nausea and vomiting.     Objective         Vitals Last 24 Hours:  TEMPERATURE:  Temp  Av.6 °F (37 °C)  Min: 98.3 °F (36.8 °C)  Max: 98.8 °F (37.1 °C)  RESPIRATIONS RANGE: Resp  Av.6  Min: 13  Max: 36  PULSE OXIMETRY RANGE: SpO2  Av.7 %  Min: 94 %  Max: 100 %  PULSE RANGE: Pulse  Av.7  Min: 63  Max: 99  BLOOD PRESSURE RANGE: Systolic (24hrs), Av , Min:105 , Max:131   ; Diastolic (24hrs), Av, Min:49, Max:75    I/O (24Hr):    Intake/Output Summary (Last 24 hours) at 2024 1154  Last data filed at 2024 1025  Gross per 24 hour   Intake 2431.27 ml   Output 2980 ml   Net -548.73 ml     Objective  Labs/Imaging/Diagnostics    Labs:  CBC:  Recent Labs     24  0737 24  0427 24  0444   WBC 7.5 6.7 7.8   RBC 3.46* 3.17* 3.61*   HGB 10.8* 10.0* 11.3*   HCT 32.5* 29.8* 33.6*   MCV 93.8 93.9 92.9   RDW 13.9 13.6 13.6    234 307     CHEMISTRIES:  Recent Labs     24  0737 24  0427 24  0444    137 143   K 3.8 3.6 3.3*    111* 109   CO2 23 20* 25   BUN 4* 6* 5*   CREATININE 0.5* 0.5* 0.6   GLUCOSE 106* 84 104*     PT/INR:No results for input(s): \"PROTIME\", \"INR\" in the last 72 hours.  APTT:No results for input(s): \"APTT\" in the last 72 hours.  LIVER PROFILE:No results for input(s): \"AST\", \"ALT\", \"BILIDIR\", \"BILITOT\", \"ALKPHOS\" in the last 72 hours.    Imaging Last 24 Hours:  XR ABDOMEN (KUB) (SINGLE AP VIEW)    Result Date: 2024  EXAMINATION: ONE SUPINE XRAY VIEW(S) OF THE ABDOMEN 2024 4:05 am COMPARISON: 2024 HISTORY: ORDERING SYSTEM PROVIDED

## 2024-12-08 NOTE — BRIEF OP NOTE
Brief Postoperative Note      Patient: Sabina Rios  YOB: 1965  MRN: [id number]    Date of Procedure: 12/8/2024    Preop: early SBO following colectomy for diverticulitis    Post-Op Diagnosis: Same       Procedure: reopening of recent laparotomy    Surgeon(s):  Drake Erickson MD    Assistant:  Surgical Assistant: Melonie Thomas    Anesthesia: General    Estimated Blood Loss (mL): less than 50     Complications: None    Specimens:   * No specimens in log *    Implants:  * No implants in log *      Drains:   [REMOVED] Urinary Catheter 12/04/24 2 Way (Removed)   Catheter Indications Perioperative use for selected surgical procedures 12/07/24 1232   Site Assessment No urethral drainage 12/07/24 1232   Urine Color Yellow 12/07/24 1445   Urine Appearance Clear 12/07/24 1445   Urine Odor Other (Comment) 12/05/24 2100   Collection Container Standard 12/07/24 1232   Securement Method Securing device (Describe) 12/07/24 1232   Catheter Care  Perineal wipes 12/07/24 0400   Catheter Best Practices  Drainage tube clipped to bed;Catheter secured to thigh;Tamper seal intact;Bag below bladder;Bag not on floor;Lack of dependent loop in tubing;Drainage bag less than half full 12/07/24 1232   Status Draining;Patent 12/07/24 1232   Output (mL) 880 mL 12/07/24 1445       Findings:  Infection Present At Time Of Surgery (PATOS) (choose all levels that have infection present):  No infection present  Other Findings: early inflammatory adhesions with SBO    Electronically signed by DRAKE ERICKSON MD on 12/8/2024 at 2:21 PM

## 2024-12-08 NOTE — PROGRESS NOTES
At approx 3:15am patient began vomiting large volumes of green liquid, measuring 900ml. Call placed to Dr BRENDEN Thayer; see new orders for KUB x-ray & increase maintenance fluids back to 100ml/hr. Despite increase in pain & discomfort, patient declined scheduled Toradol at 3am. Simethicone given prn @ 2:16AM per eMAR

## 2024-12-08 NOTE — PROGRESS NOTES
MD Thayer on unit. This RN went into room with MD Thayer while he spoke with patient and . Per MD Thayer pt will go to the OR today.

## 2024-12-08 NOTE — PROGRESS NOTES
OR team at bedside. Handoff given to Dr Arnold and OR CHONG Peguero. Pre surgery checklist complete. Pt with two patent PIV's with IVF infusing. See EMAR. CHG bath and linen change complete prior to OR at 1225. See flow sheets.    Family at bedside including  Pat and all children.

## 2024-12-08 NOTE — PROGRESS NOTES
Narcotic Waste Documentation    Administered 175 mg of ketamine and wasted 325 mg per [facility] policy.    Electronically signed by Zulma Tello RN on 12/8/2024 at 5:59 PM    Electronically signed by Casandra Danielle RN on 12/8/2024 at 8:05 PM

## 2024-12-08 NOTE — H&P
Preoperative History and Physical Update    H&P from 12/4/2024 was reviewed    POD 4 from colectomy  Acute onset of nausea and vomiting overnight  Xray and CT worrisome for early postop SBO    PE  Alert and oriented  Nauseated, abdomen distended, incision tenderness      A/P  Early SBO following colectomy  For repeat exploration. Lysis of adhesions today    The risks, benefits and alternatives to the planned procedure were discussed. Patient expressed an understanding and is willing to proceed.    Electronically signed by DRAKE ERICKSON MD on 12/8/2024 at 12:54 PM

## 2024-12-08 NOTE — PROGRESS NOTES
Pt arrived back to room post CT scan. Updated pt and  once results post this RN will contact MD Thayer.

## 2024-12-08 NOTE — PLAN OF CARE
Problem: Pain  Goal: Verbalizes/displays adequate comfort level or baseline comfort level  12/8/2024 0506 by Sebastien Beyer RN  Outcome: Progressing  Flowsheets (Taken 12/8/2024 0506)  Verbalizes/displays adequate comfort level or baseline comfort level:   Encourage patient to monitor pain and request assistance   Assess pain using appropriate pain scale   Administer analgesics based on type and severity of pain and evaluate response   Implement non-pharmacological measures as appropriate and evaluate response   Notify Licensed Independent Practitioner if interventions unsuccessful or patient reports new pain     Problem: Safety - Adult  Goal: Free from fall injury  12/8/2024 0506 by Sebastien Beyer RN  Outcome: Progressing        Problem: Skin/Tissue Integrity  Goal: Absence of new skin breakdown  Description: 1.  Monitor for areas of redness and/or skin breakdown  2.  Assess vascular access sites hourly  3.  Every 4-6 hours minimum:  Change oxygen saturation probe site  4.  Every 4-6 hours:  If on nasal continuous positive airway pressure, respiratory therapy assess nares and determine need for appliance change or resting period.  12/8/2024 0506 by Sebastien Beyer RN  Outcome: Progressing  Note: No evidence of new skin preakdown     Problem: ABCDS Injury Assessment  Goal: Absence of physical injury  12/8/2024 0506 by Sebastien Beyer RN  Outcome: Progressing  Flowsheets (Taken 12/6/2024 1935 by Lyudmila Huang RN)  Absence of Physical Injury: Implement safety measures based on patient assessment     Problem: Respiratory - Adult  Goal: Achieves optimal ventilation and oxygenation  12/8/2024 0506 by Sebastien Beyer RN  Outcome: Progressing  Flowsheets (Taken 12/8/2024 0506)  Achieves optimal ventilation and oxygenation:   Assess for changes in respiratory status   Assess for changes in mentation and behavior   Position to facilitate oxygenation and minimize respiratory  [place] appropriate cooling/warming therapies per order   Administer medications as ordered   Instruct and encourage patient and family to use good hand hygiene technique     Problem: Nutrition Deficit:  Goal: Optimize nutritional status  12/8/2024 0506 by Sebastien Beyer, RN  Outcome: Not Progressing  Nutrient intake appropriate for improving, restoring, or maintaining nutritional needs:   Assess nutritional status and recommend course of action   Monitor oral intake, labs, and treatment plans   Recommend appropriate diets, oral nutritional supplements, and vitamin/mineral supplements   Provide specific nutrition education to patient or family as appropriate

## 2024-12-08 NOTE — PROGRESS NOTES
NAME:  Sabina Rios  YOB: 1965  MEDICAL RECORD NUMBER:  [id number]    Shift Summary: See previous notes from this RN. No vomiting since episode around 3:15am. KUB taken, only unsigned preliminary result so far. Patient has appeared asleep since 4:30am since x-ray was taken. VSS overnight, afebrile. In & out of bed to chair & back to bed several times trying to find comfortable position. Ambulated approx 200' in hallway    Family updated: Yes:  Pat at bedside in evening    Rhythm: Normal Sinus Rhythm     Most recent vitals:   Visit Vitals  /69   Pulse 73   Temp 98.6 °F (37 °C) (Axillary)   Resp 15   Ht 1.626 m (5' 4.02\")   Wt 62.3 kg (137 lb 5.6 oz)   SpO2 95%   BMI 23.56 kg/m²           No data found.    No data found.      Respiratory support needed (if any):  - RA    Admission weight Weight - Scale: 64.9 kg (143 lb) (11/14/24 1328)    Today's weight    Wt Readings from Last 1 Encounters:   12/07/24 62.3 kg (137 lb 5.6 oz)        Scott need assessed each shift: N/A - no scott present  UOP >30ml/hr: YES  Last documented BM (in last 48 hrs):  Patient Vitals for the past 48 hrs:   Last BM (including prior to admit) Stool Occurrence   12/06/24 0800 12/04/24 --   12/06/24 2000 12/04/24 --   12/07/24 0814 12/04/24 --   12/07/24 1232 12/04/24 --   12/07/24 1449 12/07/24 1   12/07/24 1640 12/07/24 --   12/07/24 1950 12/07/24 --                Restraints (in use currently or dc'd in last 12 hrs): No    Order current and documentation up to date? No    Lines/Drains reviewed @ bedside.  Peripheral IV 12/04/24 Left Forearm (Active)   Number of days: 3       Peripheral IV 12/04/24 Left Antecubital (Active)   Number of days: 3         Drip rates at handoff:    ketamine (KETALAR) 500 mg in sodium chloride 0.9 % 100 mL infusion 0.125 mg/kg/hr (12/08/24 0607)    sodium chloride 100 mL/hr at 12/08/24 0607    sodium chloride         Lab Data:   CBC:   Recent Labs     12/07/24  0427 12/08/24  0444   WBC 6.7

## 2024-12-08 NOTE — OP NOTE
[facility]          [address] [place] [address]                            OPERATIVE REPORT      PATIENT NAME: VICKI LOWE             : 1965  MED REC NO: [id number]                      ROOM: [place] [address]  ACCOUNT NO: [id number]                       ADMIT DATE: 2024  PROVIDER: Naveen Thayer MD      DATE OF PROCEDURE:  2024    SURGEON:  Naveen Thayer MD    PREOPERATIVE DIAGNOSIS:  Early small bowel obstruction following recent colectomy.    POSTOPERATIVE DIAGNOSIS:  Early small bowel obstruction following recent colectomy.    PROCEDURE:  Reopening of recent laparotomy with abdominal exploration.    SPECIMEN:  None.    ESTIMATED BLOOD LOSS:  Less than 50 mL.    COMPLICATIONS:  None.    DISPOSITION:  To Recovery in stable condition.    INDICATION:  The patient is a 59-year-old female, who is 4 days postop from a left colectomy for recurring diverticular disease.  She initially did well, but over the last 18 hours, has developed significant epigastric pain, nausea, and vomiting.  An x-ray showed dilation of the proximal small bowel and stomach.  We discussed possible NG tube versus CT imaging and elected to 1st proceed with CT imaging.  On that scan, there was an obvious small bowel obstruction.  There was some residual fluid and air in the pelvis consistent with postoperative findings, but no evidence of an anastomotic issue.  Given the patient's worsening symptoms and timing from her recent laparotomy, we elected to proceed with exploration today to help with any adhesiolysis that would be necessary as well as be able to inspect for any postop issues with the anastomosis.  The risks, benefits, and alternatives were reviewed, and she agreed to proceed.    DESCRIPTION OF PROCEDURE:  The patient was brought to the operating room and placed supine.  General anesthesia intubation performed and the abdomen prepped and draped

## 2024-12-08 NOTE — PROGRESS NOTES
Pt feels hot, mildly diaphoretic and is requesting to get back to bed. RN assisted pt to edge of chair and pt leaned on RN and stated \"I am dizzy\" RN Mary Ellen to bedside to assist getting patient back to bed. With CGA of two RNs pt was able to stand with walker and pivot back to bed and was repositioned. Per pt pain and nausea has not improved at this time. This RN requested PRN Compazine from pharmacy. Cold wash cloth provided to patient for forehead for comfort. No emesis noted. Pt holding emesis bag in hand. Daughter Barbie at bedside and updated.

## 2024-12-09 LAB
ANION GAP SERPL CALCULATED.3IONS-SCNC: 11 MMOL/L (ref 3–16)
BASOPHILS # BLD: 0.1 K/UL (ref 0–0.2)
BASOPHILS NFR BLD: 0.9 %
BUN SERPL-MCNC: 7 MG/DL (ref 7–20)
CALCIUM SERPL-MCNC: 7.4 MG/DL (ref 8.3–10.6)
CHLORIDE SERPL-SCNC: 109 MMOL/L (ref 99–110)
CO2 SERPL-SCNC: 23 MMOL/L (ref 21–32)
CREAT SERPL-MCNC: 0.5 MG/DL (ref 0.6–1.1)
DEPRECATED RDW RBC AUTO: 13.7 % (ref 12.4–15.4)
EOSINOPHIL # BLD: 0.1 K/UL (ref 0–0.6)
EOSINOPHIL NFR BLD: 1.1 %
GFR SERPLBLD CREATININE-BSD FMLA CKD-EPI: >90 ML/MIN/{1.73_M2}
GLUCOSE SERPL-MCNC: 92 MG/DL (ref 70–99)
HCT VFR BLD AUTO: 34.8 % (ref 36–48)
HGB BLD-MCNC: 11.7 G/DL (ref 12–16)
LYMPHOCYTES # BLD: 1.2 K/UL (ref 1–5.1)
LYMPHOCYTES NFR BLD: 13.9 %
MCH RBC QN AUTO: 31.2 PG (ref 26–34)
MCHC RBC AUTO-ENTMCNC: 33.6 G/DL (ref 31–36)
MCV RBC AUTO: 92.8 FL (ref 80–100)
MONOCYTES # BLD: 0.9 K/UL (ref 0–1.3)
MONOCYTES NFR BLD: 10.9 %
NEUTROPHILS # BLD: 6.3 K/UL (ref 1.7–7.7)
NEUTROPHILS NFR BLD: 73.2 %
PLATELET # BLD AUTO: 358 K/UL (ref 135–450)
PMV BLD AUTO: 7.7 FL (ref 5–10.5)
POTASSIUM SERPL-SCNC: 3.4 MMOL/L (ref 3.5–5.1)
RBC # BLD AUTO: 3.75 M/UL (ref 4–5.2)
SODIUM SERPL-SCNC: 143 MMOL/L (ref 136–145)
WBC # BLD AUTO: 8.5 K/UL (ref 4–11)

## 2024-12-09 PROCEDURE — 80048 BASIC METABOLIC PNL TOTAL CA: CPT

## 2024-12-09 PROCEDURE — 6360000002 HC RX W HCPCS: Performed by: SURGERY

## 2024-12-09 PROCEDURE — APPSS15 APP SPLIT SHARED TIME 0-15 MINUTES: Performed by: PHYSICIAN ASSISTANT

## 2024-12-09 PROCEDURE — 85025 COMPLETE CBC W/AUTO DIFF WBC: CPT

## 2024-12-09 PROCEDURE — 97535 SELF CARE MNGMENT TRAINING: CPT

## 2024-12-09 PROCEDURE — 97530 THERAPEUTIC ACTIVITIES: CPT

## 2024-12-09 PROCEDURE — 97110 THERAPEUTIC EXERCISES: CPT

## 2024-12-09 PROCEDURE — 94760 N-INVAS EAR/PLS OXIMETRY 1: CPT

## 2024-12-09 PROCEDURE — 2000000000 HC ICU R&B

## 2024-12-09 PROCEDURE — 2500000003 HC RX 250 WO HCPCS: Performed by: SURGERY

## 2024-12-09 PROCEDURE — 2580000003 HC RX 258: Performed by: SURGERY

## 2024-12-09 PROCEDURE — APPNB15 APP NON BILLABLE TIME 0-15 MINS: Performed by: PHYSICIAN ASSISTANT

## 2024-12-09 PROCEDURE — 36415 COLL VENOUS BLD VENIPUNCTURE: CPT

## 2024-12-09 PROCEDURE — 99024 POSTOP FOLLOW-UP VISIT: CPT | Performed by: PHYSICIAN ASSISTANT

## 2024-12-09 RX ADMIN — POTASSIUM PHOSPHATE, MONOBASIC POTASSIUM PHOSPHATE, DIBASIC 20 MMOL: 224; 236 INJECTION, SOLUTION, CONCENTRATE INTRAVENOUS at 10:06

## 2024-12-09 RX ADMIN — SODIUM CHLORIDE 0.25 MG/KG/HR: 9 INJECTION, SOLUTION INTRAVENOUS at 13:25

## 2024-12-09 RX ADMIN — KETOROLAC TROMETHAMINE 15 MG: 15 INJECTION, SOLUTION INTRAMUSCULAR; INTRAVENOUS at 04:09

## 2024-12-09 RX ADMIN — SODIUM CHLORIDE, PRESERVATIVE FREE 10 ML: 5 INJECTION INTRAVENOUS at 22:38

## 2024-12-09 RX ADMIN — SODIUM CHLORIDE: 9 INJECTION, SOLUTION INTRAVENOUS at 03:18

## 2024-12-09 RX ADMIN — ENOXAPARIN SODIUM 40 MG: 100 INJECTION SUBCUTANEOUS at 10:01

## 2024-12-09 RX ADMIN — SODIUM CHLORIDE: 9 INJECTION, SOLUTION INTRAVENOUS at 22:40

## 2024-12-09 RX ADMIN — SODIUM CHLORIDE: 9 INJECTION, SOLUTION INTRAVENOUS at 13:26

## 2024-12-09 RX ADMIN — ONDANSETRON 4 MG: 2 INJECTION INTRAMUSCULAR; INTRAVENOUS at 04:14

## 2024-12-09 RX ADMIN — LORAZEPAM 0.5 MG: 2 INJECTION INTRAMUSCULAR; INTRAVENOUS at 22:34

## 2024-12-09 RX ADMIN — PROCHLORPERAZINE EDISYLATE 10 MG: 5 INJECTION INTRAMUSCULAR; INTRAVENOUS at 07:43

## 2024-12-09 ASSESSMENT — PAIN SCALES - GENERAL
PAINLEVEL_OUTOF10: 10
PAINLEVEL_OUTOF10: 0
PAINLEVEL_OUTOF10: 5

## 2024-12-09 ASSESSMENT — PAIN DESCRIPTION - ONSET
ONSET: ON-GOING
ONSET: ON-GOING

## 2024-12-09 ASSESSMENT — PAIN DESCRIPTION - FREQUENCY
FREQUENCY: CONTINUOUS
FREQUENCY: CONTINUOUS

## 2024-12-09 ASSESSMENT — PAIN DESCRIPTION - LOCATION
LOCATION: ABDOMEN
LOCATION: ABDOMEN

## 2024-12-09 ASSESSMENT — PAIN DESCRIPTION - PAIN TYPE
TYPE: SURGICAL PAIN
TYPE: SURGICAL PAIN

## 2024-12-09 ASSESSMENT — PAIN DESCRIPTION - ORIENTATION
ORIENTATION: MID
ORIENTATION: MID

## 2024-12-09 ASSESSMENT — PAIN - FUNCTIONAL ASSESSMENT: PAIN_FUNCTIONAL_ASSESSMENT: PREVENTS OR INTERFERES SOME ACTIVE ACTIVITIES AND ADLS

## 2024-12-09 ASSESSMENT — PAIN DESCRIPTION - DESCRIPTORS: DESCRIPTORS: OTHER (COMMENT)

## 2024-12-09 NOTE — PROGRESS NOTES
General and Vascular Surgery                                                           Daily Progress Note                                                             Claudio Estrella PA-C    Pt Name: Sabina Rios  Medical Record Number: [id number]  Date of Birth 1965   Today's Date: 12/9/2024    ASSESSMENT/PLAN  S/P (12/8/24):Reopening of recent laparotomy with abdominal exploration for treatment of early small bowel obstruction following recent colectomy.   S/P (12/4/24): Laparoscopic converted to open resection of descending and sigmoid colon with coloproctostomy.   Pain being monitored and Controlled  WBC count WNL: 8.5  Hgb stable.   Hypokalemia: 3.4. Replace per protocol  Denies any nausea or emesis: NGT in place   Incision site with dressing dry and intact. Will change tomorrow. Abdominal binder in place  OOB as tolerated  Continue current therapy.    EDUCATION  Patient educated about their illness/diagnosis, stated above, and all questions answered. We discussed the importance of nutrition, medications they are taking, and healthy lifestyle.  SUBJECTIVE  Sabina has improved from yesterday. Pain is controlled. She has no nausea and no vomiting NGT in place.  She has not passed flatus and has not had a bowel movement. She is tolerating NPO. Current activity is ad annie    OBJECTIVE  VITALS:  height is 1.626 m (5' 4.02\") and weight is 64.2 kg (141 lb 8.6 oz). Her axillary temperature is 99.8 °F (37.7 °C). Her blood pressure is 135/69 and her pulse is 68. Her respiration is 13 and oxygen saturation is 98%. VITALS:  /69   Pulse 68   Temp 99.8 °F (37.7 °C) (Axillary)   Resp 13   Ht 1.626 m (5' 4.02\")   Wt 64.2 kg (141 lb 8.6 oz)   LMP 03/07/2017   SpO2 98%   BMI 24.28 kg/m²   GENERAL: alert, no distress  LUNGS: clear to ausculation, without wheezes, rales or rhonci  HEART: normal rate and regular rhythm  ABDOMEN: soft, incisional

## 2024-12-09 NOTE — PLAN OF CARE
Problem: Pain  Goal: Verbalizes/displays adequate comfort level or baseline comfort level  Outcome: Progressing     Problem: Safety - Adult  Goal: Free from fall injury  Outcome: Progressing     Problem: Skin/Tissue Integrity  Goal: Absence of new skin breakdown  Description: 1.  Monitor for areas of redness and/or skin breakdown  2.  Assess vascular access sites hourly  3.  Every 4-6 hours minimum:  Change oxygen saturation probe site  4.  Every 4-6 hours:  If on nasal continuous positive airway pressure, respiratory therapy assess nares and determine need for appliance change or resting period.  Outcome: Progressing     Problem: Respiratory - Adult  Goal: Achieves optimal ventilation and oxygenation  Outcome: Progressing  Flowsheets (Taken 12/8/2024 2000)  Achieves optimal ventilation and oxygenation:   Assess for changes in respiratory status   Assess for changes in mentation and behavior   Encourage broncho-pulmonary hygiene including cough, deep breathe, incentive spirometry   Assess and instruct to report shortness of breath or any respiratory difficulty   Respiratory therapy support as indicated     Problem: Skin/Tissue Integrity - Adult  Goal: Incisions, wounds, or drain sites healing without S/S of infection  Outcome: Progressing  Flowsheets (Taken 12/8/2024 2000)  Incisions, Wounds, or Drain Sites Healing Without Sign and Symptoms of Infection: TWICE DAILY: Assess and document skin integrity     Problem: Gastrointestinal - Adult  Goal: Minimal or absence of nausea and vomiting  Outcome: Progressing  Flowsheets (Taken 12/8/2024 2000)  Minimal or absence of nausea and vomiting:   Administer IV fluids as ordered to ensure adequate hydration   Maintain NPO status until nausea and vomiting are resolved   Nasogastric tube to low intermittent suction as ordered   Administer ordered antiemetic medications as needed   Provide nonpharmacologic comfort measures as appropriate     Problem: Gastrointestinal -

## 2024-12-09 NOTE — PROGRESS NOTES
(KETALAR) 500 mg in sodium chloride 0.9 % 100 mL infusion 0.25 mg/kg/hr (12/08/24 1926)    sodium chloride 100 mL/hr at 12/08/24 1926    sodium chloride         Lab Data:   CBC:   Recent Labs     12/07/24 0427 12/08/24 0444   WBC 6.7 7.8   HGB 10.0* 11.3*   HCT 29.8* 33.6*   MCV 93.9 92.9    307     BMP:    Recent Labs     12/07/24 0427 12/08/24 0444    143   K 3.6 3.3*   CO2 20* 25   BUN 6* 5*   CREATININE 0.5* 0.6     LIVR: No results for input(s): \"AST\", \"ALT\" in the last 72 hours.  PT/INR: No results for input(s): \"INR\" in the last 72 hours.    Invalid input(s): \"PROT\"  APTT: No results for input(s): \"APTT\" in the last 72 hours.  ABG: No results for input(s): \"PHART\", \"KCZ2IKS\", \"PO2ART\" in the last 72 hours.    Any consults during the shift? No    Any signed and held orders to be released?  No; post OR orders released upon arrival to floor.         4 Eyes Skin Assessment       The patient is being assessed for  Shift Handoff    I agree that at least one RN has performed a thorough Head to Toe Skin Assessment on the patient. ALL assessment sites listed below have been assessed.      Areas assessed by both nurses: Other Per pt she is comfortable and does not want to move at this time to turn or remove sheets from under her.         Does the Patient have a Wound? No noted wound(s)     Wound Care Orders initiated by RN: No       Sonny Prevention initiated by RN: Yes    Pressure Injury (Stage 3,4, Unstageable, DTI, NWPT, and Complex wounds) if present, place Wound referral order by RN under : No    New Ostomies, if present place, Ostomy referral order under : No     Nurse 1 eSignature: Electronically signed by Casandra Danielle RN on 12/8/24 at 7:59 PM EST    **SHARE this note so that the co-signing nurse can place an eSignature**    Nurse 2 eSignature: Electronically signed by Barbara Díaz RN on 12/8/24 at 9:04 PM EST

## 2024-12-09 NOTE — PROGRESS NOTES
Physical Therapy  Chart reviewed; pt return to OR yesterday.  Please reconsult PT when approp.  Thank you, Jennifer Dawkins, PT

## 2024-12-09 NOTE — PROGRESS NOTES
Narcotic Waste Documentation    Administered 390 mg of Ketamine and wasted 110 mg per [facility] policy.    Electronically signed by Casandra Danielle RN on 12/8/2024 at 8:08 PM    Electronically signed by Jessica L Kerley, RN on 12/8/2024 at 8:08 PM

## 2024-12-09 NOTE — PROGRESS NOTES
Physical Therapy  Facility/Department: [address] ICU  Physical Therapy Re-Evaluation     Name: Sabina Rios  : 1965  MRN: [id number]  Date of Service: 2024    Discharge Recommendations:  Continue to assess pending progress (Anticipate d/c home.)   PT Equipment Recommendations  Other: Family obtained Rolling Walker.      Sabina Rios scored a 15/24 on the AM-PAC short mobility form.  At this time, no further PT is recommended upon.     Assessment  Body Structures, Functions, Activity Limitations Requiring Skilled Therapeutic Intervention: Decreased functional mobility ;Decreased endurance;Increased pain  Assessment: 58 y/o female admit 2024 with Recurring Diverticulitis of Descending and Sigmoid Colons. 2024 S/P Lap converted to Open Resection of Descending and Sigmoid Colon with Coloproctostomy.    2024 Return to OR : Early SBO following recent Colectomy : Reopening of Recent Laparotomy with Abd Exploration.  PMH Recurring Diverticulitis, IBS, R Wrist Surg.  PTA pt living with  in house with few steps to enter and 1st floor bed/bath; independent daily care and functional mobility (working here at [employer]).  Pt currently requiring Max assist to Roll for place/repositioning of abd binder and Max assist moving to eob from sidelying. Transfers/Amb short distance at bedside with  Walker CGA; slow/guarded although no specific LOB. Fatigued following, given return to OR yesterday.   Pt/family appreciative of care.    At this time, anticipate d/c home with adequate family assist/support.  Will cont to monitor pt's progress.  Therapy Prognosis: Good  History: 58 y/o female admit 2024 with Recurring Diverticulitis of Descending and Sigmoid Colons. 2024 S/P Lap converted to Open Resection of Descending and Sigmoid Colon with Coloproctostomy.  2024 Return to OR : Early SBO following recent Colectomy : Reopening of Recent Laparotomy with Abd Exploration.   PMH Recurring

## 2024-12-09 NOTE — PROGRESS NOTES
NAME:  Sabina Rios  YOB: 1965  MEDICAL RECORD NUMBER:  [id number]    Shift Summary: Given Compazine and Zofran for nausea and 1 dose of Ketorolac.  Slept most of the night.  VSS. Afebrile.  NGT with minimal output. Adequate UOP. Ketamine and IVF infusing.  Call light within reach.      @1950- increased output from NGT after flushing. @ 400 ml level.    Family updated: No    Rhythm: Normal Sinus Rhythm     Most recent vitals:   Visit Vitals  /70   Pulse 83   Temp 98.7 °F (37.1 °C) (Oral)   Resp 16   Ht 1.626 m (5' 4.02\")   Wt 64.2 kg (141 lb 8.6 oz)   SpO2 97%   BMI 24.28 kg/m²           No data found.    No data found.      Respiratory support needed (if any):  - RA    Admission weight Weight - Scale: 64.9 kg (143 lb) (11/14/24 1328)    Today's weight    Wt Readings from Last 1 Encounters:   12/09/24 64.2 kg (141 lb 8.6 oz)        Scott need assessed each shift: N/A - no scott present and YES -  - continue scott r/t - perioperative  UOP >30ml/hr: YES  Last documented BM (in last 48 hrs):  Patient Vitals for the past 48 hrs:   Last BM (including prior to admit) Stool Occurrence   12/07/24 0814 12/04/24 --   12/07/24 1232 12/04/24 --   12/07/24 1449 12/07/24 1   12/07/24 1640 12/07/24 --   12/07/24 1950 12/07/24 --   12/08/24 0810 12/07/24 --   12/08/24 1158 12/07/24 --   12/08/24 1422 12/07/24 --   12/08/24 1728 12/07/24 --   12/08/24 2000 12/07/24 --                Restraints (in use currently or dc'd in last 12 hrs): No    Order current and documentation up to date? No    Lines/Drains reviewed @ bedside.  Peripheral IV 12/04/24 Left Forearm (Active)   Number of days: 4       Peripheral IV 12/08/24 Right Forearm (Active)   Number of days: 0       Urinary Catheter 12/08/24 (Active)   Number of days: 0         Drip rates at handoff:    ketamine (KETALAR) 500 mg in sodium chloride 0.9 % 100 mL infusion 0.25 mg/kg/hr (12/09/24 0617)    sodium chloride 100 mL/hr at 12/09/24 0617    sodium

## 2024-12-09 NOTE — PROGRESS NOTES
NAME:  Sabina Rios  YOB: 1965  MEDICAL RECORD NUMBER:  [id number]    Shift Summary: Continue NGT to LIWS, 1L out. Nausea early in AM, given compazine. No nausea since. Up w/ PT/OT to chair for one hour. Pain controlled on ketamine gtt. Potassium replaced w/ 20mmol potassium phos    Family updated: Yes:   at bedside    Rhythm: Normal Sinus Rhythm     Most recent vitals:   Visit Vitals  /62   Pulse 83   Temp 98.4 °F (36.9 °C) (Oral)   Resp 20   Ht 1.626 m (5' 4.02\")   Wt 64.2 kg (141 lb 8.6 oz)   SpO2 97%   BMI 24.28 kg/m²           No data found.    No data found.      Respiratory support needed (if any):  - RA    Admission weight Weight - Scale: 64.9 kg (143 lb) (11/14/24 1328)    Today's weight    Wt Readings from Last 1 Encounters:   12/09/24 64.2 kg (141 lb 8.6 oz)        Scott need assessed each shift: YES -  - continue scott r/t - strict intake and output  UOP >30ml/hr: YES  Last documented BM (in last 48 hrs):  Patient Vitals for the past 48 hrs:   Last BM (including prior to admit)   12/07/24 1950 12/07/24 12/08/24 0810 12/07/24 12/08/24 1158 12/07/24 12/08/24 1422 12/07/24 12/08/24 1728 12/07/24 12/08/24 2000 12/07/24                Restraints (in use currently or dc'd in last 12 hrs): No    Order current and documentation up to date? No    Lines/Drains reviewed @ bedside.  Peripheral IV 12/04/24 Left Forearm (Active)   Number of days: 5       Peripheral IV 12/08/24 Right Forearm (Active)   Number of days: 1       Urinary Catheter 12/08/24 (Active)   Number of days: 1         Drip rates at handoff:    ketamine (KETALAR) 500 mg in sodium chloride 0.9 % 100 mL infusion 0.25 mg/kg/hr (12/09/24 1325)    sodium chloride 100 mL/hr at 12/09/24 1326    sodium chloride         Lab Data:   CBC:   Recent Labs     12/08/24 0444 12/09/24  0459   WBC 7.8 8.5   HGB 11.3* 11.7*   HCT 33.6* 34.8*   MCV 92.9 92.8    358     BMP:    Recent Labs     12/08/24  0444

## 2024-12-09 NOTE — PROGRESS NOTES
Dr Thayer at bedside. Updated on pt's status. Order received to continue holding PO meds at this time. Ok for ice chips and popsicles as tolerated.  Ok to re-consult PT/OT.

## 2024-12-09 NOTE — PROGRESS NOTES
Shower/Tub: Walk-in shower, Shower chair with back  Bathroom Toilet: Handicap height  Bathroom Equipment: None  Bathroom Accessibility: Accessible  Home Equipment: Cane  Has the patient had two or more falls in the past year or any fall with injury in the past year?: No  Prior Level of Assist for ADLs: Independent  Prior Level of Assist for Homemaking: Independent  Prior Level of Assist for Ambulation: Independent household ambulator, with or without device (Without assist device pta.)  Prior Level of Assist for Transfers: Independent  Active : Yes  Occupation: Full time employment  Type of Occupation: Works : [employer].  Additional Comments: Family obtained walker for use as needed upon d/c.    Objective  Observation/Palpation  Observation: Abdominal binder in place; repositioned for optimal fit/comfort.  Safety Devices  Type of Devices: Call light within reach;Left in chair;Nurse notified (No chair alarm; pt/family call approp for assist.  Nursing agrees.)           ADL  Grooming: Setup  Grooming Skilled Clinical Factors: pt washed her face while seated in recliner w/ s/u only  UE Dressing Skilled Clinical Factors: assisted w/ gown  Toileting Skilled Clinical Factors: pt denied the need to void  Functional Mobility: Contact guard assistance  Functional Mobility Skilled Clinical Factors: RW. Pt able to take a few steps from bed>chair w/ CGA, no overt LOB. No further amb d/t pt activity tolerance  Additional Comments: Pt is anticipated to require up to Min-Mod A for full ADLs based on her strength, pain, and activity tolerance  Product Used : Soap and water     Activity Tolerance  Activity Tolerance: Patient limited by pain;Patient limited by endurance;Patient limited by fatigue  Bed mobility  Rolling to Right: Maximum assistance  Supine to Sit: Maximum assistance  Bed Mobility Comments: Rolling Max assist; to eob from sidelying Max assist.  Transfers  Sit to stand: Contact guard assistance  Stand to sit:  Contact guard assistance  Transfer Comments: to/from RW x1 from EOB, and x1 from recliner after seated rest break and grooming  Vision  Vision: Within Functional Limits  Hearing  Hearing: Within functional limits  Cognition  Overall Cognitive Status: WFL  Orientation  Overall Orientation Status: Within Functional Limits                  Education Given To: Patient  Education Provided: Role of Therapy;Plan of Care;Transfer Training;Precautions  Education Method: Verbal;Demonstration  Barriers to Learning: None  Education Outcome: Verbalized understanding     AM-PAC - ADL  AM-PAC Daily Activity - Inpatient   How much help is needed for putting on and taking off regular lower body clothing?: A Lot  How much help is needed for bathing (which includes washing, rinsing, drying)?: A Lot  How much help is needed for toileting (which includes using toilet, bedpan, or urinal)?: A Lot  How much help is needed for putting on and taking off regular upper body clothing?: None  How much help is needed for taking care of personal grooming?: A Little  How much help for eating meals?: None  AM-[facility] Inpatient Daily Activity Raw Score: 17  AM-PAC Inpatient ADL T-Scale Score : 37.26  ADL Inpatient CMS 0-100% Score: 50.11  ADL Inpatient CMS G-Code Modifier : CK    Goals  Short Term Goals  Time Frame for Short Term Goals: Prior to d/c: goals reassessed and ongoing 12/9  Short Term Goal 1: Pt will complete ADL transfers w/ supervision  Short Term Goal 2: Pt will toilet w/ supervision  Short Term Goal 3: Pt will groom in stance at sink w/ supervision  Short Term Goal 4: Pt will bathe w/ supervision  Short Term Goal 5: Pt will complete LB dressing w/ supervision  Long Term Goals  Time Frame for Long Term Goals : LTG=STG  Patient Goals   Patient goals : to return home      Therapy Time   Individual Concurrent Group Co-treatment   Time In 1400         Time Out 1430         Minutes 30         Timed Code Treatment Minutes: 30 Minutes

## 2024-12-10 ENCOUNTER — APPOINTMENT (OUTPATIENT)
Dept: GENERAL RADIOLOGY | Age: 59
End: 2024-12-10
Attending: SURGERY
Payer: COMMERCIAL

## 2024-12-10 LAB
ANION GAP SERPL CALCULATED.3IONS-SCNC: 15 MMOL/L (ref 3–16)
BASOPHILS # BLD: 0 K/UL (ref 0–0.2)
BASOPHILS NFR BLD: 0.6 %
BUN SERPL-MCNC: 7 MG/DL (ref 7–20)
CALCIUM SERPL-MCNC: 7.2 MG/DL (ref 8.3–10.6)
CHLORIDE SERPL-SCNC: 106 MMOL/L (ref 99–110)
CO2 SERPL-SCNC: 17 MMOL/L (ref 21–32)
CREAT SERPL-MCNC: 0.4 MG/DL (ref 0.6–1.1)
DEPRECATED RDW RBC AUTO: 13.6 % (ref 12.4–15.4)
EOSINOPHIL # BLD: 0.2 K/UL (ref 0–0.6)
EOSINOPHIL NFR BLD: 2.1 %
GFR SERPLBLD CREATININE-BSD FMLA CKD-EPI: >90 ML/MIN/{1.73_M2}
GLUCOSE BLD-MCNC: 107 MG/DL (ref 70–99)
GLUCOSE BLD-MCNC: 118 MG/DL (ref 70–99)
GLUCOSE BLD-MCNC: 62 MG/DL (ref 70–99)
GLUCOSE BLD-MCNC: 94 MG/DL (ref 70–99)
GLUCOSE SERPL-MCNC: 70 MG/DL (ref 70–99)
HCT VFR BLD AUTO: 34.7 % (ref 36–48)
HGB BLD-MCNC: 11.5 G/DL (ref 12–16)
LYMPHOCYTES # BLD: 1.4 K/UL (ref 1–5.1)
LYMPHOCYTES NFR BLD: 16.8 %
MCH RBC QN AUTO: 30.7 PG (ref 26–34)
MCHC RBC AUTO-ENTMCNC: 33.2 G/DL (ref 31–36)
MCV RBC AUTO: 92.6 FL (ref 80–100)
MONOCYTES # BLD: 0.8 K/UL (ref 0–1.3)
MONOCYTES NFR BLD: 10 %
NEUTROPHILS # BLD: 5.9 K/UL (ref 1.7–7.7)
NEUTROPHILS NFR BLD: 70.5 %
PERFORMED ON: ABNORMAL
PERFORMED ON: NORMAL
PLATELET # BLD AUTO: 386 K/UL (ref 135–450)
PMV BLD AUTO: 7.4 FL (ref 5–10.5)
POTASSIUM SERPL-SCNC: 3.5 MMOL/L (ref 3.5–5.1)
RBC # BLD AUTO: 3.75 M/UL (ref 4–5.2)
SODIUM SERPL-SCNC: 138 MMOL/L (ref 136–145)
WBC # BLD AUTO: 8.3 K/UL (ref 4–11)

## 2024-12-10 PROCEDURE — 6360000002 HC RX W HCPCS: Performed by: SURGERY

## 2024-12-10 PROCEDURE — 2000000000 HC ICU R&B

## 2024-12-10 PROCEDURE — 97110 THERAPEUTIC EXERCISES: CPT

## 2024-12-10 PROCEDURE — 36415 COLL VENOUS BLD VENIPUNCTURE: CPT

## 2024-12-10 PROCEDURE — APPNB15 APP NON BILLABLE TIME 0-15 MINS: Performed by: PHYSICIAN ASSISTANT

## 2024-12-10 PROCEDURE — APPSS15 APP SPLIT SHARED TIME 0-15 MINUTES: Performed by: PHYSICIAN ASSISTANT

## 2024-12-10 PROCEDURE — 2580000003 HC RX 258: Performed by: PHYSICIAN ASSISTANT

## 2024-12-10 PROCEDURE — 97530 THERAPEUTIC ACTIVITIES: CPT

## 2024-12-10 PROCEDURE — 99024 POSTOP FOLLOW-UP VISIT: CPT | Performed by: PHYSICIAN ASSISTANT

## 2024-12-10 PROCEDURE — 80048 BASIC METABOLIC PNL TOTAL CA: CPT

## 2024-12-10 PROCEDURE — 97116 GAIT TRAINING THERAPY: CPT

## 2024-12-10 PROCEDURE — 2580000003 HC RX 258: Performed by: SURGERY

## 2024-12-10 PROCEDURE — 2500000003 HC RX 250 WO HCPCS: Performed by: SURGERY

## 2024-12-10 PROCEDURE — 97535 SELF CARE MNGMENT TRAINING: CPT

## 2024-12-10 PROCEDURE — 74018 RADEX ABDOMEN 1 VIEW: CPT

## 2024-12-10 PROCEDURE — 85025 COMPLETE CBC W/AUTO DIFF WBC: CPT

## 2024-12-10 RX ORDER — GLUCAGON 1 MG/ML
1 KIT INJECTION PRN
Status: DISCONTINUED | OUTPATIENT
Start: 2024-12-10 | End: 2024-12-14 | Stop reason: HOSPADM

## 2024-12-10 RX ORDER — DEXTROSE MONOHYDRATE 100 MG/ML
INJECTION, SOLUTION INTRAVENOUS CONTINUOUS PRN
Status: DISCONTINUED | OUTPATIENT
Start: 2024-12-10 | End: 2024-12-14 | Stop reason: HOSPADM

## 2024-12-10 RX ORDER — KETOROLAC TROMETHAMINE 15 MG/ML
15 INJECTION, SOLUTION INTRAMUSCULAR; INTRAVENOUS EVERY 6 HOURS PRN
Status: DISCONTINUED | OUTPATIENT
Start: 2024-12-10 | End: 2024-12-13

## 2024-12-10 RX ORDER — DEXTROSE MONOHYDRATE, SODIUM CHLORIDE, AND POTASSIUM CHLORIDE 50; 2.24; 4.5 G/1000ML; G/1000ML; G/1000ML
INJECTION, SOLUTION INTRAVENOUS CONTINUOUS
Status: DISCONTINUED | OUTPATIENT
Start: 2024-12-10 | End: 2024-12-13

## 2024-12-10 RX ORDER — POTASSIUM CHLORIDE 7.45 MG/ML
10 INJECTION INTRAVENOUS PRN
Status: DISCONTINUED | OUTPATIENT
Start: 2024-12-10 | End: 2024-12-14 | Stop reason: HOSPADM

## 2024-12-10 RX ADMIN — POTASSIUM CHLORIDE, DEXTROSE MONOHYDRATE AND SODIUM CHLORIDE: 224; 5; 450 INJECTION, SOLUTION INTRAVENOUS at 20:10

## 2024-12-10 RX ADMIN — POTASSIUM CHLORIDE 10 MEQ: 7.46 INJECTION, SOLUTION INTRAVENOUS at 12:42

## 2024-12-10 RX ADMIN — SODIUM CHLORIDE: 9 INJECTION, SOLUTION INTRAVENOUS at 08:43

## 2024-12-10 RX ADMIN — POTASSIUM CHLORIDE 10 MEQ: 7.46 INJECTION, SOLUTION INTRAVENOUS at 15:05

## 2024-12-10 RX ADMIN — POTASSIUM CHLORIDE 10 MEQ: 7.46 INJECTION, SOLUTION INTRAVENOUS at 14:02

## 2024-12-10 RX ADMIN — LORAZEPAM 0.5 MG: 2 INJECTION INTRAMUSCULAR; INTRAVENOUS at 21:41

## 2024-12-10 RX ADMIN — POTASSIUM CHLORIDE, DEXTROSE MONOHYDRATE AND SODIUM CHLORIDE: 224; 5; 450 INJECTION, SOLUTION INTRAVENOUS at 11:28

## 2024-12-10 RX ADMIN — ENOXAPARIN SODIUM 40 MG: 100 INJECTION SUBCUTANEOUS at 08:43

## 2024-12-10 RX ADMIN — DEXTROSE MONOHYDRATE 125 ML: 100 INJECTION, SOLUTION INTRAVENOUS at 09:03

## 2024-12-10 RX ADMIN — LORAZEPAM 0.5 MG: 2 INJECTION INTRAMUSCULAR; INTRAVENOUS at 04:47

## 2024-12-10 RX ADMIN — POTASSIUM CHLORIDE 10 MEQ: 7.46 INJECTION, SOLUTION INTRAVENOUS at 11:32

## 2024-12-10 ASSESSMENT — PAIN DESCRIPTION - LOCATION
LOCATION: ABDOMEN

## 2024-12-10 ASSESSMENT — PAIN SCALES - GENERAL
PAINLEVEL_OUTOF10: 7
PAINLEVEL_OUTOF10: 0
PAINLEVEL_OUTOF10: 0
PAINLEVEL_OUTOF10: 6
PAINLEVEL_OUTOF10: 2

## 2024-12-10 ASSESSMENT — PAIN DESCRIPTION - ONSET
ONSET: ON-GOING

## 2024-12-10 ASSESSMENT — PAIN - FUNCTIONAL ASSESSMENT
PAIN_FUNCTIONAL_ASSESSMENT: PREVENTS OR INTERFERES SOME ACTIVE ACTIVITIES AND ADLS

## 2024-12-10 ASSESSMENT — PAIN DESCRIPTION - FREQUENCY
FREQUENCY: CONTINUOUS

## 2024-12-10 ASSESSMENT — PAIN DESCRIPTION - PAIN TYPE
TYPE: ACUTE PAIN
TYPE: ACUTE PAIN;SURGICAL PAIN
TYPE: SURGICAL PAIN

## 2024-12-10 ASSESSMENT — PAIN DESCRIPTION - ORIENTATION
ORIENTATION: MID

## 2024-12-10 ASSESSMENT — PAIN DESCRIPTION - DESCRIPTORS: DESCRIPTORS: THROBBING

## 2024-12-10 NOTE — PROGRESS NOTES
Physical Therapy  Facility/Department: [address] ICU  Physical Therapy Treatment Note    Name: Sabina Rios  : 1965  MRN: [id number]  Date of Service: 12/10/2024    Discharge Recommendations:  Continue to assess pending progress (Anticipate d/c home.)   PT Equipment Recommendations  Other: Family obtained Rolling Walker.      Sabina Rios scored a 16/24 on the AM-PAC short mobility form.  At this time, no further PT is recommended upon discharge.    Assessment  Body Structures, Functions, Activity Limitations Requiring Skilled Therapeutic Intervention: Decreased functional mobility ;Decreased endurance;Increased pain  Assessment: 58 y/o female admit 2024 with Recurring Diverticulitis of Descending and Sigmoid Colons. 2024 S/P Lap converted to Open Resection of Descending and Sigmoid Colon with Coloproctostomy.    2024 Return to OR : Early SBO following recent Colectomy : Reopening of Recent Laparotomy with Abd Exploration.  PMH Recurring Diverticulitis, IBS, R Wrist Surg.  PTA pt living with  in house with few steps to enter and 1st floor bed/bath; independent daily care and functional mobility (working here at [employer]).  Pt currently requiring Mod assist to Roll for place/repositioning of abd binder and Mod  assist moving to eob from sidelying. Transfers SBA/Sight CGA, Amb further functional distances with Walker SBA.  Pt progressing well.  Mvt overall becoming less guarded; only occass cues needed.   Pt/family appreciative of care.  At this time, anticipate d/c home with adequate family assist/support.  Will cont to monitor pt's progress.  History: 58 y/o female admit 2024 with Recurring Diverticulitis of Descending and Sigmoid Colons. 2024 S/P Lap converted to Open Resection of Descending and Sigmoid Colon with Coloproctostomy.  2024 Return to OR : Early SBO following recent Colectomy : Reopening of Recent Laparotomy with Abd Exploration.   PMH Recurring  Handicap height  Bathroom Equipment: None  Bathroom Accessibility: Accessible  Home Equipment: Cane  Has the patient had two or more falls in the past year or any fall with injury in the past year?: No  Prior Level of Assist for ADLs: Independent  Prior Level of Assist for Homemaking: Independent  Prior Level of Assist for Ambulation: Independent household ambulator, with or without device (Without assist device pta.)  Prior Level of Assist for Transfers: Independent  Active : Yes  Occupation: Full time employment  Type of Occupation: Works : [employer].  Additional Comments: Family obtained walker for use as needed upon d/c.  Vision/Hearing  Vision  Vision: Within Functional Limits  Hearing  Hearing: Within functional limits    Cognition   Orientation  Overall Orientation Status: Within Functional Limits  Cognition  Overall Cognitive Status: WFL    Objective       Observation/Palpation  Observation: Abdominal binder in place; repositioned for optimal fit/comfort.                      Bed mobility  Rolling to Left: Moderate assistance  Supine to Sit: Moderate assistance (Via Sidelying.)  Bed Mobility Comments: Rolling Mod assist; to eob from sidelying Mod assist.  Transfers  Sit to Stand: Stand by assistance;Contact guard assistance  Stand to Sit: Stand by assistance;Contact guard assistance  Comment: Completed several transfers from various surfaces with Walker SBA/CGA; occass cues for positioning/hand placement although improving to/from upright, less guarded.  Ambulation  Surface: Level tile  Device: Rolling Walker  Distance: Pt amb within hospital room setting, additional 100', 160', 300' with Walker SBA.  No LE buckling/giving way; slow/guarded although improving.  Occass cues for more upright stand posture and for transitional mvts.  Progress well.             AM-PAC - Mobility    AM-PAC Basic Mobility - Inpatient   How much help is needed turning from your back to your side while in a flat bed without

## 2024-12-10 NOTE — PROGRESS NOTES
NAME:  Sabina Rios  YOB: 1965  MEDICAL RECORD NUMBER:  [id number]    Shift Summary: D10 bolus given for hypoglycemia. Fluids changed from NS to D5 1/2NS with KCL. Scott removed. KUB showed improvement of gaseous distention. NGT clamped x3 hours. 200ml out when re-connected. NGT removed. Dr zamora okay with small amount of ice chips. Ketamine gtt halved. Worked with PT, multiple laps walked around hallway. Up in chair throughout day.      Family updated: Yes:  pt's  at bedside throughout day    Rhythm: Normal Sinus Rhythm     Most recent vitals:   Visit Vitals  /63   Pulse 92   Temp 98.9 °F (37.2 °C) (Oral)   Resp 25   Ht 1.626 m (5' 4.02\")   Wt 64.5 kg (142 lb 3.2 oz)   SpO2 96%   BMI 24.40 kg/m²           No data found.    No data found.      Respiratory support needed (if any):  - RA    Admission weight Weight - Scale: 64.9 kg (143 lb) (11/14/24 1328)    Today's weight    Wt Readings from Last 1 Encounters:   12/10/24 64.5 kg (142 lb 3.2 oz)        Scott need assessed each shift: N/A - no scott present  UOP >30ml/hr: YES  Last documented BM (in last 48 hrs):  Patient Vitals for the past 48 hrs:   Last BM (including prior to admit)   12/08/24 2000 12/07/24 12/09/24 2000 12/07/24                Restraints (in use currently or dc'd in last 12 hrs): No    Order current and documentation up to date? No    Lines/Drains reviewed @ bedside.  Peripheral IV 12/10/24 Left Forearm (Active)   Number of days: 0       Peripheral IV 12/10/24 Left;Proximal Forearm (Active)   Number of days: 0         Drip rates at handoff:    dextrose      dextrose 5% and 0.45% NaCl with KCl 30 mEq 100 mL/hr at 12/10/24 1128    ketamine (KETALAR) 500 mg in sodium chloride 0.9 % 100 mL infusion 0.125 mg/kg/hr (12/10/24 1349)    sodium chloride         Lab Data:   CBC:   Recent Labs     12/09/24  0459 12/10/24  0447   WBC 8.5 8.3   HGB 11.7* 11.5*   HCT 34.8* 34.7*   MCV 92.8 92.6    386     BMP:    Recent

## 2024-12-10 NOTE — PROGRESS NOTES
Occupational Therapy  Facility/Department: [address] ICU  Occupational Therapy Daily Treatment    Name: Sabina Rios  : 1965  MRN: [id number]  Date of Service: 12/10/2024    Discharge Recommendations:  24 hour supervision or assist, Home with assist PRN, Continue to assess pending progress (may need initial  though may progress to PRN A)     Sabina Rios scored a 17 on the AM-PAC ADL Inpatient form.  At this time, no further OT is recommended upon discharge due to pt nearing baseline function.  Recommend patient returns to prior setting with prior services.         Patient Diagnosis(es): The encounter diagnosis was Diverticulitis.  Past Medical History:  has a past medical history of Anesthesia, Arthritis, B12 deficiency, Bilateral hearing loss, Diverticulitis, Dysthymia, Family history of colon cancer, GERD (gastroesophageal reflux disease), Hiatal hernia, HLD (hyperlipidemia), Irritable bowel syndrome with constipation, and Tinnitus.  Past Surgical History:  has a past surgical history that includes Wrist surgery (Right, ); Breast surgery (); Tonsillectomy; Endometrial ablation; Colonoscopy (2018); Upper gastrointestinal endoscopy (N/A, 2024); sigmoidoscopy (N/A, 2024); Upper gastrointestinal endoscopy (N/A, 2024); Sigmoid Colectomy (N/A, 2024); and laparotomy (N/A, 2024).    Treatment Diagnosis: Decreased: ADLs, functional transfers/mobility    Assessment  Performance deficits / Impairments: Decreased functional mobility ;Decreased ADL status  Assessment: Pt is a 58 yo F who presented with Recurring diverticulitis of the descending and sigmoid colons and is s/p Laparoscopic converted to open resection of descending and sigmoid colon with coloproctostomy. On , pt underwent reopening of recent laparotomy w/ abd exploration.  At baseline, pt lives at home with her  where she is typically independent, works FT, drives. Pt remains below baseline but

## 2024-12-10 NOTE — PROGRESS NOTES
Dr Thayer at bedside. Updated that NGT had 200ml out when re-connected to suction. Pt without nausea or increased pain. Dr Thayer okay with removing. NGT removed without complication.

## 2024-12-10 NOTE — PROGRESS NOTES
NAME:  Sabina Rios  YOB: 1965  MEDICAL RECORD NUMBER:  [id number]    Shift Summary: 1L out of NGT overnight.  Received 2 doses of Ativan per request to help her relax and sleep.  No PRN nausea nor PRN medication medication given.  Active BS.      Pt encouraged to stay off the bed longer today if able. Call light within reach.     Family updated: No    Rhythm: Normal Sinus Rhythm     Most recent vitals:   Visit Vitals  /67   Pulse 83   Temp 98 °F (36.7 °C) (Oral)   Resp 16   Ht 1.626 m (5' 4.02\")   Wt 64.5 kg (142 lb 3.2 oz)   SpO2 95%   BMI 24.40 kg/m²           No data found.    No data found.      Respiratory support needed (if any):  - RA    Admission weight Weight - Scale: 64.9 kg (143 lb) (11/14/24 1328)    Today's weight    Wt Readings from Last 1 Encounters:   12/10/24 64.5 kg (142 lb 3.2 oz)        Scott need assessed each shift: YES -  - continue scott r/t - strict intake and output  UOP >30ml/hr: YES  Last documented BM (in last 48 hrs):  Patient Vitals for the past 48 hrs:   Last BM (including prior to admit)   12/08/24 0810 12/07/24 12/08/24 1158 12/07/24 12/08/24 1422 12/07/24 12/08/24 1728 12/07/24 12/08/24 2000 12/07/24 12/09/24 2000 12/07/24                Restraints (in use currently or dc'd in last 12 hrs): No    Order current and documentation up to date? No    Lines/Drains reviewed @ bedside.  Peripheral IV 12/08/24 Right Forearm (Active)   Number of days: 1       Peripheral IV 12/10/24 Left Forearm (Active)   Number of days: 0       Urinary Catheter 12/08/24 (Active)   Number of days: 1         Drip rates at handoff:    ketamine (KETALAR) 500 mg in sodium chloride 0.9 % 100 mL infusion 0.25 mg/kg/hr (12/10/24 0629)    sodium chloride 100 mL/hr at 12/10/24 0629    sodium chloride         Lab Data:   CBC:   Recent Labs     12/09/24  0459 12/10/24  0447   WBC 8.5 8.3   HGB 11.7* 11.5*   HCT 34.8* 34.7*   MCV 92.8 92.6    386     BMP:    Recent Labs

## 2024-12-10 NOTE — PLAN OF CARE
Problem: Discharge Planning  Goal: Discharge to home or other facility with appropriate resources  Recent Flowsheet Documentation  Taken 12/9/2024 2000 by Barbara Díaz RN  Discharge to home or other facility with appropriate resources: Identify barriers to discharge with patient and caregiver     Problem: Pain  Goal: Verbalizes/displays adequate comfort level or baseline comfort level  12/9/2024 2039 by Barbara Díaz RN  Outcome: Progressing     Problem: Safety - Adult  Goal: Free from fall injury  12/9/2024 2039 by Barbara Díaz RN  Outcome: Progressing     Problem: Skin/Tissue Integrity  Goal: Absence of new skin breakdown  Description: 1.  Monitor for areas of redness and/or skin breakdown  2.  Assess vascular access sites hourly  3.  Every 4-6 hours minimum:  Change oxygen saturation probe site  4.  Every 4-6 hours:  If on nasal continuous positive airway pressure, respiratory therapy assess nares and determine need for appliance change or resting period.  12/9/2024 2039 by Barbara Díaz RN  Outcome: Progressing     Problem: ABCDS Injury Assessment  Goal: Absence of physical injury  12/9/2024 2039 by Barbara Díaz RN  Outcome: Progressing     Problem: Respiratory - Adult  Goal: Achieves optimal ventilation and oxygenation  12/9/2024 2039 by Barbara Díaz RN  Outcome: Progressing  Flowsheets (Taken 12/9/2024 2000)  Achieves optimal ventilation and oxygenation:   Assess for changes in respiratory status   Assess and instruct to report shortness of breath or any respiratory difficulty   Respiratory therapy support as indicated     Problem: Skin/Tissue Integrity - Adult  Goal: Incisions, wounds, or drain sites healing without S/S of infection  12/9/2024 2039 by Barbara Díaz RN  Outcome: Progressing  Flowsheets (Taken 12/9/2024 2000)  Incisions, Wounds, or Drain Sites Healing Without Sign and Symptoms of Infection: TWICE DAILY: Assess and document skin integrity     Problem: Gastrointestinal -

## 2024-12-10 NOTE — PROGRESS NOTES
Assisted to ambulate in hallway. Tolerated approximately 300 feet with walker, standby assist. Ambulate to bathroom to void, then assisted back to chair. States no nausea, pain unchanged from prior to walking. NG remains clamped.

## 2024-12-10 NOTE — PROGRESS NOTES
General and Vascular Surgery                                                           Daily Progress Note                                                             Claudio Estrella PA-C    Pt Name: Sabina Rios  Medical Record Number: [id number]  Date of Birth 1965   Today's Date: 12/10/2024    ASSESSMENT/PLAN  S/P (12/8/24):Reopening of recent laparotomy with abdominal exploration for treatment of early small bowel obstruction following recent colectomy.   S/P (12/4/24): Laparoscopic converted to open resection of descending and sigmoid colon with coloproctostomy.   Pain being monitored and Controlled  WBC count WNL: 8.5-->8.3  Hypoglycemic this AM. Dr. Thayer order D5. Will also get a KUB to eval for improvement and possible NGT removal.   Hgb stable.   Denies any nausea or emesis: NGT in place   +flatulence. Denies any BM.  Incision site looks good. Healing well. No drainage, or significant erythema. +appropriate pain.   OOB as tolerated  Continue current therapy at this time. Hopefully able to remove NGT and scott cath later today.     EDUCATION  Patient educated about their illness/diagnosis, stated above, and all questions answered. We discussed the importance of nutrition, medications they are taking, and healthy lifestyle.  SUBJECTIVE  Sabina has improved from yesterday. Pain is controlled. She has no nausea and no vomiting NGT in place.  She has passed flatus and has not had a bowel movement. She is tolerating NPO. Current activity is ad annie    OBJECTIVE  VITALS:  height is 1.626 m (5' 4.02\") and weight is 64.5 kg (142 lb 3.2 oz). Her oral temperature is 98 °F (36.7 °C). Her blood pressure is 123/66 and her pulse is 91. Her respiration is 16 and oxygen saturation is 93%. VITALS:  /66   Pulse 91   Temp 98 °F (36.7 °C) (Oral)   Resp 16   Ht 1.626 m (5' 4.02\")   Wt 64.5 kg (142 lb 3.2 oz)   LMP 03/07/2017   SpO2 93%   BMI

## 2024-12-10 NOTE — PROGRESS NOTES
Pt's blood sugar 62. Call made to Claudio SIMONS with general surgery, order received for hypoglycemia protocol.

## 2024-12-10 NOTE — PLAN OF CARE
Problem: Discharge Planning  Goal: Discharge to home or other facility with appropriate resources  Outcome: Progressing  Flowsheets (Taken 12/10/2024 1857)  Discharge to home or other facility with appropriate resources:   Identify barriers to discharge with patient and caregiver   Arrange for needed discharge resources and transportation as appropriate     Problem: Pain  Goal: Verbalizes/displays adequate comfort level or baseline comfort level  Outcome: Progressing  Flowsheets (Taken 12/10/2024 1857)  Verbalizes/displays adequate comfort level or baseline comfort level:   Encourage patient to monitor pain and request assistance   Assess pain using appropriate pain scale     Problem: Safety - Adult  Goal: Free from fall injury  Outcome: Progressing  Flowsheets (Taken 12/10/2024 1857)  Free From Fall Injury: Instruct family/caregiver on patient safety     Problem: Skin/Tissue Integrity  Goal: Absence of new skin breakdown  Description: 1.  Monitor for areas of redness and/or skin breakdown  2.  Assess vascular access sites hourly  3.  Every 4-6 hours minimum:  Change oxygen saturation probe site  4.  Every 4-6 hours:  If on nasal continuous positive airway pressure, respiratory therapy assess nares and determine need for appliance change or resting period.  Outcome: Progressing       Problem: Respiratory - Adult  Goal: Achieves optimal ventilation and oxygenation  Outcome: Progressing  Flowsheets (Taken 12/10/2024 1857)  Achieves optimal ventilation and oxygenation:   Assess for changes in respiratory status   Assess for changes in mentation and behavior     Problem: Skin/Tissue Integrity - Adult  Goal: Incisions, wounds, or drain sites healing without S/S of infection  Outcome: Progressing     Problem: Gastrointestinal - Adult  Goal: Minimal or absence of nausea and vomiting  Outcome: Progressing  Flowsheets (Taken 12/10/2024 1857)  Minimal or absence of nausea and vomiting:   Administer IV fluids as ordered to  ensure adequate hydration   Maintain NPO status until nausea and vomiting are resolved     Problem: Genitourinary - Adult  Goal: Absence of urinary retention  Outcome: Progressing  Flowsheets (Taken 12/10/2024 1857)  Absence of urinary retention:   Assess patient’s ability to void and empty bladder   Monitor intake/output and perform bladder scan as needed     Problem: Infection - Adult  Goal: Absence of infection during hospitalization  Outcome: Progressing  Flowsheets (Taken 12/10/2024 1857)  Absence of infection during hospitalization:   Assess and monitor for signs and symptoms of infection   Monitor lab/diagnostic results     Problem: Nutrition Deficit:  Goal: Optimize nutritional status  Outcome: Progressing

## 2024-12-11 LAB
GLUCOSE BLD-MCNC: 109 MG/DL (ref 70–99)
GLUCOSE BLD-MCNC: 163 MG/DL (ref 70–99)
PERFORMED ON: ABNORMAL
PERFORMED ON: ABNORMAL

## 2024-12-11 PROCEDURE — 6360000002 HC RX W HCPCS: Performed by: SURGERY

## 2024-12-11 PROCEDURE — 97530 THERAPEUTIC ACTIVITIES: CPT

## 2024-12-11 PROCEDURE — 2000000000 HC ICU R&B

## 2024-12-11 PROCEDURE — APPSS15 APP SPLIT SHARED TIME 0-15 MINUTES: Performed by: PHYSICIAN ASSISTANT

## 2024-12-11 PROCEDURE — 99024 POSTOP FOLLOW-UP VISIT: CPT | Performed by: PHYSICIAN ASSISTANT

## 2024-12-11 PROCEDURE — 2580000003 HC RX 258: Performed by: SURGERY

## 2024-12-11 PROCEDURE — 94760 N-INVAS EAR/PLS OXIMETRY 1: CPT

## 2024-12-11 PROCEDURE — 2500000003 HC RX 250 WO HCPCS: Performed by: SURGERY

## 2024-12-11 PROCEDURE — APPNB15 APP NON BILLABLE TIME 0-15 MINS: Performed by: PHYSICIAN ASSISTANT

## 2024-12-11 RX ADMIN — KETOROLAC TROMETHAMINE 15 MG: 15 INJECTION, SOLUTION INTRAMUSCULAR; INTRAVENOUS at 20:37

## 2024-12-11 RX ADMIN — SODIUM CHLORIDE, PRESERVATIVE FREE 10 ML: 5 INJECTION INTRAVENOUS at 20:37

## 2024-12-11 RX ADMIN — ENOXAPARIN SODIUM 40 MG: 100 INJECTION SUBCUTANEOUS at 08:40

## 2024-12-11 RX ADMIN — KETOROLAC TROMETHAMINE 15 MG: 15 INJECTION, SOLUTION INTRAMUSCULAR; INTRAVENOUS at 03:40

## 2024-12-11 RX ADMIN — POTASSIUM CHLORIDE, DEXTROSE MONOHYDRATE AND SODIUM CHLORIDE: 224; 5; 450 INJECTION, SOLUTION INTRAVENOUS at 16:07

## 2024-12-11 RX ADMIN — KETOROLAC TROMETHAMINE 15 MG: 15 INJECTION, SOLUTION INTRAMUSCULAR; INTRAVENOUS at 14:26

## 2024-12-11 RX ADMIN — POTASSIUM CHLORIDE, DEXTROSE MONOHYDRATE AND SODIUM CHLORIDE: 224; 5; 450 INJECTION, SOLUTION INTRAVENOUS at 06:04

## 2024-12-11 ASSESSMENT — PAIN DESCRIPTION - PAIN TYPE
TYPE: SURGICAL PAIN;ACUTE PAIN
TYPE: ACUTE PAIN;SURGICAL PAIN
TYPE: SURGICAL PAIN;ACUTE PAIN
TYPE: ACUTE PAIN;SURGICAL PAIN

## 2024-12-11 ASSESSMENT — PAIN DESCRIPTION - LOCATION
LOCATION: ABDOMEN

## 2024-12-11 ASSESSMENT — PAIN - FUNCTIONAL ASSESSMENT
PAIN_FUNCTIONAL_ASSESSMENT: PREVENTS OR INTERFERES SOME ACTIVE ACTIVITIES AND ADLS

## 2024-12-11 ASSESSMENT — PAIN DESCRIPTION - FREQUENCY
FREQUENCY: CONTINUOUS
FREQUENCY: CONTINUOUS

## 2024-12-11 ASSESSMENT — PAIN DESCRIPTION - ONSET
ONSET: ON-GOING
ONSET: ON-GOING

## 2024-12-11 ASSESSMENT — PAIN DESCRIPTION - ORIENTATION
ORIENTATION: RIGHT
ORIENTATION: RIGHT;LEFT
ORIENTATION: MID
ORIENTATION: RIGHT;LEFT

## 2024-12-11 ASSESSMENT — PAIN DESCRIPTION - DESCRIPTORS
DESCRIPTORS: ACHING;DISCOMFORT;STABBING
DESCRIPTORS: ACHING;DISCOMFORT
DESCRIPTORS: ACHING
DESCRIPTORS: ACHING

## 2024-12-11 ASSESSMENT — PAIN SCALES - GENERAL
PAINLEVEL_OUTOF10: 7
PAINLEVEL_OUTOF10: 5
PAINLEVEL_OUTOF10: 8
PAINLEVEL_OUTOF10: 9

## 2024-12-11 NOTE — PROGRESS NOTES
General and Vascular Surgery                                                           Daily Progress Note                                                             Claudio Estrella PA-C    Pt Name: Sabina Rios  Medical Record Number: [id number]  Date of Birth 1965   Today's Date: 12/11/2024    ASSESSMENT/PLAN  S/P (12/8/24):Reopening of recent laparotomy with abdominal exploration for treatment of early small bowel obstruction following recent colectomy.   S/P (12/4/24): Laparoscopic converted to open resection of descending and sigmoid colon with coloproctostomy.   Pain being monitored and Controlled  WBC count WNL: 8.5-->8.3 (yesterday). Awaiting AM labs  NGT removed yesterday  Denies any nausea or emesis  +flatulence denies any BM  Tolerating clear liquid diet. Will advance diet to full liquids as tolerated   OOB as tolerated. Sitting up in chair this morning  EDUCATION  Patient educated about their illness/diagnosis, stated above, and all questions answered. We discussed the importance of nutrition, medications they are taking, and healthy lifestyle.  SUBJECTIVE  Sabina has improved from yesterday. Pain is controlled. She has no nausea and no vomiting .  She has passed flatus and has not had a bowel movement. She is tolerating clear liquids. Current activity is ad annie    OBJECTIVE  VITALS:  height is 1.626 m (5' 4.02\") and weight is 64.2 kg (141 lb 8.6 oz). Her oral temperature is 98.9 °F (37.2 °C). Her blood pressure is 124/70 and her pulse is 82. Her respiration is 16 and oxygen saturation is 98%. VITALS:  /70   Pulse 82   Temp 98.9 °F (37.2 °C) (Oral)   Resp 16   Ht 1.626 m (5' 4.02\")   Wt 64.2 kg (141 lb 8.6 oz)   LMP 03/07/2017   SpO2 98%   BMI 24.28 kg/m²   GENERAL: alert, no distress  LUNGS: clear to ausculation, without wheezes, rales or rhonci  HEART: normal rate and regular rhythm  ABDOMEN: soft, incisional

## 2024-12-11 NOTE — PLAN OF CARE
Problem: Discharge Planning  Goal: Discharge to home or other facility with appropriate resources  12/11/2024 1147 by Sindhu Baldwin, RN  Outcome: Progressing  Flowsheets (Taken 12/11/2024 1147)  Discharge to home or other facility with appropriate resources:   Identify barriers to discharge with patient and caregiver   Arrange for needed discharge resources and transportation as appropriate     Problem: Pain  Goal: Verbalizes/displays adequate comfort level or baseline comfort level  12/11/2024 1147 by Sindhu Baldwin, RN  Outcome: Progressing  Flowsheets (Taken 12/11/2024 1147)  Verbalizes/displays adequate comfort level or baseline comfort level:   Encourage patient to monitor pain and request assistance   Assess pain using appropriate pain scale     Problem: Safety - Adult  Goal: Free from fall injury  12/11/2024 1147 by Sindhu Baldwin, RN  Outcome: Progressing  Flowsheets (Taken 12/11/2024 1147)  Free From Fall Injury: Instruct family/caregiver on patient safety     Problem: Skin/Tissue Integrity  Goal: Absence of new skin breakdown  Description: 1.  Monitor for areas of redness and/or skin breakdown  2.  Assess vascular access sites hourly  3.  Every 4-6 hours minimum:  Change oxygen saturation probe site  4.  Every 4-6 hours:  If on nasal continuous positive airway pressure, respiratory therapy assess nares and determine need for appliance change or resting period.  12/11/2024 1147 by Sindhu Baldwin, RN  Outcome: Progressing     Problem: Respiratory - Adult  Goal: Achieves optimal ventilation and oxygenation  12/11/2024 1147 by Sindhu Baldwin, RN  Outcome: Progressing  Flowsheets (Taken 12/11/2024 1147)  Achieves optimal ventilation and oxygenation:   Assess for changes in respiratory status   Assess for changes in mentation and behavior   Oxygen supplementation based on oxygen saturation or arterial blood gases     Problem: Skin/Tissue Integrity - Adult  Goal: Incisions, wounds, or drain  sites healing without S/S of infection  12/11/2024 1147 by Sindhu Baldwin, RN  Outcome: Progressing     Problem: Gastrointestinal - Adult  Goal: Minimal or absence of nausea and vomiting  12/11/2024 1147 by Sindhu Baldwin, RN  Outcome: Progressing  Flowsheets (Taken 12/11/2024 1147)  Minimal or absence of nausea and vomiting:   Administer IV fluids as ordered to ensure adequate hydration   Maintain NPO status until nausea and vomiting are resolved     Problem: Genitourinary - Adult  Goal: Absence of urinary retention  12/11/2024 1147 by Sindhu Baldwin, RN  Outcome: Progressing  Flowsheets (Taken 12/11/2024 1147)  Absence of urinary retention: Assess patient’s ability to void and empty bladder     Problem: Infection - Adult  Goal: Absence of infection during hospitalization  12/11/2024 1147 by Sindhu Baldwin, RN  Outcome: Progressing  Flowsheets (Taken 12/11/2024 1147)  Absence of infection during hospitalization: Monitor lab/diagnostic results

## 2024-12-11 NOTE — PROGRESS NOTES
Physical Therapy  Facility/Department: [address] ICU  Physical Therapy Treatment Note    Name: Sabina Rios  : 1965  MRN: [id number]  Date of Service: 2024    Discharge Recommendations:  Continue to assess pending progress (Anticipate adequate progress for d/c home; denies home care needs.)   PT Equipment Recommendations  Other: Family obtained Rolling Walker.  Sabina Rios scored a 17/24 on the AM-PAC short mobility form.  At this time, no further PT is recommended upon discharge.  Assessment  Body Structures, Functions, Activity Limitations Requiring Skilled Therapeutic Intervention: Decreased functional mobility ;Decreased endurance;Increased pain  Assessment: 60 y/o female admit 2024 with Recurring Diverticulitis of Descending and Sigmoid Colons. 2024 S/P Lap converted to Open Resection of Descending and Sigmoid Colon with Coloproctostomy.    2024 Return to OR : Early SBO following recent Colectomy : Reopening of Recent Laparotomy with Abd Exploration.  PMH Recurring Diverticulitis, IBS, R Wrist Surg.  PTA pt living with  in house with few steps to enter and 1st floor bed/bath; independent daily care and functional mobility (working here at [employer]).  Pt currently able to Transfers / Amb further functional distances with Walker SBA. Return to bed with Mod assist via Sidelying.  Pt progressing well.  Mvt overall becoming less guarded; only occass cues needed.   Pt/family appreciative of care.  At this time, anticipate d/c home with adequate family assist/support.  Will cont to monitor pt's progress.  Therapy Prognosis: Good  History: 60 y/o female admit 2024 with Recurring Diverticulitis of Descending and Sigmoid Colons. 2024 S/P Lap converted to Open Resection of Descending and Sigmoid Colon with Coloproctostomy.  2024 Return to OR : Early SBO following recent Colectomy : Reopening of Recent Laparotomy with Abd Exploration.   PMH Recurring  patient had two or more falls in the past year or any fall with injury in the past year?: No  Prior Level of Assist for ADLs: Independent  Prior Level of Assist for Homemaking: Independent  Prior Level of Assist for Ambulation: Independent household ambulator, with or without device (Without assist device pta.)  Prior Level of Assist for Transfers: Independent  Active : Yes  Occupation: Full time employment  Type of Occupation: Works : [employer].  Additional Comments: Family obtained walker for use as needed upon d/c.  Vision/Hearing  Vision  Vision: Within Functional Limits  Hearing  Hearing: Within functional limits    Cognition   Orientation  Overall Orientation Status: Within Functional Limits  Cognition  Overall Cognitive Status: WFL    Objective                              Bed mobility  Supine to Sit: Unable to assess (Pt oob prior PT arrival.)  Sit to Supine: Moderate assistance (From sidelying.)  Transfers  Sit to Stand: Stand by assistance (With Walker.)  Stand to Sit: Stand by assistance (With Walker.)  Ambulation  Surface: Level tile  Device: Rolling Walker  Distance: Pt amb within unit ~120' x 2 with Walker SBA.  Gait slow/guarded although improving billy.  More upright posture.  Billy well.                           -PAC - Mobility    AM-PAC Basic Mobility - Inpatient   How much help is needed turning from your back to your side while in a flat bed without using bedrails?: A Little  How much help is needed moving from lying on your back to sitting on the side of a flat bed without using bedrails?: A Lot  How much help is needed moving to and from a bed to a chair?: A Little  How much help is needed standing up from a chair using your arms?: A Little  How much help is needed walking in hospital room?: A Little  How much help is needed climbing 3-5 steps with a railing?: A Little  -[facility] Inpatient Mobility Raw Score : 17  AM-PAC Inpatient T-Scale Score : 42.13  Mobility Inpatient CMS 0-100% Score:

## 2024-12-11 NOTE — PLAN OF CARE
Problem: Discharge Planning  Goal: Discharge to home or other facility with appropriate resources  12/10/2024 2247 by Eleonora Stevens, RN  Outcome: Progressing  Flowsheets (Taken 12/10/2024 2000)  Discharge to home or other facility with appropriate resources:   Identify barriers to discharge with patient and caregiver   Arrange for needed discharge resources and transportation as appropriate   Identify discharge learning needs (meds, wound care, etc)   Refer to discharge planning if patient needs post-hospital services based on physician order or complex needs related to functional status, cognitive ability or social support system  12/10/2024 1857 by Sindhu Baldwin, RN  Outcome: Progressing  Flowsheets (Taken 12/10/2024 1857)  Discharge to home or other facility with appropriate resources:   Identify barriers to discharge with patient and caregiver   Arrange for needed discharge resources and transportation as appropriate     Problem: Pain  Goal: Verbalizes/displays adequate comfort level or baseline comfort level  12/10/2024 2247 by Eleonora Stevens RN  Outcome: Progressing  Flowsheets (Taken 12/10/2024 2000)  Verbalizes/displays adequate comfort level or baseline comfort level:   Encourage patient to monitor pain and request assistance   Assess pain using appropriate pain scale   Administer analgesics based on type and severity of pain and evaluate response   Implement non-pharmacological measures as appropriate and evaluate response   Consider cultural and social influences on pain and pain management   Notify Licensed Independent Practitioner if interventions unsuccessful or patient reports new pain  12/10/2024 1857 by Sindhu Baldwin, RN  Outcome: Progressing  Flowsheets (Taken 12/10/2024 1857)  Verbalizes/displays adequate comfort level or baseline comfort level:   Encourage patient to monitor pain and request assistance   Assess pain using appropriate pain scale     Problem: Safety - Adult  Goal:  by Eleonora Stevens, RN  Outcome: Progressing  12/10/2024 1857 by Sindhu Baldwin, RN  Outcome: Progressing

## 2024-12-11 NOTE — PROGRESS NOTES
Occupational Therapy  Facility/Department: [address] ICU  Occupational Therapy Daily Treatment    Name: Sabina Rios  : 1965  MRN: [id number]  Date of Service: 2024    Discharge Recommendations:  24 hour supervision or assist, Home with assist PRN, Continue to assess pending progress (may need initial  though may progress to PRN A)     Sabina Rios scored a 18 on the -[facility] ADL Inpatient form.         Patient Diagnosis(es): The encounter diagnosis was Diverticulitis.  Past Medical History:  has a past medical history of Anesthesia, Arthritis, B12 deficiency, Bilateral hearing loss, Diverticulitis, Dysthymia, Family history of colon cancer, GERD (gastroesophageal reflux disease), Hiatal hernia, HLD (hyperlipidemia), Irritable bowel syndrome with constipation, and Tinnitus.  Past Surgical History:  has a past surgical history that includes Wrist surgery (Right, ); Breast surgery (); Tonsillectomy; Endometrial ablation; Colonoscopy (2018); Upper gastrointestinal endoscopy (N/A, 2024); sigmoidoscopy (N/A, 2024); Upper gastrointestinal endoscopy (N/A, 2024); Sigmoid Colectomy (N/A, 2024); and laparotomy (N/A, 2024).    Treatment Diagnosis: Decreased: ADLs, functional transfers/mobility      Assessment  Performance deficits / Impairments: Decreased functional mobility ;Decreased ADL status  Assessment: Pt is a 60 yo F who presented with Recurring diverticulitis of the descending and sigmoid colons and is s/p Laparoscopic converted to open resection of descending and sigmoid colon with coloproctostomy. On , pt underwent reopening of recent laparotomy w/ abd exploration.  At baseline, pt lives at home with her  where she is typically independent, works FT, drives. Pt remains slightly below baseline but showing good improvement. Pt completed functional transfers and mobility SBA using RW but required mod A for bed mobility. Pt completes ADLs in the

## 2024-12-11 NOTE — PROGRESS NOTES
Dr Thayer at bedside. Order received to decrease ketamine gtt to 0.075mg/kg/hr. Labs scheduled for tomorrow morning (okay with every other day labs). Full liquid diet ordered.

## 2024-12-11 NOTE — PROGRESS NOTES
NAME:  Sabina Rios  YOB: 1965  MEDICAL RECORD NUMBER:  [id number]    Shift Summary: Uneventful shift overall. PRN ativan and toradol given x1. Up with walker to bathroom. Tolerated well. No PO intake, even ice chips overnight.    Family updated: Yes:  Pt updates    Rhythm: Normal Sinus Rhythm     Most recent vitals:   Visit Vitals  BP (!) 101/56   Pulse 74   Temp 98.8 °F (37.1 °C) (Oral)   Resp 14   Ht 1.626 m (5' 4.02\")   Wt 64.2 kg (141 lb 8.6 oz)   SpO2 97%   BMI 24.28 kg/m²           No data found.    No data found.      Respiratory support needed (if any):  - RA    Admission weight Weight - Scale: 64.9 kg (143 lb) (11/14/24 1328)    Today's weight    Wt Readings from Last 1 Encounters:   12/11/24 64.2 kg (141 lb 8.6 oz)        Scott need assessed each shift: N/A - no scott present  UOP >30ml/hr: YES  Last documented BM (in last 48 hrs):  Patient Vitals for the past 48 hrs:   Last BM (including prior to admit)   12/09/24 2000 12/07/24   12/10/24 2000 12/07/24                Restraints (in use currently or dc'd in last 12 hrs): No    Order current and documentation up to date? No    Lines/Drains reviewed @ bedside.  Peripheral IV 12/10/24 Left Forearm (Active)   Number of days: 1       Peripheral IV 12/10/24 Left;Proximal Forearm (Active)   Number of days: 0         Drip rates at handoff:    dextrose      dextrose 5% and 0.45% NaCl with KCl 30 mEq 100 mL/hr at 12/11/24 0604    ketamine (KETALAR) 500 mg in sodium chloride 0.9 % 100 mL infusion 0.125 mg/kg/hr (12/11/24 0546)    sodium chloride         Lab Data:   CBC:   Recent Labs     12/09/24  0459 12/10/24  0447   WBC 8.5 8.3   HGB 11.7* 11.5*   HCT 34.8* 34.7*   MCV 92.8 92.6    386     BMP:    Recent Labs     12/09/24  0459 12/10/24  0447    138   K 3.4* 3.5   CO2 23 17*   BUN 7 7   CREATININE 0.5* 0.4*     LIVR: No results for input(s): \"AST\", \"ALT\" in the last 72 hours.  PT/INR: No results for input(s): \"INR\" in the last 72

## 2024-12-12 LAB
ANION GAP SERPL CALCULATED.3IONS-SCNC: 6 MMOL/L (ref 3–16)
BASOPHILS # BLD: 0.1 K/UL (ref 0–0.2)
BASOPHILS NFR BLD: 0.8 %
BUN SERPL-MCNC: 4 MG/DL (ref 7–20)
CALCIUM SERPL-MCNC: 7.8 MG/DL (ref 8.3–10.6)
CHLORIDE SERPL-SCNC: 110 MMOL/L (ref 99–110)
CO2 SERPL-SCNC: 22 MMOL/L (ref 21–32)
CREAT SERPL-MCNC: 0.4 MG/DL (ref 0.6–1.1)
DEPRECATED RDW RBC AUTO: 13.7 % (ref 12.4–15.4)
EOSINOPHIL # BLD: 0.3 K/UL (ref 0–0.6)
EOSINOPHIL NFR BLD: 4.3 %
GFR SERPLBLD CREATININE-BSD FMLA CKD-EPI: >90 ML/MIN/{1.73_M2}
GLUCOSE SERPL-MCNC: 123 MG/DL (ref 70–99)
HCT VFR BLD AUTO: 33.5 % (ref 36–48)
HGB BLD-MCNC: 11.6 G/DL (ref 12–16)
LYMPHOCYTES # BLD: 1.5 K/UL (ref 1–5.1)
LYMPHOCYTES NFR BLD: 21.6 %
MCH RBC QN AUTO: 31.6 PG (ref 26–34)
MCHC RBC AUTO-ENTMCNC: 34.6 G/DL (ref 31–36)
MCV RBC AUTO: 91.4 FL (ref 80–100)
MONOCYTES # BLD: 0.9 K/UL (ref 0–1.3)
MONOCYTES NFR BLD: 12.8 %
NEUTROPHILS # BLD: 4.2 K/UL (ref 1.7–7.7)
NEUTROPHILS NFR BLD: 60.5 %
PLATELET # BLD AUTO: 415 K/UL (ref 135–450)
PMV BLD AUTO: 7.8 FL (ref 5–10.5)
POTASSIUM SERPL-SCNC: 4.4 MMOL/L (ref 3.5–5.1)
RBC # BLD AUTO: 3.67 M/UL (ref 4–5.2)
SODIUM SERPL-SCNC: 138 MMOL/L (ref 136–145)
WBC # BLD AUTO: 6.9 K/UL (ref 4–11)

## 2024-12-12 PROCEDURE — 6370000000 HC RX 637 (ALT 250 FOR IP): Performed by: SURGERY

## 2024-12-12 PROCEDURE — 2500000003 HC RX 250 WO HCPCS: Performed by: SURGERY

## 2024-12-12 PROCEDURE — APPNB15 APP NON BILLABLE TIME 0-15 MINS: Performed by: PHYSICIAN ASSISTANT

## 2024-12-12 PROCEDURE — 6360000002 HC RX W HCPCS: Performed by: SURGERY

## 2024-12-12 PROCEDURE — 94760 N-INVAS EAR/PLS OXIMETRY 1: CPT

## 2024-12-12 PROCEDURE — 85025 COMPLETE CBC W/AUTO DIFF WBC: CPT

## 2024-12-12 PROCEDURE — 2060000000 HC ICU INTERMEDIATE R&B

## 2024-12-12 PROCEDURE — 80048 BASIC METABOLIC PNL TOTAL CA: CPT

## 2024-12-12 PROCEDURE — 2580000003 HC RX 258: Performed by: SURGERY

## 2024-12-12 PROCEDURE — 36415 COLL VENOUS BLD VENIPUNCTURE: CPT

## 2024-12-12 RX ADMIN — KETOROLAC TROMETHAMINE 15 MG: 15 INJECTION, SOLUTION INTRAMUSCULAR; INTRAVENOUS at 16:18

## 2024-12-12 RX ADMIN — ACETAMINOPHEN 1000 MG: 500 TABLET ORAL at 19:31

## 2024-12-12 RX ADMIN — KETOROLAC TROMETHAMINE 15 MG: 15 INJECTION, SOLUTION INTRAMUSCULAR; INTRAVENOUS at 22:23

## 2024-12-12 RX ADMIN — SODIUM CHLORIDE, PRESERVATIVE FREE 10 ML: 5 INJECTION INTRAVENOUS at 02:44

## 2024-12-12 RX ADMIN — ENOXAPARIN SODIUM 40 MG: 100 INJECTION SUBCUTANEOUS at 10:50

## 2024-12-12 RX ADMIN — SODIUM CHLORIDE, PRESERVATIVE FREE 10 ML: 5 INJECTION INTRAVENOUS at 22:24

## 2024-12-12 RX ADMIN — ACETAMINOPHEN 1000 MG: 500 TABLET ORAL at 12:19

## 2024-12-12 RX ADMIN — POTASSIUM CHLORIDE, DEXTROSE MONOHYDRATE AND SODIUM CHLORIDE: 224; 5; 450 INJECTION, SOLUTION INTRAVENOUS at 11:22

## 2024-12-12 RX ADMIN — POTASSIUM CHLORIDE, DEXTROSE MONOHYDRATE AND SODIUM CHLORIDE: 224; 5; 450 INJECTION, SOLUTION INTRAVENOUS at 01:38

## 2024-12-12 RX ADMIN — BUPROPION HYDROCHLORIDE 300 MG: 150 TABLET, EXTENDED RELEASE ORAL at 10:50

## 2024-12-12 RX ADMIN — POTASSIUM CHLORIDE, DEXTROSE MONOHYDRATE AND SODIUM CHLORIDE: 224; 5; 450 INJECTION, SOLUTION INTRAVENOUS at 21:29

## 2024-12-12 RX ADMIN — KETOROLAC TROMETHAMINE 15 MG: 15 INJECTION, SOLUTION INTRAMUSCULAR; INTRAVENOUS at 02:43

## 2024-12-12 RX ADMIN — KETOROLAC TROMETHAMINE 15 MG: 15 INJECTION, SOLUTION INTRAMUSCULAR; INTRAVENOUS at 09:58

## 2024-12-12 ASSESSMENT — PAIN DESCRIPTION - ONSET
ONSET: ON-GOING

## 2024-12-12 ASSESSMENT — PAIN DESCRIPTION - FREQUENCY
FREQUENCY: CONTINUOUS

## 2024-12-12 ASSESSMENT — PAIN DESCRIPTION - DESCRIPTORS
DESCRIPTORS: DISCOMFORT
DESCRIPTORS: ACHING
DESCRIPTORS: ACHING;SHARP
DESCRIPTORS: ACHING;SHARP
DESCRIPTORS: ACHING
DESCRIPTORS: ACHING
DESCRIPTORS: SHARP;ACHING
DESCRIPTORS: SHARP;ACHING
DESCRIPTORS: ACHING

## 2024-12-12 ASSESSMENT — PAIN DESCRIPTION - ORIENTATION
ORIENTATION: RIGHT;LEFT
ORIENTATION: MID
ORIENTATION: RIGHT;LEFT;MID
ORIENTATION: RIGHT;LEFT
ORIENTATION: MID
ORIENTATION: ANTERIOR
ORIENTATION: MID

## 2024-12-12 ASSESSMENT — PAIN SCALES - GENERAL
PAINLEVEL_OUTOF10: 6
PAINLEVEL_OUTOF10: 7
PAINLEVEL_OUTOF10: 7
PAINLEVEL_OUTOF10: 6
PAINLEVEL_OUTOF10: 5
PAINLEVEL_OUTOF10: 6
PAINLEVEL_OUTOF10: 6
PAINLEVEL_OUTOF10: 5
PAINLEVEL_OUTOF10: 7

## 2024-12-12 ASSESSMENT — PAIN - FUNCTIONAL ASSESSMENT

## 2024-12-12 ASSESSMENT — PAIN DESCRIPTION - PAIN TYPE
TYPE: SURGICAL PAIN
TYPE: ACUTE PAIN;CHRONIC PAIN
TYPE: SURGICAL PAIN;ACUTE PAIN
TYPE: SURGICAL PAIN
TYPE: SURGICAL PAIN

## 2024-12-12 ASSESSMENT — PAIN DESCRIPTION - LOCATION
LOCATION: ABDOMEN
LOCATION: INCISION
LOCATION: ABDOMEN

## 2024-12-12 NOTE — PROGRESS NOTES
NAME:  Sabina Rios  YOB: 1965  MEDICAL RECORD NUMBER:  [id number]    Shift Summary: Ketamine gtt decreased. Full liquid diet ordered. Ambulated around hallway multiple times with walker. Tolerated well.     Family updated: Yes:  pt's  and sons at bedside during shift    Rhythm: Normal Sinus Rhythm     Most recent vitals:   Visit Vitals  /66   Pulse 84   Temp 99.1 °F (37.3 °C) (Oral)   Resp 18   Ht 1.626 m (5' 4.02\")   Wt 64.2 kg (141 lb 8.6 oz)   SpO2 100%   BMI 24.28 kg/m²           Respiratory support needed (if any):  - RA    Admission weight Weight - Scale: 64.9 kg (143 lb) (11/14/24 1328)    Today's weight    Wt Readings from Last 1 Encounters:   12/11/24 64.2 kg (141 lb 8.6 oz)        Scott need assessed each shift: N/A - no scott present  UOP >30ml/hr: YES  Last documented BM (in last 48 hrs):  Patient Vitals for the past 48 hrs:   Last BM (including prior to admit)   12/09/24 2000 12/07/24   12/10/24 2000 12/07/24   12/11/24 0800 12/07/24                Restraints (in use currently or dc'd in last 12 hrs): No    Order current and documentation up to date? N/A    Lines/Drains reviewed @ bedside.  Peripheral IV 12/10/24 Left Forearm (Active)   Number of days: 1       Peripheral IV 12/10/24 Left;Proximal Forearm (Active)   Number of days: 1         Drip rates at handoff:    ketamine (KETALAR) 500 mg in sodium chloride 0.9 % 100 mL infusion 0.075 mg/kg/hr (12/11/24 1016)    dextrose      dextrose 5% and 0.45% NaCl with KCl 30 mEq 100 mL/hr at 12/11/24 1607    sodium chloride         Lab Data:   CBC:   Recent Labs     12/09/24  0459 12/10/24  0447   WBC 8.5 8.3   HGB 11.7* 11.5*   HCT 34.8* 34.7*   MCV 92.8 92.6    386     BMP:    Recent Labs     12/09/24  0459 12/10/24  0447    138   K 3.4* 3.5   CO2 23 17*   BUN 7 7   CREATININE 0.5* 0.4*     LIVR: No results for input(s): \"AST\", \"ALT\" in the last 72 hours.  PT/INR: No results for input(s): \"INR\" in the last 72

## 2024-12-12 NOTE — PROGRESS NOTES
General and Vascular Surgery                                                           Daily Progress Note                                                             Claudio Estrella PA-C    Pt Name: Sabina Rios  Medical Record Number: [id number]  Date of Birth 1965   Today's Date: 12/12/2024    ASSESSMENT/PLAN  S/P (12/8/24):Reopening of recent laparotomy with abdominal exploration for treatment of early small bowel obstruction following recent colectomy.   S/P (12/4/24): Laparoscopic converted to open resection of descending and sigmoid colon with coloproctostomy.   Pain being monitored and Controlled  Up waling in [facility] this AM  WBC count WNL: 8.5-->8.3-->6.9  Denies any nausea or emesis  +flatulence denies any BM  Tolerating some regular diet. Had a piece of a biscuit this AM  OOB as tolerated.   EDUCATION  Patient educated about their illness/diagnosis, stated above, and all questions answered. We discussed the importance of nutrition, medications they are taking, and healthy lifestyle.  SUBJECTIVE  Sabina has improved from yesterday. Pain is controlled. She has no nausea and no vomiting .  She has passed flatus and has not had a bowel movement. She is tolerating regular diet. Current activity is ad annie    OBJECTIVE  VITALS:  height is 1.626 m (5' 4\") and weight is 63 kg (138 lb 14.2 oz). Her oral temperature is 99.1 °F (37.3 °C). Her blood pressure is 123/65 and her pulse is 74. Her respiration is 17 and oxygen saturation is 98%. VITALS:  /65   Pulse 74   Temp 99.1 °F (37.3 °C) (Oral)   Resp 17   Ht 1.626 m (5' 4\")   Wt 63 kg (138 lb 14.2 oz)   LMP 03/07/2017   SpO2 98%   BMI 23.84 kg/m²   GENERAL: alert, no distress  LUNGS: clear to ausculation, without wheezes, rales or rhonci  HEART: normal rate and regular rhythm  ABDOMEN: soft, incisional tenderness, non-distended. Incision site healing well. No significant erythema. No

## 2024-12-12 NOTE — PROGRESS NOTES
Outcomes: Biochemical Data, Weight, Nutrition Focused Physical Findings, GI Status    Discharge Planning:    No discharge needs at this time     Ronald Baker RD  Contact: [phone]

## 2024-12-12 NOTE — PLAN OF CARE
Problem: Discharge Planning  Goal: Discharge to home or other facility with appropriate resources  Outcome: Progressing  Flowsheets (Taken 12/12/2024 0808)  Discharge to home or other facility with appropriate resources:   Identify barriers to discharge with patient and caregiver   Arrange for needed discharge resources and transportation as appropriate   Identify discharge learning needs (meds, wound care, etc)     Problem: ABCDS Injury Assessment  Goal: Absence of physical injury  12/12/2024 1435 by Blaine Sawyer RN  Outcome: Progressing     Problem: Skin/Tissue Integrity - Adult  Goal: Incisions, wounds, or drain sites healing without S/S of infection  12/12/2024 1435 by Blaine Sawyer RN  Outcome: Progressing     Problem: Infection - Adult  Goal: Absence of infection during hospitalization  12/12/2024 1435 by Blaine Sawyer RN  Outcome: Progressing  Flowsheets (Taken 12/12/2024 0808)  Absence of infection during hospitalization:   Monitor lab/diagnostic results   Assess and monitor for signs and symptoms of infection   Monitor all insertion sites i.e., indwelling lines, tubes and drains   Administer medications as ordered   [place] appropriate cooling/warming therapies per order   Instruct and encourage patient and family to use good hand hygiene technique   Identify and instruct in appropriate isolation precautions for identified infection/condition     Problem: Metabolic/Fluid and Electrolytes - Adult  Goal: Electrolytes maintained within normal limits  12/12/2024 1435 by Blaine Sawyer RN  Outcome: Progressing  Flowsheets (Taken 12/12/2024 0808)  Electrolytes maintained within normal limits:   Monitor labs and assess patient for signs and symptoms of electrolyte imbalances   Administer electrolyte replacement as ordered   Monitor response to electrolyte replacements, including repeat lab results as appropriate   Fluid restriction as ordered     Problem: Nutrition Deficit:  Goal: Optimize nutritional

## 2024-12-12 NOTE — PROGRESS NOTES
NAME:  Sabina Rios  YOB: 1965  MEDICAL RECORD NUMBER:  [id number]    Shift Summary: Diet advanced, intake improving slowly pt states she still feels very full. Ketamine gtt discontinued, pt rating patient 6/10 with current medications. Patient walked and completed laps around the unit several times today w/ walker.    Family updated: Yes:   visited this afternoon    Rhythm: Normal Sinus Rhythm     Most recent vitals:   Visit Vitals  /71   Pulse 73   Temp 98.1 °F (36.7 °C) (Axillary)   Resp 14   Ht 1.626 m (5' 4\")   Wt 63 kg (138 lb 14.2 oz)   SpO2 98%   BMI 23.84 kg/m²           No data found.    No data found.      Respiratory support needed (if any):  - RA    Admission weight Weight - Scale: 64.9 kg (143 lb) (11/14/24 1328)    Today's weight    Wt Readings from Last 1 Encounters:   12/12/24 63 kg (138 lb 14.2 oz)        Scott need assessed each shift: N/A - no scott present  UOP >30ml/hr: YES  Last documented BM (in last 48 hrs):  Patient Vitals for the past 48 hrs:   Last BM (including prior to admit) Stool Occurrence   12/10/24 2000 12/07/24 --   12/11/24 0800 12/07/24 --   12/11/24 2035 12/07/24 --   12/11/24 2200 -- 0   12/12/24 0600 -- 0   12/12/24 0800 12/07/24 --                Restraints (in use currently or dc'd in last 12 hrs): No    Order current and documentation up to date? No    Lines/Drains reviewed @ bedside.  Peripheral IV 12/12/24 Right Cephalic (Active)   Number of days: 0         Drip rates at handoff:    dextrose      dextrose 5% and 0.45% NaCl with KCl 30 mEq 100 mL/hr at 12/12/24 1747    sodium chloride         Lab Data:   CBC:   Recent Labs     12/10/24  0447 12/12/24  0353   WBC 8.3 6.9   HGB 11.5* 11.6*   HCT 34.7* 33.5*   MCV 92.6 91.4    415     BMP:    Recent Labs     12/10/24  0447 12/12/24  0353    138   K 3.5 4.4   CO2 17* 22   BUN 7 4*   CREATININE 0.4* 0.4*     LIVR: No results for input(s): \"AST\", \"ALT\" in the last 72 hours.  PT/INR:

## 2024-12-12 NOTE — PLAN OF CARE
Problem: Gastrointestinal - Adult  Goal: Minimal or absence of nausea and vomiting  Outcome: Progressing  Minimal or absence of nausea and vomiting:   Administer IV fluids as ordered to ensure adequate hydration   Administer ordered antiemetic medications as needed   Provide nonpharmacologic comfort measures as appropriate   Advance diet as tolerated, if ordered     Problem: Gastrointestinal - Adult  Goal: Maintains or returns to baseline bowel function  Outcome: Progressing  Maintains or returns to baseline bowel function:   Assess bowel function   Encourage oral fluids to ensure adequate hydration   Administer IV fluids as ordered to ensure adequate hydration   Administer ordered medications as needed   Encourage mobilization and activity     Problem: Pain  Goal: Verbalizes/displays adequate comfort level or baseline comfort level  Outcome: Progressing  Verbalizes/displays adequate comfort level or baseline comfort level:   Encourage patient to monitor pain and request assistance   Assess pain using appropriate pain scale   Administer analgesics based on type and severity of pain and evaluate response   Implement non-pharmacological measures as appropriate and evaluate response   Consider cultural and social influences on pain and pain management   Notify Licensed Independent Practitioner if interventions unsuccessful or patient reports new pain     Problem: Respiratory - Adult  Goal: Achieves optimal ventilation and oxygenation  Outcome: Progressing  Achieves optimal ventilation and oxygenation:   Assess for changes in respiratory status   Assess for changes in mentation and behavior   Position to facilitate oxygenation and minimize respiratory effort   Oxygen supplementation based on oxygen saturation or arterial blood gases   Encourage broncho-pulmonary hygiene including cough, deep breathe, incentive spirometry   Assess and instruct to report shortness of breath or any respiratory difficulty   Respiratory  physical injury  Outcome: Progressing  Absence of Physical Injury: Implement safety measures based on patient assessment    Electronically signed by Alisha Thibodeaux RN on 12/12/2024 at 8:07 AM

## 2024-12-12 NOTE — PROGRESS NOTES
NAME:  Sabina Rios  YOB: 1965  MEDICAL RECORD NUMBER:  [id number]    Shift Summary: She had an uneventful night. She denied any nausea, tolerated ice chips and water, had been passing gas, but no BM overnight. Pt. denied any need for PRN Ativan, but needed PRN Toradol twice for her \"aching\" abdominal pain. She had been up with the walker to the bathroom a few times last night. Bedside shift hand-off done w/ oncoming RN Blaine KAUFFMAN, who will assume pt. care. This RN handed-off the pt. in a stable condition.  Electronically signed by Alisha Thibodeaux RN on 12/12/2024 at 7:40 AM      Family updated: No. Pt. updates her family.    Rhythm: Normal Sinus Rhythm  w/ rare PVCs.    Most recent vitals:   Visit Vitals  /69   Pulse 71   Temp 98.9 °F (37.2 °C) (Oral)   Resp 16   Ht 1.626 m (5' 4\")   Wt 63 kg (138 lb 14.2 oz)   SpO2 97%   BMI 23.84 kg/m²           Respiratory support needed (if any):  - RA    Admission weight Weight - Scale: 64.9 kg (143 lb) (11/14/24 1328)    Today's weight    Wt Readings from Last 1 Encounters:   12/12/24 63 kg (138 lb 14.2 oz)        Scott need assessed each shift: N/A - no scott present  UOP >30ml/hr: YES  Last documented BM (in last 48 hrs):  Patient Vitals for the past 48 hrs:   Last BM (including prior to admit) Stool Occurrence   12/10/24 2000 12/07/24 --   12/11/24 0800 12/07/24 --   12/11/24 2035 12/07/24 --   12/11/24 2200 -- 0   12/12/24 0600 -- 0                Restraints (in use currently or dc'd in last 12 hrs): No    Order current and documentation up to date? N/A    Lines/Drains reviewed @ bedside.  Peripheral IV 12/10/24 Left Forearm (Active)   Number of days: 2       Peripheral IV 12/10/24 Left;Proximal Forearm (Active)   Number of days: 1         Drip rates at handoff:    ketamine (KETALAR) 500 mg in sodium chloride 0.9 % 100 mL infusion 0.075 mg/kg/hr (12/12/24 0601)    dextrose      dextrose 5% and 0.45% NaCl with KCl 30 mEq 100 mL/hr at 12/12/24 0601     sodium chloride         Lab Data:   CBC:   Recent Labs     12/10/24  0447 12/12/24  0353   WBC 8.3 6.9   HGB 11.5* 11.6*   HCT 34.7* 33.5*   MCV 92.6 91.4    415     BMP:    Recent Labs     12/10/24  0447 12/12/24  0353    138   K 3.5 4.4   CO2 17* 22   BUN 7 4*   CREATININE 0.4* 0.4*     LIVR: No results for input(s): \"AST\", \"ALT\" in the last 72 hours.  PT/INR: No results for input(s): \"INR\" in the last 72 hours.    Invalid input(s): \"PROT\"  APTT: No results for input(s): \"APTT\" in the last 72 hours.  ABG: No results for input(s): \"PHART\", \"QES6XUA\", \"PO2ART\" in the last 72 hours.    Any consults during the shift? No    Any signed and held orders to be released?  No        4 Eyes Skin Assessment       The patient is being assessed for  Shift Handoff    I agree that at least one RN has performed a thorough Head to Toe Skin Assessment on the patient. ALL assessment sites listed below have been assessed.      Areas assessed by both nurses: Head, Face, Ears, Shoulders, Back, Chest, Arms, Elbows, Hands, Sacrum. Buttock, Coccyx, Ischium, Legs. Feet and Heels, and Under Medical Devices         Does the Patient have a Wound? No noted wound(s) (just surgical incisions)    Wound Care Orders initiated by RN: No       Sonny Prevention initiated by RN: Yes    Pressure Injury (Stage 3,4, Unstageable, DTI, NWPT, and Complex wounds) if present, place Wound referral order by RN under : No    New Ostomies, if present place, Ostomy referral order under : No     Nurse 1 eSignature: Electronically signed by Alisha Thibodeaux RN on 12/12/24 at 7:40 AM EST    **SHARE this note so that the co-signing nurse can place an eSignature**    Nurse 2 eSignature: Electronically signed by Blaine Parra RN on 12/12/24 at 7:41 AM EST

## 2024-12-13 PROCEDURE — 2060000000 HC ICU INTERMEDIATE R&B

## 2024-12-13 PROCEDURE — 6360000002 HC RX W HCPCS: Performed by: SURGERY

## 2024-12-13 PROCEDURE — 6370000000 HC RX 637 (ALT 250 FOR IP): Performed by: PHYSICIAN ASSISTANT

## 2024-12-13 PROCEDURE — APPNB15 APP NON BILLABLE TIME 0-15 MINS: Performed by: PHYSICIAN ASSISTANT

## 2024-12-13 PROCEDURE — 94760 N-INVAS EAR/PLS OXIMETRY 1: CPT

## 2024-12-13 PROCEDURE — 2580000003 HC RX 258: Performed by: SURGERY

## 2024-12-13 PROCEDURE — APPSS15 APP SPLIT SHARED TIME 0-15 MINUTES: Performed by: PHYSICIAN ASSISTANT

## 2024-12-13 PROCEDURE — 6370000000 HC RX 637 (ALT 250 FOR IP): Performed by: SURGERY

## 2024-12-13 PROCEDURE — 2500000003 HC RX 250 WO HCPCS: Performed by: SURGERY

## 2024-12-13 PROCEDURE — 99024 POSTOP FOLLOW-UP VISIT: CPT | Performed by: PHYSICIAN ASSISTANT

## 2024-12-13 RX ORDER — IBUPROFEN 400 MG/1
400 TABLET, FILM COATED ORAL EVERY 6 HOURS PRN
Status: DISCONTINUED | OUTPATIENT
Start: 2024-12-13 | End: 2024-12-14 | Stop reason: HOSPADM

## 2024-12-13 RX ADMIN — BUPROPION HYDROCHLORIDE 300 MG: 150 TABLET, EXTENDED RELEASE ORAL at 09:10

## 2024-12-13 RX ADMIN — POTASSIUM CHLORIDE, DEXTROSE MONOHYDRATE AND SODIUM CHLORIDE: 224; 5; 450 INJECTION, SOLUTION INTRAVENOUS at 06:49

## 2024-12-13 RX ADMIN — ACETAMINOPHEN 1000 MG: 500 TABLET ORAL at 12:24

## 2024-12-13 RX ADMIN — SODIUM CHLORIDE, PRESERVATIVE FREE 10 ML: 5 INJECTION INTRAVENOUS at 21:20

## 2024-12-13 RX ADMIN — KETOROLAC TROMETHAMINE 15 MG: 15 INJECTION, SOLUTION INTRAMUSCULAR; INTRAVENOUS at 06:49

## 2024-12-13 RX ADMIN — IBUPROFEN 400 MG: 400 TABLET, FILM COATED ORAL at 22:07

## 2024-12-13 RX ADMIN — ACETAMINOPHEN 1000 MG: 500 TABLET ORAL at 18:14

## 2024-12-13 RX ADMIN — ENOXAPARIN SODIUM 40 MG: 100 INJECTION SUBCUTANEOUS at 09:10

## 2024-12-13 RX ADMIN — ACETAMINOPHEN 1000 MG: 500 TABLET ORAL at 08:09

## 2024-12-13 RX ADMIN — IBUPROFEN 400 MG: 400 TABLET, FILM COATED ORAL at 15:00

## 2024-12-13 RX ADMIN — ACETAMINOPHEN 1000 MG: 500 TABLET ORAL at 00:20

## 2024-12-13 RX ADMIN — LORAZEPAM 0.5 MG: 2 INJECTION INTRAMUSCULAR; INTRAVENOUS at 00:45

## 2024-12-13 ASSESSMENT — PAIN DESCRIPTION - ONSET
ONSET: ON-GOING

## 2024-12-13 ASSESSMENT — PAIN DESCRIPTION - LOCATION
LOCATION: ABDOMEN

## 2024-12-13 ASSESSMENT — PAIN SCALES - GENERAL
PAINLEVEL_OUTOF10: 0
PAINLEVEL_OUTOF10: 6
PAINLEVEL_OUTOF10: 8
PAINLEVEL_OUTOF10: 5

## 2024-12-13 ASSESSMENT — PAIN - FUNCTIONAL ASSESSMENT
PAIN_FUNCTIONAL_ASSESSMENT: ACTIVITIES ARE NOT PREVENTED

## 2024-12-13 ASSESSMENT — PAIN DESCRIPTION - ORIENTATION
ORIENTATION: MID

## 2024-12-13 ASSESSMENT — PAIN DESCRIPTION - PAIN TYPE
TYPE: SURGICAL PAIN

## 2024-12-13 ASSESSMENT — PAIN DESCRIPTION - FREQUENCY
FREQUENCY: CONTINUOUS

## 2024-12-13 ASSESSMENT — PAIN DESCRIPTION - DESCRIPTORS
DESCRIPTORS: DISCOMFORT
DESCRIPTORS: ACHING;SHARP
DESCRIPTORS: DISCOMFORT
DESCRIPTORS: DISCOMFORT

## 2024-12-13 NOTE — PROGRESS NOTES
Physical Therapy  Sabina Rios    Pt observed ambulating indep in the hallway with RW this PM with  managing IV pole.  Spoke with pt, she reports she is now up ad annie in the room and denies need for continued PT services.  Will sign-off at this time.    Electronically signed by Octavia Mathias, [place] [id number] on 12/13/2024 at 1:27 PM

## 2024-12-13 NOTE — PROGRESS NOTES
Occupational Therapy Attempt  Sabina Rios    Pt observed ambulating in hallway with spouse, using RW mod I. Spoke with pt re: continuing OT services in the hospital. Pt reports she has been able to toilet self independently, is able to reach feet to manage LB ADLs. Pt politely declines need for further OT services in the hospital. OT to sign off. Please re-consult if there is a change in pt status.    Electronically signed by Maren Joshua OT on 12/13/24 at 1:36 PM EST

## 2024-12-13 NOTE — PROGRESS NOTES
NAME:  Sabina Rios  YOB: 1965  MEDICAL RECORD NUMBER:  [id number]    Shift Summary: Was up in the chair until 1045 pm on my shift.  Ambulated twice around the unit before going to bed.  Bathed and changed. Able to eat and keep half a cup of noodle soup. Given PRN Ativan IV per pt request to for sleep and rest.  To the bathroom as needed.  Lobo light within reach.  Hand off completed.  CHONG Valladares to assume care.    Family updated: No    Rhythm: Normal Sinus Rhythm     Most recent vitals:   Visit Vitals  /76   Pulse 86   Temp 98.8 °F (37.1 °C) (Oral)   Resp 18   Ht 1.626 m (5' 4\")   Wt 63 kg (138 lb 14.2 oz)   SpO2 98%   BMI 23.84 kg/m²           No data found.    No data found.      Respiratory support needed (if any):  - RA    Admission weight Weight - Scale: 64.9 kg (143 lb) (11/14/24 1328)    Today's weight    Wt Readings from Last 1 Encounters:   12/12/24 63 kg (138 lb 14.2 oz)        Scott need assessed each shift: N/A - no scott present  UOP >30ml/hr: YES  Last documented BM (in last 48 hrs):  Patient Vitals for the past 48 hrs:   Last BM (including prior to admit) Stool Occurrence   12/11/24 0800 12/07/24 --   12/11/24 2035 12/07/24 --   12/11/24 2200 -- 0   12/12/24 0600 -- 0   12/12/24 0800 12/07/24 --   12/12/24 2110 12/07/24 --                Restraints (in use currently or dc'd in last 12 hrs): No    Order current and documentation up to date? No    Lines/Drains reviewed @ bedside.  Peripheral IV 12/12/24 Right Cephalic (Active)   Number of days: 0         Drip rates at handoff:    dextrose      dextrose 5% and 0.45% NaCl with KCl 30 mEq 100 mL/hr at 12/13/24 0652    sodium chloride         Lab Data:   CBC:   Recent Labs     12/12/24  0353   WBC 6.9   HGB 11.6*   HCT 33.5*   MCV 91.4        BMP:    Recent Labs     12/12/24  0353      K 4.4   CO2 22   BUN 4*   CREATININE 0.4*     LIVR: No results for input(s): \"AST\", \"ALT\" in the last 72 hours.  PT/INR: No results for

## 2024-12-13 NOTE — PLAN OF CARE
Problem: Discharge Planning  Goal: Discharge to home or other facility with appropriate resources  Outcome: Progressing  Flowsheets (Taken 12/13/2024 0809)  Discharge to home or other facility with appropriate resources:   Identify barriers to discharge with patient and caregiver   Arrange for needed discharge resources and transportation as appropriate     Problem: Pain  Goal: Verbalizes/displays adequate comfort level or baseline comfort level  Outcome: Progressing  Flowsheets (Taken 12/13/2024 0809)  Verbalizes/displays adequate comfort level or baseline comfort level:   Encourage patient to monitor pain and request assistance   Assess pain using appropriate pain scale   Administer analgesics based on type and severity of pain and evaluate response   Implement non-pharmacological measures as appropriate and evaluate response   Consider cultural and social influences on pain and pain management   Notify Licensed Independent Practitioner if interventions unsuccessful or patient reports new pain     Problem: ABCDS Injury Assessment  Goal: Absence of physical injury  Outcome: Progressing     Problem: Respiratory - Adult  Goal: Achieves optimal ventilation and oxygenation  Outcome: Progressing  Flowsheets (Taken 12/13/2024 0809)  Achieves optimal ventilation and oxygenation:   Assess for changes in respiratory status   Assess for changes in mentation and behavior   Position to facilitate oxygenation and minimize respiratory effort   Oxygen supplementation based on oxygen saturation or arterial blood gases   Assess the need for suctioning and aspirate as needed   Encourage broncho-pulmonary hygiene including cough, deep breathe, incentive spirometry   Assess and instruct to report shortness of breath or any respiratory difficulty   Respiratory therapy support as indicated     Problem: Skin/Tissue Integrity - Adult  Goal: Incisions, wounds, or drain sites healing without S/S of infection  Outcome:  Practitioner for values outside of normal range   Assess for signs of decreased cardiac output   Administer fluid and/or volume expanders as ordered     Problem: Metabolic/Fluid and Electrolytes - Adult  Goal: Electrolytes maintained within normal limits  Outcome: Progressing  Flowsheets (Taken 12/13/2024 0809)  Electrolytes maintained within normal limits:   Monitor labs and assess patient for signs and symptoms of electrolyte imbalances   Administer electrolyte replacement as ordered   Monitor response to electrolyte replacements, including repeat lab results as appropriate   Fluid restriction as ordered   Instruct patient on fluid and nutrition restrictions as appropriate

## 2024-12-13 NOTE — PROGRESS NOTES
drainage.   EXTREMITY: no cyanosis, clubbing or edema  I/O last 3 completed shifts:  In: 4028.5 [P.O.:390; I.V.:3638.5]  Out: 4975 [Urine:4975]  I/O this shift:  In: -   Out: 675 [Urine:675]    LABS  Recent Labs     12/12/24  0353   WBC 6.9   HGB 11.6*   HCT 33.5*         K 4.4      CO2 22   BUN 4*   CREATININE 0.4*   CALCIUM 7.8*   CBC:   Lab Results   Component Value Date/Time    WBC 6.9 12/12/2024 03:53 AM    RBC 3.67 12/12/2024 03:53 AM    HGB 11.6 12/12/2024 03:53 AM    HCT 33.5 12/12/2024 03:53 AM    MCV 91.4 12/12/2024 03:53 AM    MCH 31.6 12/12/2024 03:53 AM    MCHC 34.6 12/12/2024 03:53 AM    RDW 13.7 12/12/2024 03:53 AM     12/12/2024 03:53 AM    MPV 7.8 12/12/2024 03:53 AM     CMP:    Lab Results   Component Value Date/Time     12/12/2024 03:53 AM    K 4.4 12/12/2024 03:53 AM     12/12/2024 03:53 AM    CO2 22 12/12/2024 03:53 AM    BUN 4 12/12/2024 03:53 AM    CREATININE 0.4 12/12/2024 03:53 AM    GFRAA >60 05/13/2022 07:04 AM    GFRAA >60 01/18/2011 04:42 PM    AGRATIO 1.6 10/27/2024 07:50 AM    LABGLOM >90 12/12/2024 03:53 AM    LABGLOM 85 04/01/2024 06:47 AM    GLUCOSE 123 12/12/2024 03:53 AM    CALCIUM 7.8 12/12/2024 03:53 AM    BILITOT 0.9 10/27/2024 07:50 AM    ALKPHOS 82 10/27/2024 07:50 AM    AST 15 10/27/2024 07:50 AM    ALT 9 10/27/2024 07:50 AM         Claudio Estrella PA-C   Electronically signed 12/13/2024 at 10:45 AM    As above  Doing better today  Tolerating some PO but very limited  Passing flatus  Ambulating  Home soon    Electronically signed by DRAKE ERICKSON MD on 12/13/2024 at 3:56 PM

## 2024-12-14 VITALS
RESPIRATION RATE: 17 BRPM | BODY MASS INDEX: 23.71 KG/M2 | HEART RATE: 78 BPM | DIASTOLIC BLOOD PRESSURE: 66 MMHG | HEIGHT: 64 IN | OXYGEN SATURATION: 98 % | WEIGHT: 138.89 LBS | TEMPERATURE: 98 F | SYSTOLIC BLOOD PRESSURE: 124 MMHG

## 2024-12-14 PROCEDURE — 2580000003 HC RX 258: Performed by: SURGERY

## 2024-12-14 PROCEDURE — 6360000002 HC RX W HCPCS: Performed by: SURGERY

## 2024-12-14 PROCEDURE — 6370000000 HC RX 637 (ALT 250 FOR IP): Performed by: SURGERY

## 2024-12-14 PROCEDURE — 99024 POSTOP FOLLOW-UP VISIT: CPT | Performed by: STUDENT IN AN ORGANIZED HEALTH CARE EDUCATION/TRAINING PROGRAM

## 2024-12-14 PROCEDURE — 94760 N-INVAS EAR/PLS OXIMETRY 1: CPT

## 2024-12-14 RX ORDER — IBUPROFEN 200 MG
400 TABLET ORAL EVERY 8 HOURS
COMMUNITY
Start: 2024-12-14 | End: 2024-12-19

## 2024-12-14 RX ORDER — ACETAMINOPHEN 500 MG
1000 TABLET ORAL EVERY 8 HOURS
COMMUNITY
Start: 2024-12-14 | End: 2024-12-19

## 2024-12-14 RX ORDER — SENNA AND DOCUSATE SODIUM 50; 8.6 MG/1; MG/1
1 TABLET, FILM COATED ORAL 2 TIMES DAILY
COMMUNITY
Start: 2024-12-14 | End: 2024-12-28

## 2024-12-14 RX ADMIN — BUPROPION HYDROCHLORIDE 300 MG: 150 TABLET, EXTENDED RELEASE ORAL at 09:13

## 2024-12-14 RX ADMIN — ACETAMINOPHEN 1000 MG: 500 TABLET ORAL at 00:03

## 2024-12-14 RX ADMIN — LORAZEPAM 0.5 MG: 2 INJECTION INTRAMUSCULAR; INTRAVENOUS at 00:04

## 2024-12-14 RX ADMIN — SODIUM CHLORIDE, PRESERVATIVE FREE 10 ML: 5 INJECTION INTRAVENOUS at 08:19

## 2024-12-14 RX ADMIN — ENOXAPARIN SODIUM 40 MG: 100 INJECTION SUBCUTANEOUS at 09:12

## 2024-12-14 RX ADMIN — ACETAMINOPHEN 1000 MG: 500 TABLET ORAL at 08:14

## 2024-12-14 RX ADMIN — ACETAMINOPHEN 1000 MG: 500 TABLET ORAL at 12:02

## 2024-12-14 ASSESSMENT — PAIN DESCRIPTION - LOCATION
LOCATION: ABDOMEN

## 2024-12-14 ASSESSMENT — PAIN DESCRIPTION - ONSET
ONSET: ON-GOING

## 2024-12-14 ASSESSMENT — PAIN - FUNCTIONAL ASSESSMENT
PAIN_FUNCTIONAL_ASSESSMENT: ACTIVITIES ARE NOT PREVENTED

## 2024-12-14 ASSESSMENT — PAIN DESCRIPTION - PAIN TYPE
TYPE: SURGICAL PAIN

## 2024-12-14 ASSESSMENT — PAIN SCALES - GENERAL
PAINLEVEL_OUTOF10: 4
PAINLEVEL_OUTOF10: 6
PAINLEVEL_OUTOF10: 5
PAINLEVEL_OUTOF10: 6

## 2024-12-14 ASSESSMENT — PAIN DESCRIPTION - DESCRIPTORS
DESCRIPTORS: OTHER (COMMENT)
DESCRIPTORS: DISCOMFORT
DESCRIPTORS: OTHER (COMMENT)

## 2024-12-14 ASSESSMENT — PAIN DESCRIPTION - FREQUENCY
FREQUENCY: CONTINUOUS

## 2024-12-14 ASSESSMENT — PAIN DESCRIPTION - ORIENTATION
ORIENTATION: MID

## 2024-12-14 NOTE — PROGRESS NOTES
Reviewed AVS with patient and  Pat. Pt and  were both able to verbalize understanding of all discharge instructions. Copy was provided to patient.

## 2024-12-14 NOTE — PROGRESS NOTES
General and Vascular Surgery                                                           Daily Progress Note                                                             Dennis Lam MD    Pt Name: Sabina Rios  Medical Record Number: [id number]  Date of Birth 1965   Today's Date: 12/14/2024    ASSESSMENT/PLAN  S/P (12/8/24):Reopening of recent laparotomy with abdominal exploration for treatment of early small bowel obstruction following recent colectomy.   S/P (12/4/24): Laparoscopic converted to open resection of descending and sigmoid colon with coloproctostomy.   Multimodal pain control, avoiding narcotics  Trending labs  Denies any nausea or emesis  +flatulence, no significant bowel movement  Tolerating more regular diet.  OOB as tolerated.   Home today and will continue home Amitiza and likely senna/docusate  EDUCATION  Patient educated about their illness/diagnosis, stated above, and all questions answered. We discussed the importance of nutrition, medications they are taking, and healthy lifestyle.  SUBJECTIVE  Sabina has improved from yesterday. Pain is controlled. She has no nausea and no vomiting .  She has passed flatus and has not had a bowel movement. She is tolerating regular diet. Current activity is ad annie    OBJECTIVE  VITALS:  height is 1.626 m (5' 4\") and weight is 63 kg (138 lb 14.2 oz). Her oral temperature is 98 °F (36.7 °C). Her blood pressure is 124/66 and her pulse is 74. Her respiration is 17 and oxygen saturation is 98%. VITALS:  /66   Pulse 74   Temp 98 °F (36.7 °C) (Oral)   Resp 17   Ht 1.626 m (5' 4\")   Wt 63 kg (138 lb 14.2 oz)   LMP 03/07/2017   SpO2 98%   BMI 23.84 kg/m²   GENERAL: alert, no distress  LUNGS: clear to ausculation, without wheezes, rales or rhonci  HEART: normal rate and regular rhythm  ABDOMEN: soft, incisional tenderness, non-distended. Incision site healing well. No significant

## 2024-12-14 NOTE — PROGRESS NOTES
Pt left unit via wheelchair with all belongings.  Pat present. RN wheeled patient via wheelchair to discharge bridge and assisted patient into car.

## 2024-12-14 NOTE — PLAN OF CARE
Problem: Discharge Planning  Goal: Discharge to home or other facility with appropriate resources  Outcome: Completed     Problem: Pain  Goal: Verbalizes/displays adequate comfort level or baseline comfort level  Outcome: Completed     Problem: Safety - Adult  Goal: Free from fall injury  Outcome: Completed     Problem: Skin/Tissue Integrity  Goal: Absence of new skin breakdown  Description: 1.  Monitor for areas of redness and/or skin breakdown  2.  Assess vascular access sites hourly  3.  Every 4-6 hours minimum:  Change oxygen saturation probe site  4.  Every 4-6 hours:  If on nasal continuous positive airway pressure, respiratory therapy assess nares and determine need for appliance change or resting period.  Outcome: Completed     Problem: ABCDS Injury Assessment  Goal: Absence of physical injury  Outcome: Completed     Problem: Respiratory - Adult  Goal: Achieves optimal ventilation and oxygenation  Outcome: Completed     Problem: Skin/Tissue Integrity - Adult  Goal: Incisions, wounds, or drain sites healing without S/S of infection  Outcome: Completed     Problem: Gastrointestinal - Adult  Goal: Minimal or absence of nausea and vomiting  Outcome: Completed     Problem: Gastrointestinal - Adult  Goal: Maintains or returns to baseline bowel function  Outcome: Completed     Problem: Gastrointestinal - Adult  Goal: Maintains adequate nutritional intake  Outcome: Completed     Problem: Genitourinary - Adult  Goal: Absence of urinary retention  Outcome: Completed     Problem: Infection - Adult  Goal: Absence of infection during hospitalization  Outcome: Completed     Problem: Cardiovascular - Adult  Goal: Maintains optimal cardiac output and hemodynamic stability  Outcome: Completed     Problem: Metabolic/Fluid and Electrolytes - Adult  Goal: Electrolytes maintained within normal limits  Outcome: Completed     Problem: Nutrition Deficit:  Goal: Optimize nutritional status  Outcome: Completed

## 2024-12-14 NOTE — PROGRESS NOTES
Dr Nieves at bedside. Pt will be discharged today per MD. PIV removed. Tele removed. SR noted prior to removing tele.

## 2024-12-14 NOTE — PROGRESS NOTES
NAME:  Sabina Rios  YOB: 1965  MEDICAL RECORD NUMBER:  [id number]    Shift Summary: Uneventful night.  Continues to improve re: activity and intake.  Independent. Call light within reach.     Family updated: No    Rhythm: Normal Sinus Rhythm     Most recent vitals:   Visit Vitals  /66   Pulse 80   Temp 98.4 °F (36.9 °C) (Oral)   Resp 16   Ht 1.626 m (5' 4\")   Wt 63 kg (138 lb 14.2 oz)   SpO2 98%   BMI 23.84 kg/m²           No data found.    No data found.      Respiratory support needed (if any):  - RA    Admission weight Weight - Scale: 64.9 kg (143 lb) (11/14/24 1328)    Today's weight    Wt Readings from Last 1 Encounters:   12/12/24 63 kg (138 lb 14.2 oz)        Scott need assessed each shift: N/A - no scott present  UOP >30ml/hr: YES  Last documented BM (in last 48 hrs):  Patient Vitals for the past 48 hrs:   Last BM (including prior to admit) Stool Occurrence   12/12/24 0600 -- 0   12/12/24 0800 12/07/24 --   12/12/24 2110 12/07/24 --   12/13/24 0809 12/07/24 --   12/13/24 1920 12/07/24 --                Restraints (in use currently or dc'd in last 12 hrs): No    Order current and documentation up to date? No    Lines/Drains reviewed @ bedside.  Peripheral IV 12/12/24 Right Cephalic (Active)   Number of days: 1         Drip rates at handoff:    dextrose      sodium chloride         Lab Data:   CBC:   Recent Labs     12/12/24  0353   WBC 6.9   HGB 11.6*   HCT 33.5*   MCV 91.4        BMP:    Recent Labs     12/12/24  0353      K 4.4   CO2 22   BUN 4*   CREATININE 0.4*     LIVR: No results for input(s): \"AST\", \"ALT\" in the last 72 hours.  PT/INR: No results for input(s): \"INR\" in the last 72 hours.    Invalid input(s): \"PROT\"  APTT: No results for input(s): \"APTT\" in the last 72 hours.  ABG: No results for input(s): \"PHART\", \"PVD5QNB\", \"PO2ART\" in the last 72 hours.    Any consults during the shift? No    Any signed and held orders to be released?  No        4 Eyes Skin

## 2024-12-14 NOTE — DISCHARGE INSTRUCTIONS
POST-OPERATIVE INSTRUCTIONS   GENERAL SURGERY      DRESSING & INCISION CARE:    ** WASH HANDS IMMEDIATELY BEFORE TOUCHING YOUR DRESSING OR INCISION **    Your incision is closed with:  Staples:  These must be removed when determined by your surgeon.  Please return to the office for a follow-up visit when instructed for staple removal.    Long-term care of Incision:   A moisturizer (Eucerin, Vit E ointment, etc) can be used on the incision starting 2 weeks after surgery, if it is dry or itchy.    Mederma can occasionally help reduce scarring.    Sun exposure can worsen scarring.  Cover your incision and/or apply sunscreen when exposed to the sun.    SHOWERING / BATHING:  You may shower the day after your surgery.    Wash the area around your incision gently, and then pat dry.  Do not scrub / rub aggressively to avoid disrupting the skin closure (above).  Remember, your coordination will be off for the first couple of days after surgery.  A slippery surface can present a dangerous challenge.   Avoid soaking under water (in a bath tub, hot tub, or pool) until instructed by your surgeon.      ACTIVITY:  Avoid strenuous activity or heavy lifting (greater than 20 lbs.) until discussing with your surgeon at your first post-operative visit.  This restriction will likely continue for 4 to 6 weeks after surgery, depending on your procedure.   You may walk around and go up and down stairs in moderation.    Do what is comfortable.  Stop and rest when you feel tired.    IF IT HURTS, DON'T DO IT !!    PAIN & PAIN MEDICATION:  SURGERY HURTS, some pain is normal !!  If you had laparoscopic or robotic surgery (small incisions using a camera and thin instruments), carbon dioxide (CO2) gas is pumped into your belly to create space to work.    You may experience abdominal, rib, or shoulder (especially on the right) pain for a few days due irritation from this process.      Place an ice pack over your incisions (atop the dressing,

## 2024-12-16 ENCOUNTER — TELEPHONE (OUTPATIENT)
Dept: VASCULAR SURGERY | Age: 59
End: 2024-12-16

## 2024-12-16 ENCOUNTER — CARE COORDINATION (OUTPATIENT)
Dept: OTHER | Facility: CLINIC | Age: 59
End: 2024-12-16

## 2024-12-16 NOTE — TELEPHONE ENCOUNTER
PT states that she needs to come in to have her staples removed on 12/23.  She needs to have an appt 2:00 or later that day.  Also, PT says she has not had a bowel movement yet and would like to have a return call regarding these concerns.

## 2024-12-16 NOTE — CARE COORDINATION
Transitions program on 12/16/2024.    Handoff:   Patient was not referred to the ACM team due to patient declined services.      Patient has agreed to contact primary care provider and/or specialist for any further questions, concerns, or needs.    Valeria Carlisle RN

## 2024-12-19 ENCOUNTER — OFFICE VISIT (OUTPATIENT)
Dept: SURGERY | Age: 59
End: 2024-12-19

## 2024-12-19 VITALS — BODY MASS INDEX: 23.56 KG/M2 | WEIGHT: 138 LBS | HEIGHT: 64 IN

## 2024-12-19 DIAGNOSIS — K57.20 DIVERTICULITIS OF LARGE INTESTINE WITH PERFORATION WITHOUT ABSCESS OR BLEEDING: Primary | ICD-10-CM

## 2024-12-19 NOTE — PROGRESS NOTES
Sabina Rios (:  1965) is a 59 y.o. female,Established patient, here for evaluation of the following chief complaint(s):  Post-Op Check (staples)         Assessment & Plan  Diverticulitis of large intestine with perforation without abscess or bleeding            Follow up in two weeks       Subjective   HPI  Patient presents s/p colectomy. Patient is two weeks post op. Pain level is minor. Incision appearance: well healed, staples out today. Post op complications: early SBO with repeat laparotomy and lysis of adhesions. Pathology report reviewed with patient and showed diverticulitis. Follow up in two weeks.  Review of Systems       Objective   Physical Exam       Electronically signed by DRAKE ERICKSON MD on 2024 at 9:02 AM        An electronic signature was used to authenticate this note.    --DRAKE ERICKSON MD   
Patient with recent bariatric surgery, coordinated care with bariatric surgery team and urology team.

## 2025-01-02 ENCOUNTER — OFFICE VISIT (OUTPATIENT)
Dept: SURGERY | Age: 60
End: 2025-01-02

## 2025-01-02 VITALS — SYSTOLIC BLOOD PRESSURE: 121 MMHG | HEART RATE: 85 BPM | DIASTOLIC BLOOD PRESSURE: 56 MMHG

## 2025-01-02 DIAGNOSIS — K57.20 DIVERTICULITIS OF LARGE INTESTINE WITH PERFORATION WITHOUT ABSCESS OR BLEEDING: Primary | ICD-10-CM

## 2025-01-02 PROCEDURE — 99024 POSTOP FOLLOW-UP VISIT: CPT | Performed by: SURGERY

## 2025-01-02 NOTE — PROGRESS NOTES
Sabina Rios (:  1965) is a 59 y.o. female,Established patient, here for evaluation of the following chief complaint(s):  Post-Op Check (S/P Reopening of recent laparotomy with abdominal exploration 24. Doing well, getting better. Still sore and sleepy. Miralax every other day for bowels )         Assessment & Plan  Diverticulitis of large intestine with perforation without abscess or bleeding            Follow up in two weeks       Subjective   HPI  Patient presents s/p sigmoid colectomy. Patient is four weeks post op. Pain level is minor with some worsening after eating. Incision appearance: well healed. Post op complications: early SBO with repeat laparotomy, lysis of adhesions. Pathology report reviewed with patient and showed diverticulitis. Follow up in two weeks.    Review of Systems       Objective   Physical Exam       Electronically signed by DRAKE ERICKSON MD on 2025 at 9:37 AM        An electronic signature was used to authenticate this note.    --DRAKE ERICKSON MD

## 2025-01-07 ENCOUNTER — E-VISIT (OUTPATIENT)
Dept: FAMILY MEDICINE CLINIC | Age: 60
End: 2025-01-07
Payer: COMMERCIAL

## 2025-01-07 ENCOUNTER — PATIENT MESSAGE (OUTPATIENT)
Dept: FAMILY MEDICINE CLINIC | Age: 60
End: 2025-01-07

## 2025-01-07 DIAGNOSIS — U07.1 COVID: Primary | ICD-10-CM

## 2025-01-07 PROCEDURE — 99421 OL DIG E/M SVC 5-10 MIN: CPT | Performed by: INTERNAL MEDICINE

## 2025-01-07 ASSESSMENT — LIFESTYLE VARIABLES
SMOKING_STATUS: NO, BUT I USED TO SMOKE
PACKS_PER_DAY: 1
SMOKING_YEARS: 15

## 2025-01-16 ENCOUNTER — OFFICE VISIT (OUTPATIENT)
Dept: SURGERY | Age: 60
End: 2025-01-16

## 2025-01-16 VITALS — HEIGHT: 64 IN | WEIGHT: 138 LBS | BODY MASS INDEX: 23.56 KG/M2

## 2025-01-16 DIAGNOSIS — K57.20 DIVERTICULITIS OF LARGE INTESTINE WITH PERFORATION WITHOUT ABSCESS OR BLEEDING: Primary | ICD-10-CM

## 2025-01-16 PROCEDURE — 99024 POSTOP FOLLOW-UP VISIT: CPT | Performed by: SURGERY

## 2025-01-16 NOTE — PROGRESS NOTES
Sabina Rios (:  1965) is a 59 y.o. female,Established patient, here for evaluation of the following chief complaint(s):  Post-Op Check (C/o tiredness )         Assessment & Plan  Diverticulitis of large intestine with perforation without abscess or bleeding            Follow up in two weeks       Subjective   HPI  Was doing better post op but developed covid. Significant coughing and diarrhea but both are improving. Still lacking energy but seems to be improving. Incision well healed. Follow up in two weeks    Review of Systems       Objective   Physical Exam       Electronically signed by DRAKE ERICKSON MD on 2025 at 9:21 AM        An electronic signature was used to authenticate this note.    --DRAKE ERICKSON MD

## 2025-01-27 ENCOUNTER — HOSPITAL ENCOUNTER (OUTPATIENT)
Dept: WOMENS IMAGING | Age: 60
Discharge: HOME | End: 2025-01-27
Payer: COMMERCIAL

## 2025-01-27 VITALS — WEIGHT: 138 LBS | BODY MASS INDEX: 23.56 KG/M2 | HEIGHT: 64 IN

## 2025-01-27 DIAGNOSIS — Z12.31 BREAST CANCER SCREENING BY MAMMOGRAM: ICD-10-CM

## 2025-01-27 PROCEDURE — 77063 BREAST TOMOSYNTHESIS BI: CPT

## 2025-01-30 ENCOUNTER — OFFICE VISIT (OUTPATIENT)
Dept: SURGERY | Age: 60
End: 2025-01-30

## 2025-01-30 VITALS — WEIGHT: 138 LBS | HEIGHT: 64 IN | BODY MASS INDEX: 23.56 KG/M2

## 2025-01-30 DIAGNOSIS — K57.20 DIVERTICULITIS OF LARGE INTESTINE WITH PERFORATION WITHOUT ABSCESS OR BLEEDING: Primary | ICD-10-CM

## 2025-01-30 PROCEDURE — 99024 POSTOP FOLLOW-UP VISIT: CPT | Performed by: SURGERY

## 2025-01-30 NOTE — PROGRESS NOTES
Sabina Rios (:  1965) is a 59 y.o. female,Established patient, here for evaluation of the following chief complaint(s):  Post-Op Check (Site check)         Assessment & Plan  Diverticulitis of large intestine with perforation without abscess or bleeding            Follow up with me as needed         Subjective   HPI  Doing better. Still with some pain at times but improving. Lacking energy but also improving. Return to work next week. Follow up with me as needed    Review of Systems       Objective   Physical Exam       Electronically signed by DRAKE ERICKSON MD on 2025 at 10:16 AM        An electronic signature was used to authenticate this note.    --DRAKE ERICKSON MD

## 2025-04-21 DIAGNOSIS — M85.80 OSTEOPENIA AFTER MENOPAUSE: Primary | ICD-10-CM

## 2025-04-21 DIAGNOSIS — Z78.0 OSTEOPENIA AFTER MENOPAUSE: Primary | ICD-10-CM

## 2025-04-21 RX ORDER — DENOSUMAB 60 MG/ML
INJECTION SUBCUTANEOUS
Qty: 1 ML | Refills: 0 | Status: SHIPPED | OUTPATIENT
Start: 2025-04-21

## 2025-04-23 ENCOUNTER — RESULTS FOLLOW-UP (OUTPATIENT)
Dept: FAMILY MEDICINE CLINIC | Age: 60
End: 2025-04-23

## 2025-04-23 DIAGNOSIS — Z78.0 OSTEOPENIA AFTER MENOPAUSE: ICD-10-CM

## 2025-04-23 DIAGNOSIS — M85.80 OSTEOPENIA AFTER MENOPAUSE: ICD-10-CM

## 2025-04-23 LAB
ANION GAP SERPL CALCULATED.3IONS-SCNC: 9 MMOL/L (ref 3–16)
BUN SERPL-MCNC: 12 MG/DL (ref 7–20)
CALCIUM SERPL-MCNC: 9.6 MG/DL (ref 8.3–10.6)
CHLORIDE SERPL-SCNC: 101 MMOL/L (ref 99–110)
CO2 SERPL-SCNC: 28 MMOL/L (ref 21–32)
CREAT SERPL-MCNC: 0.7 MG/DL (ref 0.6–1.1)
GFR SERPLBLD CREATININE-BSD FMLA CKD-EPI: >90 ML/MIN/{1.73_M2}
GLUCOSE SERPL-MCNC: 87 MG/DL (ref 70–99)
POTASSIUM SERPL-SCNC: 4.8 MMOL/L (ref 3.5–5.1)
SODIUM SERPL-SCNC: 138 MMOL/L (ref 136–145)

## 2025-05-02 ENCOUNTER — OFFICE VISIT (OUTPATIENT)
Dept: FAMILY MEDICINE CLINIC | Age: 60
End: 2025-05-02
Payer: COMMERCIAL

## 2025-05-02 VITALS
HEIGHT: 64 IN | OXYGEN SATURATION: 95 % | DIASTOLIC BLOOD PRESSURE: 84 MMHG | TEMPERATURE: 97.9 F | HEART RATE: 84 BPM | WEIGHT: 136 LBS | BODY MASS INDEX: 23.22 KG/M2 | SYSTOLIC BLOOD PRESSURE: 116 MMHG

## 2025-05-02 DIAGNOSIS — K58.1 IRRITABLE BOWEL SYNDROME WITH CONSTIPATION: ICD-10-CM

## 2025-05-02 DIAGNOSIS — M81.0 SENILE OSTEOPOROSIS: ICD-10-CM

## 2025-05-02 DIAGNOSIS — Z00.00 PHYSICAL EXAM: Primary | ICD-10-CM

## 2025-05-02 PROBLEM — K56.609 SBO (SMALL BOWEL OBSTRUCTION) (HCC): Status: RESOLVED | Noted: 2024-12-08 | Resolved: 2025-05-02

## 2025-05-02 PROBLEM — K57.92 ACUTE DIVERTICULITIS: Status: RESOLVED | Noted: 2024-10-27 | Resolved: 2025-05-02

## 2025-05-02 PROBLEM — R42 DIZZINESS: Status: RESOLVED | Noted: 2024-02-14 | Resolved: 2025-05-02

## 2025-05-02 PROCEDURE — 99213 OFFICE O/P EST LOW 20 MIN: CPT

## 2025-05-02 PROCEDURE — 99396 PREV VISIT EST AGE 40-64: CPT

## 2025-05-02 ASSESSMENT — ENCOUNTER SYMPTOMS
COUGH: 0
BLOOD IN STOOL: 0
TROUBLE SWALLOWING: 0
COLOR CHANGE: 0
WHEEZING: 0
SINUS PRESSURE: 0
DIARRHEA: 1
NAUSEA: 0
CONSTIPATION: 0
SHORTNESS OF BREATH: 0
BACK PAIN: 0
SORE THROAT: 0
ABDOMINAL PAIN: 0
APNEA: 0
VOMITING: 0

## 2025-05-02 ASSESSMENT — PATIENT HEALTH QUESTIONNAIRE - PHQ9
SUM OF ALL RESPONSES TO PHQ QUESTIONS 1-9: 0
3. TROUBLE FALLING OR STAYING ASLEEP: NOT AT ALL
4. FEELING TIRED OR HAVING LITTLE ENERGY: NOT AT ALL
2. FEELING DOWN, DEPRESSED OR HOPELESS: NOT AT ALL
6. FEELING BAD ABOUT YOURSELF - OR THAT YOU ARE A FAILURE OR HAVE LET YOURSELF OR YOUR FAMILY DOWN: NOT AT ALL
9. THOUGHTS THAT YOU WOULD BE BETTER OFF DEAD, OR OF HURTING YOURSELF: NOT AT ALL
10. IF YOU CHECKED OFF ANY PROBLEMS, HOW DIFFICULT HAVE THESE PROBLEMS MADE IT FOR YOU TO DO YOUR WORK, TAKE CARE OF THINGS AT HOME, OR GET ALONG WITH OTHER PEOPLE: NOT DIFFICULT AT ALL
7. TROUBLE CONCENTRATING ON THINGS, SUCH AS READING THE NEWSPAPER OR WATCHING TELEVISION: NOT AT ALL
8. MOVING OR SPEAKING SO SLOWLY THAT OTHER PEOPLE COULD HAVE NOTICED. OR THE OPPOSITE, BEING SO FIGETY OR RESTLESS THAT YOU HAVE BEEN MOVING AROUND A LOT MORE THAN USUAL: NOT AT ALL
SUM OF ALL RESPONSES TO PHQ QUESTIONS 1-9: 0
SUM OF ALL RESPONSES TO PHQ QUESTIONS 1-9: 0
1. LITTLE INTEREST OR PLEASURE IN DOING THINGS: NOT AT ALL
5. POOR APPETITE OR OVEREATING: NOT AT ALL
SUM OF ALL RESPONSES TO PHQ QUESTIONS 1-9: 0

## 2025-05-02 NOTE — PATIENT INSTRUCTIONS
Thank you for choosing [facility].    Please bring a current list of medications to every appointment.    Please contact your pharmacy for any prescription refill(s) that you are requesting.

## 2025-05-02 NOTE — ASSESSMENT & PLAN NOTE
Digna presents today for her well exam.  Reviewed recent medical history, vitals, labs, medications.  Overall she has been doing better since recent colon surgery.  Continue to monitor your diet and stay well-hydrated  Up-to-date mammogram and colonoscopy.  Plan for Pap smear  Follow-up 1 year

## 2025-05-06 ENCOUNTER — TELEPHONE (OUTPATIENT)
Dept: FAMILY MEDICINE CLINIC | Age: 60
End: 2025-05-06

## 2025-05-06 RX ORDER — ONDANSETRON 4 MG/1
4 TABLET, FILM COATED ORAL EVERY 8 HOURS PRN
Qty: 20 TABLET | Refills: 0 | Status: SHIPPED | OUTPATIENT
Start: 2025-05-06

## 2025-05-15 ENCOUNTER — PATIENT MESSAGE (OUTPATIENT)
Dept: FAMILY MEDICINE CLINIC | Age: 60
End: 2025-05-15

## 2025-05-15 RX ORDER — LUBIPROSTONE 24 UG/1
24 CAPSULE ORAL 2 TIMES DAILY WITH MEALS
Qty: 60 CAPSULE | Refills: 3 | Status: SHIPPED | OUTPATIENT
Start: 2025-05-15

## 2025-06-01 PROBLEM — Z00.00 PHYSICAL EXAM: Status: RESOLVED | Noted: 2025-05-02 | Resolved: 2025-06-01

## 2025-06-23 LAB
CHOLEST SERPL-MCNC: 261 MG/DL (ref 0–199)
GLUCOSE SERPL-MCNC: 83 MG/DL (ref 70–99)
HDLC SERPL-MCNC: 97 MG/DL (ref 40–60)
LDLC SERPL CALC-MCNC: 146 MG/DL
TRIGL SERPL-MCNC: 90 MG/DL (ref 0–150)

## 2025-07-31 DIAGNOSIS — E53.8 VITAMIN B12 DEFICIENCY: ICD-10-CM

## 2025-07-31 DIAGNOSIS — F34.1 DYSTHYMIA: ICD-10-CM

## 2025-07-31 RX ORDER — ONDANSETRON 4 MG/1
4 TABLET, FILM COATED ORAL EVERY 8 HOURS PRN
Qty: 20 TABLET | Refills: 0 | Status: SHIPPED | OUTPATIENT
Start: 2025-07-31

## 2025-07-31 RX ORDER — CYANOCOBALAMIN, ISOPROPYL ALCOHOL 1000MCG/ML
1000 KIT INJECTION
Qty: 12 KIT | Refills: 0 | Status: SHIPPED | OUTPATIENT
Start: 2025-07-31

## 2025-07-31 RX ORDER — LUBIPROSTONE 24 UG/1
24 CAPSULE ORAL 2 TIMES DAILY WITH MEALS
Qty: 60 CAPSULE | Refills: 3 | Status: SHIPPED | OUTPATIENT
Start: 2025-07-31

## 2025-07-31 RX ORDER — BUPROPION HYDROCHLORIDE 300 MG/1
300 TABLET ORAL EVERY MORNING
Qty: 90 TABLET | Refills: 2 | Status: SHIPPED | OUTPATIENT
Start: 2025-07-31

## 2025-08-01 RX ORDER — SYRINGE WITH NEEDLE, 1 ML 25GX5/8"
SYRINGE, EMPTY DISPOSABLE MISCELLANEOUS
Qty: 12 EACH | Refills: 2 | Status: SHIPPED | OUTPATIENT
Start: 2025-08-01

## 2025-08-08 ENCOUNTER — OFFICE VISIT (OUTPATIENT)
Dept: ORTHOPEDIC SURGERY | Age: 60
End: 2025-08-08
Payer: COMMERCIAL

## 2025-08-08 VITALS — WEIGHT: 130 LBS | BODY MASS INDEX: 22.2 KG/M2 | HEIGHT: 64 IN

## 2025-08-08 DIAGNOSIS — M79.671 PAIN IN BOTH FEET: ICD-10-CM

## 2025-08-08 DIAGNOSIS — Q66.71 PES CAVUS OF BOTH FEET: Primary | ICD-10-CM

## 2025-08-08 DIAGNOSIS — M79.672 PAIN IN BOTH FEET: ICD-10-CM
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
## 2025-08-08 DIAGNOSIS — Q66.72 PES CAVUS OF BOTH FEET: Primary | ICD-10-CM

## 2025-08-08 PROCEDURE — 99214 OFFICE O/P EST MOD 30 MIN: CPT | Performed by: ORTHOPAEDIC SURGERY

## 2025-08-08 RX ORDER — NAPROXEN 500 MG/1
500 TABLET ORAL 2 TIMES DAILY WITH MEALS
Qty: 60 TABLET | Refills: 0 | Status: SHIPPED | OUTPATIENT
Start: 2025-08-08 | End: 2025-09-07

## 2025-09-03 ENCOUNTER — OFFICE VISIT (OUTPATIENT)
Dept: ORTHOPEDIC SURGERY | Age: 60
End: 2025-09-03

## 2025-09-03 VITALS — HEIGHT: 64 IN | WEIGHT: 130 LBS | BODY MASS INDEX: 22.2 KG/M2
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
## (undated) DEVICE — TROCARS: Brand: KII® BALLOON BLUNT TIP SYSTEM

## (undated) DEVICE — NEPTUNE E-SEP SMOKE EVACUATION PENCIL, COATED, 70MM BLADE, PUSH BUTTON SWITCH: Brand: NEPTUNE E-SEP

## (undated) DEVICE — TRANSFER SET 3": Brand: MEDLINE INDUSTRIES, INC.

## (undated) DEVICE — SYRINGE MED 10ML LUERLOCK TIP W/O SFTY DISP

## (undated) DEVICE — SHEET, T, LAPAROTOMY, STERILE: Brand: MEDLINE

## (undated) DEVICE — SPONGE GZ W4XL4IN COT 12 PLY TYP VII WVN C FLD DSGN STERILE

## (undated) DEVICE — SET INSUF TUBE HEAT ISO CONN DISP

## (undated) DEVICE — SUTURE ABSORBABLE MONOFILAMENT 0 CTX 60 IN VIO PDS + PDP990G

## (undated) DEVICE — GENERAL LAPAROSCOPY: Brand: MEDLINE INDUSTRIES, INC.

## (undated) DEVICE — GOWN SIRUS NONREIN XL W/TWL: Brand: MEDLINE INDUSTRIES, INC.

## (undated) DEVICE — RELOAD STPL L75MM OPN H3.8MM CLS 1.5MM WIRE DIA0.2MM REG

## (undated) DEVICE — NEEDLE HYPO 23GA L1.5IN TURQ POLYPR HUB S STL THN WALL IM

## (undated) DEVICE — COVER LT HNDL BLU PLAS

## (undated) DEVICE — HYPODERMIC SAFETY NEEDLE: Brand: MAGELLAN

## (undated) DEVICE — 1LYRTR 16FR10ML100%SIL UMS SNP: Brand: MEDLINE INDUSTRIES, INC.

## (undated) DEVICE — DILATOR ENDOSCP L180CM DIA6FR BLLN L8CM DIA54-60FR

## (undated) DEVICE — STAPLER INT L75MM CUT LN L73MM STPL LN L77MM BLU B FRM 8

## (undated) DEVICE — PREMIUM DRY TRAY LF: Brand: MEDLINE INDUSTRIES, INC.

## (undated) DEVICE — GLOVE ORANGE PI 7   MSG9070

## (undated) DEVICE — DRAPE,REIN 53X77,STERILE: Brand: MEDLINE

## (undated) DEVICE — STAPLER INT CUT LN 51MM STPL 51MM BLU CRV HD B FRM

## (undated) DEVICE — TROCAR: Brand: KII SLEEVE

## (undated) DEVICE — MAJOR SET UP: Brand: MEDLINE INDUSTRIES, INC.

## (undated) DEVICE — STAPLER INT L28CM DIA29MM CLS STPL H10-2.5MM OPN LEG L5.5MM

## (undated) DEVICE — PUMP SUC IRR TBNG L10FT W/ HNDPC ASSEMB STRYKEFLOW 2

## (undated) DEVICE — ELECTRODE PT RET AD L9FT HI MOIST COND ADH HYDRGEL CORDED

## (undated) DEVICE — SPONGE LAP W18XL18IN WHT COT 4 PLY FLD STRUNG RADPQ DISP ST 2 PER PACK

## (undated) DEVICE — FORCEPS BX 240CM 2.4MM L NDL RAD JAW 4 M00513334

## (undated) DEVICE — CLEANER,CAUTERY TIP,2X2",STERILE: Brand: MEDLINE

## (undated) DEVICE — SYRINGE INFL 60ML DISP ALLIANCE II

## (undated) DEVICE — LEGGINGS, PAIR, CLEAR, STERILE: Brand: MEDLINE

## (undated) DEVICE — PENCIL SMOKE EVAC PUSH BUTTON COATED

## (undated) DEVICE — STAPLER SKIN H3.9MM WIRE DIA0.58MM CRWN 6.9MM 35 STPL ROT

## (undated) DEVICE — MERCY HEALTH WEST TURNOVER: Brand: MEDLINE INDUSTRIES, INC.

## (undated) DEVICE — MASTISOL ADHESIVE LIQ 2/3ML